# Patient Record
Sex: MALE | Race: WHITE | NOT HISPANIC OR LATINO | URBAN - METROPOLITAN AREA
[De-identification: names, ages, dates, MRNs, and addresses within clinical notes are randomized per-mention and may not be internally consistent; named-entity substitution may affect disease eponyms.]

---

## 2018-11-14 ENCOUNTER — INPATIENT (INPATIENT)
Facility: HOSPITAL | Age: 58
LOS: 8 days | Discharge: HOME | DRG: 215 | End: 2018-11-23
Attending: STUDENT IN AN ORGANIZED HEALTH CARE EDUCATION/TRAINING PROGRAM | Admitting: INTERNAL MEDICINE
Payer: COMMERCIAL

## 2018-11-14 VITALS
RESPIRATION RATE: 17 BRPM | SYSTOLIC BLOOD PRESSURE: 117 MMHG | HEART RATE: 97 BPM | OXYGEN SATURATION: 100 % | DIASTOLIC BLOOD PRESSURE: 87 MMHG

## 2018-11-14 DIAGNOSIS — R07.89 OTHER CHEST PAIN: ICD-10-CM

## 2018-11-14 LAB
ALBUMIN SERPL ELPH-MCNC: 4.8 G/DL — SIGNIFICANT CHANGE UP (ref 3.3–5)
ALP SERPL-CCNC: 65 U/L — SIGNIFICANT CHANGE UP (ref 40–120)
ALT FLD-CCNC: 64 U/L — HIGH (ref 10–45)
ANION GAP SERPL CALC-SCNC: 17 MMOL/L — SIGNIFICANT CHANGE UP (ref 5–17)
ANION GAP SERPL CALC-SCNC: 19 MMOL/L — HIGH (ref 5–17)
APTT BLD: 26.1 SEC — LOW (ref 27.5–36.3)
APTT BLD: 35.9 SEC — SIGNIFICANT CHANGE UP (ref 27.5–36.3)
AST SERPL-CCNC: 71 U/L — HIGH (ref 10–40)
BASOPHILS # BLD AUTO: 0 K/UL — SIGNIFICANT CHANGE UP (ref 0–0.2)
BASOPHILS NFR BLD AUTO: 0 % — SIGNIFICANT CHANGE UP (ref 0–2)
BILIRUB SERPL-MCNC: 0.8 MG/DL — SIGNIFICANT CHANGE UP (ref 0.2–1.2)
BUN SERPL-MCNC: 21 MG/DL — SIGNIFICANT CHANGE UP (ref 7–23)
BUN SERPL-MCNC: 22 MG/DL — SIGNIFICANT CHANGE UP (ref 7–23)
CALCIUM SERPL-MCNC: 9.4 MG/DL — SIGNIFICANT CHANGE UP (ref 8.4–10.5)
CALCIUM SERPL-MCNC: 9.6 MG/DL — SIGNIFICANT CHANGE UP (ref 8.4–10.5)
CHLORIDE SERPL-SCNC: 101 MMOL/L — SIGNIFICANT CHANGE UP (ref 96–108)
CHLORIDE SERPL-SCNC: 99 MMOL/L — SIGNIFICANT CHANGE UP (ref 96–108)
CK MB BLD-MCNC: 7.3 % — HIGH (ref 0–3.5)
CK MB BLD-MCNC: 7.9 % — HIGH (ref 0–3.5)
CK MB CFR SERPL CALC: 45.2 NG/ML — HIGH (ref 0–6.7)
CK MB CFR SERPL CALC: 49.6 NG/ML — HIGH (ref 0–6.7)
CK SERPL-CCNC: 570 U/L — HIGH (ref 30–200)
CK SERPL-CCNC: 682 U/L — HIGH (ref 30–200)
CO2 SERPL-SCNC: 15 MMOL/L — LOW (ref 22–31)
CO2 SERPL-SCNC: 21 MMOL/L — LOW (ref 22–31)
CREAT SERPL-MCNC: 1.01 MG/DL — SIGNIFICANT CHANGE UP (ref 0.5–1.3)
CREAT SERPL-MCNC: 1.04 MG/DL — SIGNIFICANT CHANGE UP (ref 0.5–1.3)
EOSINOPHIL # BLD AUTO: 0 K/UL — SIGNIFICANT CHANGE UP (ref 0–0.5)
EOSINOPHIL NFR BLD AUTO: 0.1 % — SIGNIFICANT CHANGE UP (ref 0–6)
GAS PNL BLDA: SIGNIFICANT CHANGE UP
GLUCOSE BLDC GLUCOMTR-MCNC: 195 MG/DL — HIGH (ref 70–99)
GLUCOSE BLDC GLUCOMTR-MCNC: 222 MG/DL — HIGH (ref 70–99)
GLUCOSE SERPL-MCNC: 245 MG/DL — HIGH (ref 70–99)
GLUCOSE SERPL-MCNC: 259 MG/DL — HIGH (ref 70–99)
HBA1C BLD-MCNC: 7.1 % — HIGH (ref 4–5.6)
HCT VFR BLD CALC: 48 % — SIGNIFICANT CHANGE UP (ref 39–50)
HGB BLD-MCNC: 17 G/DL — SIGNIFICANT CHANGE UP (ref 13–17)
INR BLD: 1.06 RATIO — SIGNIFICANT CHANGE UP (ref 0.88–1.16)
LYMPHOCYTES # BLD AUTO: 0.7 K/UL — LOW (ref 1–3.3)
LYMPHOCYTES # BLD AUTO: 5.1 % — LOW (ref 13–44)
MAGNESIUM SERPL-MCNC: 1.9 MG/DL — SIGNIFICANT CHANGE UP (ref 1.6–2.6)
MAGNESIUM SERPL-MCNC: 1.9 MG/DL — SIGNIFICANT CHANGE UP (ref 1.6–2.6)
MCHC RBC-ENTMCNC: 31.2 PG — SIGNIFICANT CHANGE UP (ref 27–34)
MCHC RBC-ENTMCNC: 35.5 GM/DL — SIGNIFICANT CHANGE UP (ref 32–36)
MCV RBC AUTO: 87.9 FL — SIGNIFICANT CHANGE UP (ref 80–100)
MONOCYTES # BLD AUTO: 0.8 K/UL — SIGNIFICANT CHANGE UP (ref 0–0.9)
MONOCYTES NFR BLD AUTO: 5.3 % — SIGNIFICANT CHANGE UP (ref 2–14)
NEUTROPHILS # BLD AUTO: 13 K/UL — HIGH (ref 1.8–7.4)
NEUTROPHILS NFR BLD AUTO: 89.5 % — HIGH (ref 43–77)
PHOSPHATE SERPL-MCNC: 3.7 MG/DL — SIGNIFICANT CHANGE UP (ref 2.5–4.5)
PHOSPHATE SERPL-MCNC: 3.7 MG/DL — SIGNIFICANT CHANGE UP (ref 2.5–4.5)
PLATELET # BLD AUTO: 236 K/UL — SIGNIFICANT CHANGE UP (ref 150–400)
POTASSIUM SERPL-MCNC: 4 MMOL/L — SIGNIFICANT CHANGE UP (ref 3.5–5.3)
POTASSIUM SERPL-MCNC: 5 MMOL/L — SIGNIFICANT CHANGE UP (ref 3.5–5.3)
POTASSIUM SERPL-SCNC: 4 MMOL/L — SIGNIFICANT CHANGE UP (ref 3.5–5.3)
POTASSIUM SERPL-SCNC: 5 MMOL/L — SIGNIFICANT CHANGE UP (ref 3.5–5.3)
PROT SERPL-MCNC: 7.7 G/DL — SIGNIFICANT CHANGE UP (ref 6–8.3)
PROTHROM AB SERPL-ACNC: 12.2 SEC — SIGNIFICANT CHANGE UP (ref 10–12.9)
RBC # BLD: 5.46 M/UL — SIGNIFICANT CHANGE UP (ref 4.2–5.8)
RBC # FLD: 12.2 % — SIGNIFICANT CHANGE UP (ref 10.3–14.5)
SODIUM SERPL-SCNC: 135 MMOL/L — SIGNIFICANT CHANGE UP (ref 135–145)
SODIUM SERPL-SCNC: 137 MMOL/L — SIGNIFICANT CHANGE UP (ref 135–145)
TROPONIN T, HIGH SENSITIVITY RESULT: 1165 NG/L — HIGH (ref 0–51)
TROPONIN T, HIGH SENSITIVITY RESULT: 911 NG/L — HIGH (ref 0–51)
TSH SERPL-MCNC: 1.36 UIU/ML — SIGNIFICANT CHANGE UP (ref 0.27–4.2)
WBC # BLD: 14.6 K/UL — HIGH (ref 3.8–10.5)
WBC # FLD AUTO: 14.6 K/UL — HIGH (ref 3.8–10.5)

## 2018-11-14 PROCEDURE — 99291 CRITICAL CARE FIRST HOUR: CPT

## 2018-11-14 PROCEDURE — 93306 TTE W/DOPPLER COMPLETE: CPT | Mod: 26

## 2018-11-14 PROCEDURE — 93010 ELECTROCARDIOGRAM REPORT: CPT

## 2018-11-14 RX ORDER — DEXTROSE 50 % IN WATER 50 %
12.5 SYRINGE (ML) INTRAVENOUS ONCE
Qty: 0 | Refills: 0 | Status: DISCONTINUED | OUTPATIENT
Start: 2018-11-14 | End: 2018-11-23

## 2018-11-14 RX ORDER — INFLUENZA VIRUS VACCINE 15; 15; 15; 15 UG/.5ML; UG/.5ML; UG/.5ML; UG/.5ML
0.5 SUSPENSION INTRAMUSCULAR ONCE
Qty: 0 | Refills: 0 | Status: COMPLETED | OUTPATIENT
Start: 2018-11-14 | End: 2018-11-14

## 2018-11-14 RX ORDER — FUROSEMIDE 40 MG
40 TABLET ORAL ONCE
Qty: 0 | Refills: 0 | Status: COMPLETED | OUTPATIENT
Start: 2018-11-14 | End: 2018-11-14

## 2018-11-14 RX ORDER — DEXTROSE 50 % IN WATER 50 %
25 SYRINGE (ML) INTRAVENOUS ONCE
Qty: 0 | Refills: 0 | Status: DISCONTINUED | OUTPATIENT
Start: 2018-11-14 | End: 2018-11-23

## 2018-11-14 RX ORDER — GLUCAGON INJECTION, SOLUTION 0.5 MG/.1ML
1 INJECTION, SOLUTION SUBCUTANEOUS ONCE
Qty: 0 | Refills: 0 | Status: DISCONTINUED | OUTPATIENT
Start: 2018-11-14 | End: 2018-11-23

## 2018-11-14 RX ORDER — DEXTROSE 50 % IN WATER 50 %
15 SYRINGE (ML) INTRAVENOUS ONCE
Qty: 0 | Refills: 0 | Status: DISCONTINUED | OUTPATIENT
Start: 2018-11-14 | End: 2018-11-23

## 2018-11-14 RX ORDER — CLOPIDOGREL BISULFATE 75 MG/1
75 TABLET, FILM COATED ORAL DAILY
Qty: 0 | Refills: 0 | Status: DISCONTINUED | OUTPATIENT
Start: 2018-11-15 | End: 2018-11-16

## 2018-11-14 RX ORDER — ASPIRIN/CALCIUM CARB/MAGNESIUM 324 MG
81 TABLET ORAL DAILY
Qty: 0 | Refills: 0 | Status: DISCONTINUED | OUTPATIENT
Start: 2018-11-15 | End: 2018-11-23

## 2018-11-14 RX ORDER — IPRATROPIUM/ALBUTEROL SULFATE 18-103MCG
3 AEROSOL WITH ADAPTER (GRAM) INHALATION ONCE
Qty: 0 | Refills: 0 | Status: COMPLETED | OUTPATIENT
Start: 2018-11-14 | End: 2018-11-14

## 2018-11-14 RX ORDER — NITROGLYCERIN 6.5 MG
1 CAPSULE, EXTENDED RELEASE ORAL ONCE
Qty: 0 | Refills: 0 | Status: COMPLETED | OUTPATIENT
Start: 2018-11-14 | End: 2018-11-14

## 2018-11-14 RX ORDER — INSULIN LISPRO 100/ML
VIAL (ML) SUBCUTANEOUS
Qty: 0 | Refills: 0 | Status: DISCONTINUED | OUTPATIENT
Start: 2018-11-14 | End: 2018-11-23

## 2018-11-14 RX ORDER — SODIUM CHLORIDE 9 MG/ML
1000 INJECTION, SOLUTION INTRAVENOUS
Qty: 0 | Refills: 0 | Status: DISCONTINUED | OUTPATIENT
Start: 2018-11-14 | End: 2018-11-23

## 2018-11-14 RX ORDER — NITROGLYCERIN 6.5 MG
10 CAPSULE, EXTENDED RELEASE ORAL
Qty: 50 | Refills: 0 | Status: DISCONTINUED | OUTPATIENT
Start: 2018-11-14 | End: 2018-11-15

## 2018-11-14 RX ORDER — ATORVASTATIN CALCIUM 80 MG/1
80 TABLET, FILM COATED ORAL AT BEDTIME
Qty: 0 | Refills: 0 | Status: DISCONTINUED | OUTPATIENT
Start: 2018-11-14 | End: 2018-11-23

## 2018-11-14 RX ORDER — HEPARIN SODIUM 5000 [USP'U]/ML
5600 INJECTION INTRAVENOUS; SUBCUTANEOUS EVERY 6 HOURS
Qty: 0 | Refills: 0 | Status: DISCONTINUED | OUTPATIENT
Start: 2018-11-14 | End: 2018-11-15

## 2018-11-14 RX ORDER — HEPARIN SODIUM 5000 [USP'U]/ML
INJECTION INTRAVENOUS; SUBCUTANEOUS
Qty: 25000 | Refills: 0 | Status: DISCONTINUED | OUTPATIENT
Start: 2018-11-14 | End: 2018-11-15

## 2018-11-14 RX ADMIN — ATORVASTATIN CALCIUM 80 MILLIGRAM(S): 80 TABLET, FILM COATED ORAL at 22:15

## 2018-11-14 RX ADMIN — Medication 3 MILLILITER(S): at 14:46

## 2018-11-14 RX ADMIN — Medication 40 MILLIGRAM(S): at 14:45

## 2018-11-14 RX ADMIN — Medication 3 MICROGRAM(S)/MIN: at 15:58

## 2018-11-14 RX ADMIN — HEPARIN SODIUM 1000 UNIT(S)/HR: 5000 INJECTION INTRAVENOUS; SUBCUTANEOUS at 15:57

## 2018-11-14 RX ADMIN — HEPARIN SODIUM 1300 UNIT(S)/HR: 5000 INJECTION INTRAVENOUS; SUBCUTANEOUS at 22:15

## 2018-11-14 RX ADMIN — Medication 2: at 17:32

## 2018-11-14 NOTE — PROGRESS NOTE ADULT - SUBJECTIVE AND OBJECTIVE BOX
====================  CCU MIDNIGHT ROUNDS  ====================    HOME RAMIREZ  [id number]  Patient is a 58y old  Male who presents with a chief complaint of SOB and chest tightness (14 Nov 2018 15:02)      ====================  SUMMARY:  ====================      ====================  NEW EVENTS:  ====================    MEDICATIONS  (STANDING):  atorvastatin 80 milliGRAM(s) Oral at bedtime  dextrose 5%. 1000 milliLiter(s) (50 mL/Hr) IV Continuous <Continuous>  dextrose 50% Injectable 12.5 Gram(s) IV Push once  dextrose 50% Injectable 25 Gram(s) IV Push once  dextrose 50% Injectable 25 Gram(s) IV Push once  heparin  Infusion.  Unit(s)/Hr (10 mL/Hr) IV Continuous <Continuous>  influenza   Vaccine 0.5 milliLiter(s) IntraMuscular once  insulin lispro (HumaLOG) corrective regimen sliding scale   SubCutaneous three times a day before meals  nitroglycerin  Infusion 10 MICROgram(s)/Min (3 mL/Hr) IV Continuous <Continuous>    MEDICATIONS  (PRN):  dextrose 40% Gel 15 Gram(s) Oral once PRN Blood Glucose LESS THAN 70 milliGRAM(s)/deciliter  glucagon  Injectable 1 milliGRAM(s) IntraMuscular once PRN Glucose LESS THAN 70 milligrams/deciliter  heparin  Injectable 5600 Unit(s) IV Push every 6 hours PRN For aPTT less than 40      ====================  VITALS (Last 12 hrs):  ====================    T(C): 36.7 (11-14-18 @ 19:00), Max: 36.7 (11-14-18 @ 19:00)  T(F): 98 (11-14-18 @ 19:00), Max: 98 (11-14-18 @ 19:00)  HR: 80 (11-14-18 @ 23:00) (68 - 110)  BP: 86/67 (11-14-18 @ 23:00) (86/67 - 161/97)  BP(mean): 72 (11-14-18 @ 23:00) (70 - 119)  ABP: --  ABP(mean): --  RR: 17 (11-14-18 @ 23:00) (12 - 25)  SpO2: 97% (11-14-18 @ 23:00) (92% - 100%)  Wt(kg): --  CVP(mm Hg): --  CVP(cm H2O): --  CO: --  CI: --  PA: --  PA(mean): --  PCWP: --  SVR: --  PVR: --    I&O's Summary    14 Nov 2018 07:01  -  14 Nov 2018 23:34  --------------------------------------------------------  IN: 439 mL / OUT: 2750 mL / NET: -2311 mL            ====================  NEW LABS:  ====================      11-14    137  |  99  |  21  ----------------------------<  259<H>  4.0   |  21<L>  |  1.04    Ca    9.4      14 Nov 2018 20:57  Phos  3.7     11-14  Mg     1.9     11-14    TPro  7.7  /  Alb  4.8  /  TBili  0.8  /  DBili  x   /  AST  71<H>  /  ALT  64<H>  /  AlkPhos  65  11-14    PT/INR - ( 14 Nov 2018 15:36 )   PT: 12.2 sec;   INR: 1.06 ratio         PTT - ( 14 Nov 2018 20:57 )  PTT:35.9 sec  Creatine Kinase, Serum: 682 U/L <H> (11-14-18 @ 20:57)  Creatine Kinase, Serum: 570 U/L <H> (11-14-18 @ 15:36)    CKMB Units: 49.6 ng/mL (11-14 @ 20:57)  CKMB Units: 45.2 ng/mL (11-14 @ 15:36)    ABG - ( 14 Nov 2018 15:12 )  pH, Arterial: 7.40  pH, Blood: x     /  pCO2: 29    /  pO2: 106   / HCO3: 18    / Base Excess: -4.9  /  SaO2: 98        ====================  PLAN:  ====================  -       Mignon Zuleta [facility] NP  Beeper #[id number]  Spectra # [phone] ====================  CCU MIDNIGHT ROUNDS  ====================    HOME RAMIREZ  [id number]  Patient is a 58y old  Male who presents with a chief complaint of SOB and chest tightness (14 Nov 2018 15:02)    ====================  SUMMARY: 59 y/o male with PMH of T2DM who presented to [facility] with SOB and midsternal chest tightness. In the ED, D-Dimer was 681, lactate 6.9, BNP 1924 CT chest was negative for PE. CXR showed extensive regions of parenchymal consolidation and groundglass opacities to bilateral lung fields with small/moderate pleural effusions, lasix 40 mg IVP and duoNebs x1 were given and placed on BiPAP.  Echo also showed severe global dysfunction with elevated Trop 0.6, pt was loaded with  mg and Plavix 600 mg and transferred to [facility] for NSTEMI.  In ED, continued with BiPAP and pt was given lasix 40 mg x2 IVP with output of 1L and started on nitroglycerin gtt for elevated BP.  Transferred to CCU for further management.  ====================    ====================  NEW EVENTS: None   ====================    MEDICATIONS  (STANDING):  atorvastatin 80 milliGRAM(s) Oral at bedtime  dextrose 5%. 1000 milliLiter(s) (50 mL/Hr) IV Continuous <Continuous>  dextrose 50% Injectable 12.5 Gram(s) IV Push once  dextrose 50% Injectable 25 Gram(s) IV Push once  dextrose 50% Injectable 25 Gram(s) IV Push once  heparin  Infusion.  Unit(s)/Hr (10 mL/Hr) IV Continuous <Continuous>  influenza   Vaccine 0.5 milliLiter(s) IntraMuscular once  insulin lispro (HumaLOG) corrective regimen sliding scale   SubCutaneous three times a day before meals  nitroglycerin  Infusion 10 MICROgram(s)/Min (3 mL/Hr) IV Continuous <Continuous>    MEDICATIONS  (PRN):  dextrose 40% Gel 15 Gram(s) Oral once PRN Blood Glucose LESS THAN 70 milliGRAM(s)/deciliter  glucagon  Injectable 1 milliGRAM(s) IntraMuscular once PRN Glucose LESS THAN 70 milligrams/deciliter  heparin  Injectable 5600 Unit(s) IV Push every 6 hours PRN For aPTT less than 40    ====================  VITALS (Last 12 hrs):  ====================  T(C): 36.7 (11-14-18 @ 19:00), Max: 36.7 (11-14-18 @ 19:00)  T(F): 98 (11-14-18 @ 19:00), Max: 98 (11-14-18 @ 19:00)  HR: 80 (11-14-18 @ 23:00) (68 - 110)  BP: 86/67 (11-14-18 @ 23:00) (86/67 - 161/97)  BP(mean): 72 (11-14-18 @ 23:00) (70 - 119)  RR: 17 (11-14-18 @ 23:00) (12 - 25)  SpO2: 97% (11-14-18 @ 23:00) (92% - 100%)      I&O's Summary    14 Nov 2018 07:01  -  14 Nov 2018 23:34  --------------------------------------------------------  IN: 439 mL / OUT: 2750 mL / NET: -2311 mL      ====================  NEW LABS:  ====================  11-14    137  |  99  |  21  ----------------------------<  259<H>  4.0   |  21<L>  |  1.04    Ca    9.4      14 Nov 2018 20:57  Phos  3.7     11-14  Mg     1.9     11-14    TPro  7.7  /  Alb  4.8  /  TBili  0.8  /  DBili  x   /  AST  71<H>  /  ALT  64<H>  /  AlkPhos  65  11-14    PT/INR - ( 14 Nov 2018 15:36 )   PT: 12.2 sec;   INR: 1.06 ratio      PTT - ( 14 Nov 2018 20:57 )  PTT:35.9 sec  Creatine Kinase, Serum: 682 U/L <H> (11-14-18 @ 20:57)  Creatine Kinase, Serum: 570 U/L <H> (11-14-18 @ 15:36)    CKMB Units: 49.6 ng/mL (11-14 @ 20:57)  CKMB Units: 45.2 ng/mL (11-14 @ 15:36)    ABG - ( 14 Nov 2018 15:12 )  pH, Arterial: 7.40  pH, Blood: x     /  pCO2: 29    /  pO2: 106   / HCO3: 18    / Base Excess: -4.9  /  SaO2: 98        ====================  PLAN:  ====================        Mignon Zuleta [facility] NP  Beeper #[id number]  Spectra # [phone] ====================  CCU MIDNIGHT ROUNDS  ====================    HOME RAMIREZ  [id number]  Patient is a 58y old  Male who presents with a chief complaint of SOB and chest tightness (14 Nov 2018 15:02)    ====================  SUMMARY: 59 y/o male with PMH of T2DM who presented to [facility] with SOB and midsternal chest tightness. In the ED, D-Dimer was 681, lactate 6.9, BNP 1924 CT chest was negative for PE. CXR showed extensive regions of parenchymal consolidation and groundglass opacities to bilateral lung fields with small/moderate pleural effusions, lasix 40 mg IVP and duoNebs x1 were given and placed on BiPAP.  Echo also showed severe global dysfunction with elevated Trop 0.6, pt was loaded with  mg and Plavix 600 mg and transferred to [facility] for NSTEMI.  In ED, continued with BiPAP and pt was given lasix 40 mg x2 IVP with output of 1L and started on nitroglycerin gtt for elevated BP.  Transferred to CCU for further management.  ====================    ====================  NEW EVENTS: None   ====================    MEDICATIONS  (STANDING):  atorvastatin 80 milliGRAM(s) Oral at bedtime  dextrose 5%. 1000 milliLiter(s) (50 mL/Hr) IV Continuous <Continuous>  dextrose 50% Injectable 12.5 Gram(s) IV Push once  dextrose 50% Injectable 25 Gram(s) IV Push once  dextrose 50% Injectable 25 Gram(s) IV Push once  heparin  Infusion.  Unit(s)/Hr (10 mL/Hr) IV Continuous <Continuous>  influenza   Vaccine 0.5 milliLiter(s) IntraMuscular once  insulin lispro (HumaLOG) corrective regimen sliding scale   SubCutaneous three times a day before meals  nitroglycerin  Infusion 10 MICROgram(s)/Min (3 mL/Hr) IV Continuous <Continuous>    MEDICATIONS  (PRN):  dextrose 40% Gel 15 Gram(s) Oral once PRN Blood Glucose LESS THAN 70 milliGRAM(s)/deciliter  glucagon  Injectable 1 milliGRAM(s) IntraMuscular once PRN Glucose LESS THAN 70 milligrams/deciliter  heparin  Injectable 5600 Unit(s) IV Push every 6 hours PRN For aPTT less than 40    ====================  VITALS (Last 12 hrs):  ====================  T(C): 36.7 (11-14-18 @ 19:00), Max: 36.7 (11-14-18 @ 19:00)  T(F): 98 (11-14-18 @ 19:00), Max: 98 (11-14-18 @ 19:00)  HR: 80 (11-14-18 @ 23:00) (68 - 110)  BP: 86/67 (11-14-18 @ 23:00) (86/67 - 161/97)  BP(mean): 72 (11-14-18 @ 23:00) (70 - 119)  RR: 17 (11-14-18 @ 23:00) (12 - 25)  SpO2: 97% (11-14-18 @ 23:00) (92% - 100%)      I&O's Summary    14 Nov 2018 07:01  -  14 Nov 2018 23:34  --------------------------------------------------------  IN: 439 mL / OUT: 2750 mL / NET: -2311 mL      ====================  NEW LABS:  ====================  11-14    137  |  99  |  21  ----------------------------<  259<H>  4.0   |  21<L>  |  1.04    Ca    9.4      14 Nov 2018 20:57  Phos  3.7     11-14  Mg     1.9     11-14    TPro  7.7  /  Alb  4.8  /  TBili  0.8  /  DBili  x   /  AST  71<H>  /  ALT  64<H>  /  AlkPhos  65  11-14    PT/INR - ( 14 Nov 2018 15:36 )   PT: 12.2 sec;   INR: 1.06 ratio      PTT - ( 14 Nov 2018 20:57 )  PTT:35.9 sec  Creatine Kinase, Serum: 682 U/L <H> (11-14-18 @ 20:57)  Creatine Kinase, Serum: 570 U/L <H> (11-14-18 @ 15:36)    CKMB Units: 49.6 ng/mL (11-14 @ 20:57)  CKMB Units: 45.2 ng/mL (11-14 @ 15:36)    ABG - ( 14 Nov 2018 15:12 )  pH, Arterial: 7.40  pH, Blood: x     /  pCO2: 29    /  pO2: 106   / HCO3: 18    / Base Excess: -4.9  /  SaO2: 98        ====================  PLAN:  ====================  #NSTEMI  - DAPT, high dose statin   - ACS Heparin gtt per protocol  - Nitro gtt maintain MAP > 65  - Plan [facility] tomorrow  - Trend MARILEE Zuleta [facility] NP  Beeper #[id number]  Spectra # [phone] ====================  CCU MIDNIGHT ROUNDS  ====================    HOME RAMIREZ  [id number]  Patient is a 58y old  Male who presents with a chief complaint of SOB and chest tightness (14 Nov 2018 15:02)    ====================  SUMMARY: 57 y/o male with PMH of T2DM who presented to [facility] with SOB and midsternal chest tightness. In the ED, D-Dimer was 681, lactate 6.9, BNP 1924 CT chest was negative for PE. CXR showed extensive regions of parenchymal consolidation and groundglass opacities to bilateral lung fields with small/moderate pleural effusions, lasix 40 mg IVP and duoNebs x1 were given and placed on BiPAP.  Echo also showed severe global dysfunction with elevated Trop 0.6, pt was loaded with  mg and Plavix 600 mg and transferred to [facility] for NSTEMI.  In ED, continued with BiPAP and pt was given lasix 40 mg x2 IVP with output of 1L and started on nitroglycerin gtt for elevated BP.  Transferred to CCU for further management.  ====================    ====================  NEW EVENTS: Weaned off Nitro gtt  ====================    MEDICATIONS  (STANDING):  atorvastatin 80 milliGRAM(s) Oral at bedtime  dextrose 5%. 1000 milliLiter(s) (50 mL/Hr) IV Continuous <Continuous>  dextrose 50% Injectable 12.5 Gram(s) IV Push once  dextrose 50% Injectable 25 Gram(s) IV Push once  dextrose 50% Injectable 25 Gram(s) IV Push once  heparin  Infusion.  Unit(s)/Hr (10 mL/Hr) IV Continuous <Continuous>  influenza   Vaccine 0.5 milliLiter(s) IntraMuscular once  insulin lispro (HumaLOG) corrective regimen sliding scale   SubCutaneous three times a day before meals  nitroglycerin  Infusion 10 MICROgram(s)/Min (3 mL/Hr) IV Continuous <Continuous>    MEDICATIONS  (PRN):  dextrose 40% Gel 15 Gram(s) Oral once PRN Blood Glucose LESS THAN 70 milliGRAM(s)/deciliter  glucagon  Injectable 1 milliGRAM(s) IntraMuscular once PRN Glucose LESS THAN 70 milligrams/deciliter  heparin  Injectable 5600 Unit(s) IV Push every 6 hours PRN For aPTT less than 40    ====================  VITALS (Last 12 hrs):  ====================  T(C): 36.7 (11-14-18 @ 19:00), Max: 36.7 (11-14-18 @ 19:00)  T(F): 98 (11-14-18 @ 19:00), Max: 98 (11-14-18 @ 19:00)  HR: 80 (11-14-18 @ 23:00) (68 - 110)  BP: 86/67 (11-14-18 @ 23:00) (86/67 - 161/97)  BP(mean): 72 (11-14-18 @ 23:00) (70 - 119)  RR: 17 (11-14-18 @ 23:00) (12 - 25)  SpO2: 97% (11-14-18 @ 23:00) (92% - 100%)      I&O's Summary    14 Nov 2018 07:01  -  14 Nov 2018 23:34  --------------------------------------------------------  IN: 439 mL / OUT: 2750 mL / NET: -2311 mL      ====================  NEW LABS:  ====================  11-14    137  |  99  |  21  ----------------------------<  259<H>  4.0   |  21<L>  |  1.04    Ca    9.4      14 Nov 2018 20:57  Phos  3.7     11-14  Mg     1.9     11-14    TPro  7.7  /  Alb  4.8  /  TBili  0.8  /  DBili  x   /  AST  71<H>  /  ALT  64<H>  /  AlkPhos  65  11-14    PT/INR - ( 14 Nov 2018 15:36 )   PT: 12.2 sec;   INR: 1.06 ratio      PTT - ( 14 Nov 2018 20:57 )  PTT:35.9 sec  Creatine Kinase, Serum: 682 U/L <H> (11-14-18 @ 20:57)  Creatine Kinase, Serum: 570 U/L <H> (11-14-18 @ 15:36)    CKMB Units: 49.6 ng/mL (11-14 @ 20:57)  CKMB Units: 45.2 ng/mL (11-14 @ 15:36)    ABG - ( 14 Nov 2018 15:12 )  pH, Arterial: 7.40  pH, Blood: x     /  pCO2: 29    /  pO2: 106   / HCO3: 18    / Base Excess: -4.9  /  SaO2: 98        ====================  PLAN:  ====================  #NSTEMI  - DAPT, high dose statin   - Holding BB for liable BP  - ACS Heparin gtt per protocol  - Plan [facility] tomorrow  - Trend MARILEE Zuleta [facility] NP  Beeper #[id number]  Spectra # [phone]

## 2018-11-14 NOTE — CHART NOTE - NSCHARTNOTEFT_GEN_A_CORE
Pt transferred from [facility] today to [facility] at 1332 with BIPAP on +Acute Respiratory Distress +SOB +diaphoresis noted  and mild chest discomfort .  MD Lockwood present pt to be transferred to CCU now   ECHO ordered STAT ( preliminary report Severe Global dysfunction noted ) stat Lasix 40mg IVP x 1 stat , Duonebs x 1 stat.   B/P 141/116  RR 24-26  O2 sats on BIPAP 100%   Resp +crackles +rhonchi noted throughout +D-Dimer at [place] 681 Lactate 6.9 PRo BNP 1924   Pt had CT angiogram this am in ED preliminary report no evidence of PE noted , showed extensive regions of parenchymal consolidation and groundglass opacities noted in the bilateral lung fields, There are overlying small to moderate pleural effusions .   Lasix 40 mg IV x 1 now and NTG paste 1 inch stat   Report given to CCU Awaiting Cardiac Fellow

## 2018-11-14 NOTE — H&P ADULT - NSHPPHYSICALEXAM_GEN_ALL_CORE
Constitutional: Pt looks comfortable with BiPAP mask lying in bed    HEENT:  Normocephalic atraumatic. PERRLA, EMOI. Sclera pink/warm. Neck supply with trachea midline. no lymphoadenopathy.   Respiratory: Bilateral rales noted on auscultation. Bilateral equal chest expansion, no use of accessory muscles. no wheezing/rhonchi.   Cardiovascular: RRR. S1S2. no m/g/r. no JVD  Gastrointestinal: soft/flat. BS x4 quadrant. no tenderness/distension. Last BM was today AM  Genitourinary: Tate with clear and yellow urine.  Extremities: 5/5 strength to bilateral UE/LE. no cyanosis/clubbing/swelling  Vascular: 2+ pulses bilaterally to UE/LE  Neurological: A+O x3 calm and cooperative. Cranial nerves II-XII grossly intact.   Skin: warm and dry. no rashes/ecchymosis.

## 2018-11-14 NOTE — H&P ADULT - NSHPREVIEWOFSYSTEMS_GEN_ALL_CORE
CONSTITUTIONAL: feeling better on BiPAP  EYES: No eye pain, visual disturbances, or discharge  ENMT:  No difficulty hearing, tinnitus, vertigo; No sinus or throat pain  NECK: No pain or stiffness  RESPIRATORY: No SOB on BiPAP mask. No cough, wheezing, chills or hemoptysis;   CARDIOVASCULAR: No chest pain, palpitations, dizziness, or leg swelling  GASTROINTESTINAL: No abdominal or epigastric pain. No nausea, vomiting, or hematemesis; No diarrhea or constipation. No melena or hematochezia.  GENITOURINARY: No dysuria, frequency, hematuria, or incontinence  NEUROLOGICAL: No headaches, memory loss, loss of strength, numbness, or tremors  SKIN: No itching, burning, rashes, or lesions   LYMPH NODES: No enlarged glands  ENDOCRINE: No heat or cold intolerance; No hair loss  MUSCULOSKELETAL: No joint pain or swelling; No muscle, back, or extremity pain  PSYCHIATRIC: No depression, anxiety, mood swings, or difficulty sleeping  HEME/LYMPH: No easy bruising, or bleeding gums  ALLERGY AND IMMUNOLOGIC: No hives or eczema

## 2018-11-14 NOTE — H&P ADULT - ATTENDING COMMENTS
Patient presents with NSTEMI and hypertensive emergency. Arrived in CCU on nitro gtt with resolution of symptoms.  Now hemodynamically stable.  Trend lactate to normal.  Trend CE.    Continue heparin gtt and dual antiplatelet therapy for ACS.  Plan for left heart cath tomorrow.

## 2018-11-14 NOTE — H&P ADULT - ASSESSMENT
59 y/o male with PMH of T2DM who presented to [facility] with SOB and midsternal chest tightness. In the ED, D-Dimer was 681, lactate 6.9, BNP 1924 CT chest was negative for PE. CXR showed extensive regions of parenchymal consolidation and groundglass opacities to bilateral lung fields with small/moderate pleural effusions, lasix 40 mg IVP and duoNebs x1 were given and placed on BiPAP.  Echo also showed severe global dysfunction with elevated Trop 0.6, pt was loaded with  mg and Plavix 600 mg and transferred to [facility] for NSTEMI.      In ED, pt was given another dose of lasix 40 mg IVP with output of 1L and started on nitroglycerin gtt for elevated BP.  Transferred to CCU for further management.    #Neuro  no active issues    #Cardiac  NSTEMI  -Start ASA 81mg, Plavix 75mg, lipitor 80mg  -Nitroglycerin gtt maintain BP   -heparin gtt maintain to therapeutic    #Pulmonary  SOB   -CT negative for PE  -Wean BiPAP as tolerated  -Monitor SpO2    #GI  no active issues    #Renal  no active issues  -Trend lytes and supplement as needed    #Endocrine  hyperglycemia hx T2DM  - ISS  -hold metformin   -send HgA1C  -Start DASH diet    #ID  Leukocytosis  -Trend WBC, lactate 59 y/o male with PMH of T2DM who presented to [facility] with SOB and midsternal chest tightness. In the ED, D-Dimer was 681, lactate 6.9, BNP 1924 CT chest was negative for PE. CXR showed extensive regions of parenchymal consolidation and groundglass opacities to bilateral lung fields with small/moderate pleural effusions, lasix 40 mg IVP and duoNebs x1 were given and placed on BiPAP.  Echo also showed severe global dysfunction with elevated Trop 0.6, pt was loaded with  mg and Plavix 600 mg and transferred to [facility] for NSTEMI.      In ED, pt was given another dose of lasix 40 mg IVP with output of 1L and started on nitroglycerin gtt for elevated BP.  Transferred to CCU for further management.    #Neuro  no active issues    #Cardiac  NSTEMI  -Start ASA 81mg, Plavix 75mg, lipitor 80mg  -Nitroglycerin gtt maintain BP MAP 65  -heparin gtt monitor PTT to therapeutic  -possible PCI tomorrow  -Admission labs, trend CE  -12 lead EKG daily, PRN with pain    #Pulmonary  SOB   -CT negative for PE  -Wean BiPAP as tolerated  -Monitor SpO2    #GI  no active issues    #Renal  no active issues  -Trend lytes and supplement as needed    #Endocrine  hyperglycemia hx T2DM  - ISS  -hold metformin   -send HgA1C  -Start DASH diet    #ID  Leukocytosis  -Trend WBC, lactate    #PPX  DVT  -heparin gtt for ACS

## 2018-11-14 NOTE — H&P ADULT - HISTORY OF PRESENT ILLNESS
59 y/o male with PMH of T2DM who presented to [facility] with SOB and midsternal chest tightness. In the ED, D-Dimer was 681, lactate 6.9, BNP 1924 CT chest was negative for PE. CXR showed extensive regions of parenchymal consolidation and groundglass opacities to bilateral lung fields with small/moderate pleural effusions, lasix 40 mg IVP and duoNebs x1 were given and placed on BiPAP.  Echo also showed severe global dysfunction with elevated Trop 0.6, pt was loaded with  mg and Plavix 600 mg and transferred to [facility] for NSTEMI.      In ED, pt was given another dose of lasix 40 mg IVP with output of 1L and started on nitroglycerin gtt for elevated BP.  Transferred to CCU for further management. 57 y/o male with PMH of T2DM who presented to [facility] with SOB and midsternal chest tightness. In the ED, D-Dimer was 681, lactate 6.9, BNP 1924 CT chest was negative for PE. CXR showed extensive regions of parenchymal consolidation and groundglass opacities to bilateral lung fields with small/moderate pleural effusions, lasix 40 mg IVP and duoNebs x1 were given and placed on BiPAP.  Echo also showed severe global dysfunction with elevated Trop 0.6, pt was loaded with  mg and Plavix 600 mg and transferred to [facility] for NSTEMI.      In ED, continued with BiPAP and pt was given lasix 40 mg x2 IVP with output of 1L and started on nitroglycerin gtt for elevated BP.  Transferred to CCU for further management.

## 2018-11-14 NOTE — H&P ADULT - FAMILY HISTORY
Father  Still living? No  Family history of CABG, Age at diagnosis: Age Unknown  Family history of diabetes mellitus in father, Age at diagnosis: Age Unknown  Family history of hypertension, Age at diagnosis: Age Unknown     Mother  Still living? No  Family history of Parkinson disease, Age at diagnosis: Age Unknown  Family history of thyroid disease, Age at diagnosis: Age Unknown

## 2018-11-14 NOTE — H&P ADULT - NSHPLABSRESULTS_GEN_ALL_CORE
Vital Signs Last 24 Hrs  T(C): 35.9 (14 Nov 2018 15:15), Max: 35.9 (14 Nov 2018 13:47)  T(F): 96.7 (14 Nov 2018 15:15), Max: 96.7 (14 Nov 2018 15:15)  HR: 98 (14 Nov 2018 16:00) (68 - 110)  BP: 118/86 (14 Nov 2018 16:00) (107/80 - 161/97)  BP(mean): 101 (14 Nov 2018 16:00) (88 - 119)  RR: 18 (14 Nov 2018 16:00) (15 - 22)  SpO2: 94% (14 Nov 2018 16:00) (93% - 100%)    LABS:                        17.0   14.6  )-----------( 236      ( 14 Nov 2018 15:36 )             48.0     11-14    135  |  101  |  22  ----------------------------<  245<H>  5.0   |  15<L>  |  1.01    Ca    9.6      14 Nov 2018 15:36    TPro  7.7  /  Alb  4.8  /  TBili  0.8  /  DBili  x   /  AST  71<H>  /  ALT  64<H>  /  AlkPhos  65  11-14    PT/INR - ( 14 Nov 2018 15:36 )   PT: 12.2 sec;   INR: 1.06 ratio         PTT - ( 14 Nov 2018 15:36 )  PTT:26.1 sec    CAPILLARY BLOOD GLUCOSE      POCT Blood Glucose.: 195 mg/dL (14 Nov 2018 13:31)

## 2018-11-15 LAB
ALBUMIN SERPL ELPH-MCNC: 3.9 G/DL — SIGNIFICANT CHANGE UP (ref 3.3–5)
ALP SERPL-CCNC: 46 U/L — SIGNIFICANT CHANGE UP (ref 40–120)
ALT FLD-CCNC: 51 U/L — HIGH (ref 10–45)
ANION GAP SERPL CALC-SCNC: 13 MMOL/L — SIGNIFICANT CHANGE UP (ref 5–17)
ANION GAP SERPL CALC-SCNC: 17 MMOL/L — SIGNIFICANT CHANGE UP (ref 5–17)
APTT BLD: 49.8 SEC — HIGH (ref 27.5–36.3)
APTT BLD: 50.1 SEC — HIGH (ref 27.5–36.3)
AST SERPL-CCNC: 81 U/L — HIGH (ref 10–40)
BASOPHILS # BLD AUTO: 0 K/UL — SIGNIFICANT CHANGE UP (ref 0–0.2)
BASOPHILS # BLD AUTO: 0 K/UL — SIGNIFICANT CHANGE UP (ref 0–0.2)
BASOPHILS NFR BLD AUTO: 0.2 % — SIGNIFICANT CHANGE UP (ref 0–2)
BASOPHILS NFR BLD AUTO: 0.3 % — SIGNIFICANT CHANGE UP (ref 0–2)
BILIRUB SERPL-MCNC: 1 MG/DL — SIGNIFICANT CHANGE UP (ref 0.2–1.2)
BLD GP AB SCN SERPL QL: NEGATIVE — SIGNIFICANT CHANGE UP
BUN SERPL-MCNC: 20 MG/DL — SIGNIFICANT CHANGE UP (ref 7–23)
BUN SERPL-MCNC: 22 MG/DL — SIGNIFICANT CHANGE UP (ref 7–23)
CALCIUM SERPL-MCNC: 9 MG/DL — SIGNIFICANT CHANGE UP (ref 8.4–10.5)
CALCIUM SERPL-MCNC: 9.3 MG/DL — SIGNIFICANT CHANGE UP (ref 8.4–10.5)
CHLORIDE SERPL-SCNC: 101 MMOL/L — SIGNIFICANT CHANGE UP (ref 96–108)
CHLORIDE SERPL-SCNC: 98 MMOL/L — SIGNIFICANT CHANGE UP (ref 96–108)
CK MB CFR SERPL CALC: 18.9 NG/ML — HIGH (ref 0–6.7)
CK MB CFR SERPL CALC: 34.2 NG/ML — HIGH (ref 0–6.7)
CK MB CFR SERPL CALC: 4.1 NG/ML — SIGNIFICANT CHANGE UP (ref 0–6.7)
CK SERPL-CCNC: 436 U/L — HIGH (ref 30–200)
CK SERPL-CCNC: 614 U/L — HIGH (ref 30–200)
CK SERPL-CCNC: 70 U/L — SIGNIFICANT CHANGE UP (ref 30–200)
CO2 SERPL-SCNC: 21 MMOL/L — LOW (ref 22–31)
CO2 SERPL-SCNC: 25 MMOL/L — SIGNIFICANT CHANGE UP (ref 22–31)
CREAT SERPL-MCNC: 0.84 MG/DL — SIGNIFICANT CHANGE UP (ref 0.5–1.3)
CREAT SERPL-MCNC: 0.96 MG/DL — SIGNIFICANT CHANGE UP (ref 0.5–1.3)
EOSINOPHIL # BLD AUTO: 0 K/UL — SIGNIFICANT CHANGE UP (ref 0–0.5)
EOSINOPHIL # BLD AUTO: 0.1 K/UL — SIGNIFICANT CHANGE UP (ref 0–0.5)
EOSINOPHIL NFR BLD AUTO: 0.3 % — SIGNIFICANT CHANGE UP (ref 0–6)
EOSINOPHIL NFR BLD AUTO: 0.4 % — SIGNIFICANT CHANGE UP (ref 0–6)
GLUCOSE BLDC GLUCOMTR-MCNC: 158 MG/DL — HIGH (ref 70–99)
GLUCOSE BLDC GLUCOMTR-MCNC: 167 MG/DL — HIGH (ref 70–99)
GLUCOSE BLDC GLUCOMTR-MCNC: 172 MG/DL — HIGH (ref 70–99)
GLUCOSE BLDC GLUCOMTR-MCNC: 183 MG/DL — HIGH (ref 70–99)
GLUCOSE SERPL-MCNC: 128 MG/DL — HIGH (ref 70–99)
GLUCOSE SERPL-MCNC: 171 MG/DL — HIGH (ref 70–99)
HCT VFR BLD CALC: 35.6 % — LOW (ref 39–50)
HCT VFR BLD CALC: 44.3 % — SIGNIFICANT CHANGE UP (ref 39–50)
HGB BLD-MCNC: 12.2 G/DL — LOW (ref 13–17)
HGB BLD-MCNC: 14.9 G/DL — SIGNIFICANT CHANGE UP (ref 13–17)
INR BLD: 1.14 RATIO — SIGNIFICANT CHANGE UP (ref 0.88–1.16)
LACTATE SERPL-SCNC: 1.7 MMOL/L — SIGNIFICANT CHANGE UP (ref 0.7–2)
LYMPHOCYTES # BLD AUTO: 1.2 K/UL — SIGNIFICANT CHANGE UP (ref 1–3.3)
LYMPHOCYTES # BLD AUTO: 1.7 K/UL — SIGNIFICANT CHANGE UP (ref 1–3.3)
LYMPHOCYTES # BLD AUTO: 15 % — SIGNIFICANT CHANGE UP (ref 13–44)
LYMPHOCYTES # BLD AUTO: 8.9 % — LOW (ref 13–44)
MAGNESIUM SERPL-MCNC: 2 MG/DL — SIGNIFICANT CHANGE UP (ref 1.6–2.6)
MCHC RBC-ENTMCNC: 29.6 PG — SIGNIFICANT CHANGE UP (ref 27–34)
MCHC RBC-ENTMCNC: 31.4 PG — SIGNIFICANT CHANGE UP (ref 27–34)
MCHC RBC-ENTMCNC: 33.5 GM/DL — SIGNIFICANT CHANGE UP (ref 32–36)
MCHC RBC-ENTMCNC: 34.2 GM/DL — SIGNIFICANT CHANGE UP (ref 32–36)
MCV RBC AUTO: 88.2 FL — SIGNIFICANT CHANGE UP (ref 80–100)
MCV RBC AUTO: 91.9 FL — SIGNIFICANT CHANGE UP (ref 80–100)
MONOCYTES # BLD AUTO: 1.1 K/UL — HIGH (ref 0–0.9)
MONOCYTES # BLD AUTO: 1.5 K/UL — HIGH (ref 0–0.9)
MONOCYTES NFR BLD AUTO: 11.1 % — SIGNIFICANT CHANGE UP (ref 2–14)
MONOCYTES NFR BLD AUTO: 9.6 % — SIGNIFICANT CHANGE UP (ref 2–14)
NEUTROPHILS # BLD AUTO: 10.4 K/UL — HIGH (ref 1.8–7.4)
NEUTROPHILS # BLD AUTO: 8.4 K/UL — HIGH (ref 1.8–7.4)
NEUTROPHILS NFR BLD AUTO: 74.9 % — SIGNIFICANT CHANGE UP (ref 43–77)
NEUTROPHILS NFR BLD AUTO: 79.2 % — HIGH (ref 43–77)
PHOSPHATE SERPL-MCNC: 4.2 MG/DL — SIGNIFICANT CHANGE UP (ref 2.5–4.5)
PLATELET # BLD AUTO: 165 K/UL — SIGNIFICANT CHANGE UP (ref 150–400)
PLATELET # BLD AUTO: 196 K/UL — SIGNIFICANT CHANGE UP (ref 150–400)
POTASSIUM SERPL-MCNC: 4 MMOL/L — SIGNIFICANT CHANGE UP (ref 3.5–5.3)
POTASSIUM SERPL-MCNC: 4 MMOL/L — SIGNIFICANT CHANGE UP (ref 3.5–5.3)
POTASSIUM SERPL-SCNC: 4 MMOL/L — SIGNIFICANT CHANGE UP (ref 3.5–5.3)
POTASSIUM SERPL-SCNC: 4 MMOL/L — SIGNIFICANT CHANGE UP (ref 3.5–5.3)
PROT SERPL-MCNC: 6.4 G/DL — SIGNIFICANT CHANGE UP (ref 6–8.3)
PROTHROM AB SERPL-ACNC: 13.2 SEC — HIGH (ref 10–12.9)
RBC # BLD: 3.88 M/UL — LOW (ref 4.2–5.8)
RBC # BLD: 5.02 M/UL — SIGNIFICANT CHANGE UP (ref 4.2–5.8)
RBC # FLD: 13.1 % — SIGNIFICANT CHANGE UP (ref 10.3–14.5)
RBC # FLD: 13.9 % — SIGNIFICANT CHANGE UP (ref 10.3–14.5)
RH IG SCN BLD-IMP: NEGATIVE — SIGNIFICANT CHANGE UP
SODIUM SERPL-SCNC: 136 MMOL/L — SIGNIFICANT CHANGE UP (ref 135–145)
SODIUM SERPL-SCNC: 139 MMOL/L — SIGNIFICANT CHANGE UP (ref 135–145)
TROPONIN T, HIGH SENSITIVITY RESULT: 1371 NG/L — HIGH (ref 0–51)
TROPONIN T, HIGH SENSITIVITY RESULT: 1458 NG/L — HIGH (ref 0–51)
TROPONIN T, HIGH SENSITIVITY RESULT: 173 NG/L — HIGH (ref 0–51)
WBC # BLD: 11.3 K/UL — HIGH (ref 3.8–10.5)
WBC # BLD: 13.1 K/UL — HIGH (ref 3.8–10.5)
WBC # FLD AUTO: 11.3 K/UL — HIGH (ref 3.8–10.5)
WBC # FLD AUTO: 13.1 K/UL — HIGH (ref 3.8–10.5)

## 2018-11-15 PROCEDURE — 99291 CRITICAL CARE FIRST HOUR: CPT

## 2018-11-15 PROCEDURE — 93460 R&L HRT ART/VENTRICLE ANGIO: CPT | Mod: 26,GC

## 2018-11-15 PROCEDURE — 93010 ELECTROCARDIOGRAM REPORT: CPT

## 2018-11-15 RX ORDER — HEPARIN SODIUM 5000 [USP'U]/ML
INJECTION INTRAVENOUS; SUBCUTANEOUS
Qty: 25000 | Refills: 0 | Status: DISCONTINUED | OUTPATIENT
Start: 2018-11-16 | End: 2018-11-16

## 2018-11-15 RX ORDER — INSULIN LISPRO 100/ML
VIAL (ML) SUBCUTANEOUS AT BEDTIME
Qty: 0 | Refills: 0 | Status: DISCONTINUED | OUTPATIENT
Start: 2018-11-15 | End: 2018-11-23

## 2018-11-15 RX ORDER — METOPROLOL TARTRATE 50 MG
12.5 TABLET ORAL ONCE
Qty: 0 | Refills: 0 | Status: COMPLETED | OUTPATIENT
Start: 2018-11-15 | End: 2018-11-15

## 2018-11-15 RX ORDER — HEPARIN SODIUM 5000 [USP'U]/ML
5600 INJECTION INTRAVENOUS; SUBCUTANEOUS EVERY 6 HOURS
Qty: 0 | Refills: 0 | Status: DISCONTINUED | OUTPATIENT
Start: 2018-11-15 | End: 2018-11-16

## 2018-11-15 RX ORDER — METOPROLOL TARTRATE 50 MG
12.5 TABLET ORAL EVERY 12 HOURS
Qty: 0 | Refills: 0 | Status: DISCONTINUED | OUTPATIENT
Start: 2018-11-15 | End: 2018-11-16

## 2018-11-15 RX ADMIN — CLOPIDOGREL BISULFATE 75 MILLIGRAM(S): 75 TABLET, FILM COATED ORAL at 12:20

## 2018-11-15 RX ADMIN — Medication 1: at 12:20

## 2018-11-15 RX ADMIN — Medication 1: at 17:44

## 2018-11-15 RX ADMIN — Medication 81 MILLIGRAM(S): at 12:20

## 2018-11-15 RX ADMIN — Medication 12.5 MILLIGRAM(S): at 17:44

## 2018-11-15 RX ADMIN — Medication 1: at 08:07

## 2018-11-15 RX ADMIN — Medication 12.5 MILLIGRAM(S): at 12:20

## 2018-11-15 RX ADMIN — ATORVASTATIN CALCIUM 80 MILLIGRAM(S): 80 TABLET, FILM COATED ORAL at 22:02

## 2018-11-15 RX ADMIN — HEPARIN SODIUM 1500 UNIT(S)/HR: 5000 INJECTION INTRAVENOUS; SUBCUTANEOUS at 05:26

## 2018-11-15 RX ADMIN — HEPARIN SODIUM 1700 UNIT(S)/HR: 5000 INJECTION INTRAVENOUS; SUBCUTANEOUS at 13:57

## 2018-11-15 NOTE — PROGRESS NOTE ADULT - ASSESSMENT
57 y/o male with PMH of T2DM who presented to [facility] with SOB and midsternal chest tightness. In the ED, D-Dimer was 681, lactate 6.9, BNP 1924 CT chest was negative for PE. CXR showed extensive regions of parenchymal consolidation and groundglass opacities to bilateral lung fields with small/moderate pleural effusions, lasix 40 mg IVP and duoNebs x1 were given and placed on BiPAP.  Echo also showed severe global dysfunction with elevated Trop 0.6, pt was loaded with  mg and Plavix 600 mg and transferred to [facility] for NSTEMI.  In ED, continued with BiPAP and pt was given lasix 40 mg x2 IVP with output of 1L and started on nitroglycerin gtt for elevated BP. Currently weaned off nitroglycerin gtt and tolerating 2LNC.     #Neuro  no active issues    #Cardiac  NSTEMI  -Continue ASA 81mg, plavix 75 mg, lipitor 80mg  -Continue Heparin gtt monitor PTT to therapeutic PTT 49.8  -Add BB when BP tolerates  -Hold ACE/ARB for PCI  -Trend CE trop T 1458 >1165  -Planned PCI today    #Pulmonary  SOB   -supplemental O2 as needed  -Monitor SpO2    #GI  no active issues    #Renal  no active issues    #Endocrine  Hyperglycemia secondary to hx DM  -ISS  -DASH/carb consistent diet    #ID  Leukocytosis  -Trend WBC 14.6>11.3  -Trend lactate   -Monitor temp    #PPX  -Heparin gtt for ACS

## 2018-11-15 NOTE — CHART NOTE - NSCHARTNOTEFT_GEN_A_CORE
====================  CCU MIDNIGHT ROUNDS  ====================    HOME RAMIREZ  [id number]    ====================  SUMMARY: HPI:  57 y/o male with PMH of T2DM who presented to [facility] with SOB and midsternal chest tightness. In the ED, D-Dimer was 681, lactate 6.9, BNP 1924 CT chest was negative for PE. CXR showed extensive regions of parenchymal consolidation and groundglass opacities to bilateral lung fields with small/moderate pleural effusions, lasix 40 mg IVP and duoNebs x1 were given and placed on BiPAP.  Echo also showed severe global dysfunction with elevated Trop 0.6, pt was loaded with  mg and Plavix 600 mg and transferred to [facility] for NSTEMI.      In ED, continued with BiPAP and pt was given lasix 40 mg x2 IVP with output of 1L and started on nitroglycerin gtt for elevated BP.  Transferred to CCU for further management. (14 Nov 2018 15:02)    ====================        ====================  NEW EVENTS:  ====================        ====================  VITALS (Last 12 hrs):  ====================    T(C): 36.4 (11-15-18 @ 19:20), Max: 36.7 (11-15-18 @ 15:05)  HR: 84 (11-15-18 @ 22:00) (76 - 102)  BP: 113/77 (11-15-18 @ 22:00) (87/69 - 117/70)  BP(mean): 89 (11-15-18 @ 22:00) (72 - 91)  RR: 23 (11-15-18 @ 22:00) (16 - 31)  SpO2: 97% (11-15-18 @ 22:00) (94% - 97%)      TELEMETRY: NSR      I&O's Summary    14 Nov 2018 07:01  -  15 Nov 2018 07:00  --------------------------------------------------------  IN: 1145 mL / OUT: 3450 mL / NET: -2305 mL    15 Nov 2018 07:01  -  15 Nov 2018 23:41  --------------------------------------------------------  IN: 690 mL / OUT: 925 mL / NET: -235 mL        ====================  PLAN:  ====================    -s/p LHC pLAD 90%, LCx 100%, %.  -NPO after midnight for VERN to assess MR to determine management with CABG vs stents.  -C/w heparin gtt for ACS  -C/w asa, plavix, statin, BB  -Hold ACEI as pt may need cath    Den Guerrero, CCU PA-C  #[id number]

## 2018-11-15 NOTE — CHART NOTE - NSCHARTNOTEFT_GEN_A_CORE
Removal of Femoral Sheath    Pulses in the right lower extremity are audible by doppler. The patient was placed in the supine position. The insertion site was identified and the sutures were removed per protocol.  The __7__ Jordanian arterial and venous femoral sheath was then removed. Direct pressure was applied for  25 minutes.     Monitoring of the right groin and both lower extremities including neuro-vascular checks and vital signs every 15 minutes x 4, then every 30 minutes x 2, then every 1 hour was ordered.    Complications: None    Comments: Arterial sheath removed initially with pressure applied then venous sheath was removed. Right distal pulses present with no cyanosis noted. Pressure dressing placed, no hematoma noted.

## 2018-11-15 NOTE — PROGRESS NOTE ADULT - SUBJECTIVE AND OBJECTIVE BOX
Admission date:   CHIEF COMPLAINT: SOB and chest tightness  HPI:  59 y/o male with PMH of T2DM who presented to [facility] with SOB and midsternal chest tightness. In the ED, D-Dimer was 681, lactate 6.9, BNP 1924 CT chest was negative for PE. CXR showed extensive regions of parenchymal consolidation and groundglass opacities to bilateral lung fields with small/moderate pleural effusions, lasix 40 mg IVP and duoNebs x1 were given and placed on BiPAP.  Echo also showed severe global dysfunction with elevated Trop 0.6, pt was loaded with  mg and Plavix 600 mg and transferred to [facility] for NSTEMI.      In ED, continued with BiPAP and pt was given lasix 40 mg x2 IVP with output of 1L and started on nitroglycerin gtt for elevated BP.  Transferred to CCU for further management. (2018 15:02)    INTERVAL HISTORY: no acute issues overnight.    REVIEW OF SYSTEMS: Denies SOB, chest pain, palpitations, and dizziness; all others negative    MEDICATIONS  (STANDING):  aspirin enteric coated 81 milliGRAM(s) Oral daily  atorvastatin 80 milliGRAM(s) Oral at bedtime  clopidogrel Tablet 75 milliGRAM(s) Oral daily  dextrose 5%. 1000 milliLiter(s) (50 mL/Hr) IV Continuous <Continuous>  dextrose 50% Injectable 12.5 Gram(s) IV Push once  dextrose 50% Injectable 25 Gram(s) IV Push once  dextrose 50% Injectable 25 Gram(s) IV Push once  heparin  Infusion.  Unit(s)/Hr (10 mL/Hr) IV Continuous <Continuous>  influenza   Vaccine 0.5 milliLiter(s) IntraMuscular once  insulin lispro (HumaLOG) corrective regimen sliding scale   SubCutaneous three times a day before meals  nitroglycerin  Infusion 10 MICROgram(s)/Min (3 mL/Hr) IV Continuous <Continuous>    MEDICATIONS  (PRN):  dextrose 40% Gel 15 Gram(s) Oral once PRN Blood Glucose LESS THAN 70 milliGRAM(s)/deciliter  glucagon  Injectable 1 milliGRAM(s) IntraMuscular once PRN Glucose LESS THAN 70 milligrams/deciliter  heparin  Injectable 5600 Unit(s) IV Push every 6 hours PRN For aPTT less than 40      Objective:  ICU Vital Signs Last 24 Hrs  T(C): 36.6 (15 Nov 2018 03:00), Max: 36.7 (2018 19:00)  T(F): 97.8 (15 Nov 2018 03:00), Max: 98 (2018 19:00)  HR: 86 (15 Nov 2018 06:00) (68 - 110)  BP: 108/74 (15 Nov 2018 06:00) (86/67 - 161/97)  BP(mean): 84 (15 Nov 2018 06:00) (68 - 119)  ABP: --  ABP(mean): --  RR: 22 (15 Nov 2018 06:00) (12 - 25)  SpO2: 95% (15 Nov 2018 06:00) (92% - 100%)     @ 07:01  -  11-15 @ 07:00  --------------------------------------------------------  IN: 1145 mL / OUT: 3450 mL / NET: -2305 mL      Daily Height in cm: 180.34 (2018 14:34)    Daily Weight in k.4 (15 Nov 2018 02:00)    PHYSICAL EXAM:    Constitutional: NAD lying calmly in bed  HEENT: Normocephalic atraumatic. PERRLA, EOMI. Sclera pink with trachea midline. Ear with no discharge. no lymphoadenopathy.   Respiratory: Clear bilaterally on auscultation.  Equal bilateral chest expansion with no use of accessory muscles. no wheezing/rales/rhonchi  Cardiovascular: RRR. S1S2. no m/g/r. no JVD  Gastrointestinal: soft and flat. BSx4 quadrant. no tenderness/distension  Genitourinary: Tate draining yellow/clear urine  Extremities: 5/5 strength equal bilateral UE/LE. no cyanosis/clubbing/swelling  Vascular: 2+ pulse equal bilateral UE/LE.  Neurological: A+Ox3. calm and cooperative. Cranial nerve II-XII grossly intact  Skin: Pink and warm. no rashes/ecchymosis    TELEMETRY: regular normal sinus rhythm  EKG:     IMAGING:    Labs:  ABG - ( 2018 15:12 )  pH, Arterial: 7.40  pH, Blood: x     /  pCO2: 29    /  pO2: 106   / HCO3: 18    / Base Excess: -4.9  /  SaO2: 98                              14.9   11.3  )-----------( 196      ( 15 Nov 2018 04:37 )             44.3     11-15    139  |  101  |  22  ----------------------------<  171<H>  4.0   |  21<L>  |  0.96    Ca    9.3      15 Nov 2018 04:37  Phos  4.2     11-15  Mg     2.0     11-15    TPro  6.4  /  Alb  3.9  /  TBili  1.0  /  DBili  x   /  AST  81<H>  /  ALT  51<H>  /  AlkPhos  46  11-15    LIVER FUNCTIONS - ( 15 Nov 2018 04:37 )  Alb: 3.9 g/dL / Pro: 6.4 g/dL / ALK PHOS: 46 U/L / ALT: 51 U/L / AST: 81 U/L / GGT: x           PT/INR - ( 15 Nov 2018 04:37 )   PT: 13.2 sec;   INR: 1.14 ratio         PTT - ( 15 Nov 2018 04:37 )  PTT:49.8 sec  Creatine Kinase, Serum: 614 U/L (11-15 @ 04:37)  CKMB Units: 34.2 ng/mL (11-15 @ 04:37)  CKMB Units: 4.1 ng/mL (11-15 @ 04:21)  Creatine Kinase, Serum: 70 U/L (11-15 @ 04:21)  Creatine Kinase, Serum: 682 U/L ( @ 20:57)  CKMB Units: 49.6 ng/mL ( @ 20:57)  CKMB Units: 45.2 ng/mL ( @ 15:36)  Creatine Kinase, Serum: 570 U/L ( @ 15:36)        HEALTH ISSUES - PROBLEM Dx:

## 2018-11-16 DIAGNOSIS — I21.4 NON-ST ELEVATION (NSTEMI) MYOCARDIAL INFARCTION: ICD-10-CM

## 2018-11-16 DIAGNOSIS — I34.0 NONRHEUMATIC MITRAL (VALVE) INSUFFICIENCY: ICD-10-CM

## 2018-11-16 DIAGNOSIS — E11.9 TYPE 2 DIABETES MELLITUS WITHOUT COMPLICATIONS: ICD-10-CM

## 2018-11-16 DIAGNOSIS — I25.110 ATHEROSCLEROTIC HEART DISEASE OF NATIVE CORONARY ARTERY WITH UNSTABLE ANGINA PECTORIS: ICD-10-CM

## 2018-11-16 DIAGNOSIS — I50.21 ACUTE SYSTOLIC (CONGESTIVE) HEART FAILURE: ICD-10-CM

## 2018-11-16 LAB
ALBUMIN SERPL ELPH-MCNC: 3.8 G/DL — SIGNIFICANT CHANGE UP (ref 3.3–5)
ALP SERPL-CCNC: 48 U/L — SIGNIFICANT CHANGE UP (ref 40–120)
ALT FLD-CCNC: 40 U/L — SIGNIFICANT CHANGE UP (ref 10–45)
ANION GAP SERPL CALC-SCNC: 15 MMOL/L — SIGNIFICANT CHANGE UP (ref 5–17)
APTT BLD: 33.3 SEC — SIGNIFICANT CHANGE UP (ref 27.5–36.3)
APTT BLD: 48.1 SEC — HIGH (ref 27.5–36.3)
APTT BLD: 51.4 SEC — HIGH (ref 27.5–36.3)
AST SERPL-CCNC: 36 U/L — SIGNIFICANT CHANGE UP (ref 10–40)
BILIRUB SERPL-MCNC: 1.1 MG/DL — SIGNIFICANT CHANGE UP (ref 0.2–1.2)
BUN SERPL-MCNC: 20 MG/DL — SIGNIFICANT CHANGE UP (ref 7–23)
CALCIUM SERPL-MCNC: 9 MG/DL — SIGNIFICANT CHANGE UP (ref 8.4–10.5)
CHLORIDE SERPL-SCNC: 103 MMOL/L — SIGNIFICANT CHANGE UP (ref 96–108)
CO2 SERPL-SCNC: 21 MMOL/L — LOW (ref 22–31)
CREAT SERPL-MCNC: 0.83 MG/DL — SIGNIFICANT CHANGE UP (ref 0.5–1.3)
GLUCOSE BLDC GLUCOMTR-MCNC: 124 MG/DL — HIGH (ref 70–99)
GLUCOSE BLDC GLUCOMTR-MCNC: 164 MG/DL — HIGH (ref 70–99)
GLUCOSE BLDC GLUCOMTR-MCNC: 192 MG/DL — HIGH (ref 70–99)
GLUCOSE SERPL-MCNC: 153 MG/DL — HIGH (ref 70–99)
HCT VFR BLD CALC: 42.9 % — SIGNIFICANT CHANGE UP (ref 39–50)
HGB BLD-MCNC: 14.6 G/DL — SIGNIFICANT CHANGE UP (ref 13–17)
MAGNESIUM SERPL-MCNC: 2 MG/DL — SIGNIFICANT CHANGE UP (ref 1.6–2.6)
MCHC RBC-ENTMCNC: 30.2 PG — SIGNIFICANT CHANGE UP (ref 27–34)
MCHC RBC-ENTMCNC: 34 GM/DL — SIGNIFICANT CHANGE UP (ref 32–36)
MCV RBC AUTO: 88.7 FL — SIGNIFICANT CHANGE UP (ref 80–100)
PHOSPHATE SERPL-MCNC: 2.8 MG/DL — SIGNIFICANT CHANGE UP (ref 2.5–4.5)
PLATELET # BLD AUTO: 167 K/UL — SIGNIFICANT CHANGE UP (ref 150–400)
POTASSIUM SERPL-MCNC: 3.9 MMOL/L — SIGNIFICANT CHANGE UP (ref 3.5–5.3)
POTASSIUM SERPL-SCNC: 3.9 MMOL/L — SIGNIFICANT CHANGE UP (ref 3.5–5.3)
PROT SERPL-MCNC: 6.4 G/DL — SIGNIFICANT CHANGE UP (ref 6–8.3)
RBC # BLD: 4.84 M/UL — SIGNIFICANT CHANGE UP (ref 4.2–5.8)
RBC # FLD: 12.9 % — SIGNIFICANT CHANGE UP (ref 10.3–14.5)
SODIUM SERPL-SCNC: 139 MMOL/L — SIGNIFICANT CHANGE UP (ref 135–145)
WBC # BLD: 7.6 K/UL — SIGNIFICANT CHANGE UP (ref 3.8–10.5)
WBC # FLD AUTO: 7.6 K/UL — SIGNIFICANT CHANGE UP (ref 3.8–10.5)

## 2018-11-16 PROCEDURE — 93325 DOPPLER ECHO COLOR FLOW MAPG: CPT | Mod: 26

## 2018-11-16 PROCEDURE — 71045 X-RAY EXAM CHEST 1 VIEW: CPT | Mod: 26

## 2018-11-16 PROCEDURE — 76376 3D RENDER W/INTRP POSTPROCES: CPT | Mod: 26

## 2018-11-16 PROCEDURE — 93312 ECHO TRANSESOPHAGEAL: CPT | Mod: 26

## 2018-11-16 PROCEDURE — 93320 DOPPLER ECHO COMPLETE: CPT | Mod: 26

## 2018-11-16 PROCEDURE — 99254 IP/OBS CNSLTJ NEW/EST MOD 60: CPT

## 2018-11-16 PROCEDURE — 99291 CRITICAL CARE FIRST HOUR: CPT

## 2018-11-16 RX ORDER — FUROSEMIDE 40 MG
20 TABLET ORAL ONCE
Qty: 0 | Refills: 0 | Status: COMPLETED | OUTPATIENT
Start: 2018-11-16 | End: 2018-11-16

## 2018-11-16 RX ORDER — POTASSIUM CHLORIDE 20 MEQ
20 PACKET (EA) ORAL ONCE
Qty: 0 | Refills: 0 | Status: COMPLETED | OUTPATIENT
Start: 2018-11-16 | End: 2018-11-16

## 2018-11-16 RX ORDER — FENTANYL CITRATE 50 UG/ML
100 INJECTION INTRAVENOUS
Qty: 0 | Refills: 0 | Status: DISCONTINUED | OUTPATIENT
Start: 2018-11-16 | End: 2018-11-16

## 2018-11-16 RX ORDER — FENTANYL CITRATE 50 UG/ML
50 INJECTION INTRAVENOUS ONCE
Qty: 0 | Refills: 0 | Status: DISCONTINUED | OUTPATIENT
Start: 2018-11-16 | End: 2018-11-16

## 2018-11-16 RX ORDER — HEPARIN SODIUM 5000 [USP'U]/ML
5600 INJECTION INTRAVENOUS; SUBCUTANEOUS EVERY 6 HOURS
Qty: 0 | Refills: 0 | Status: DISCONTINUED | OUTPATIENT
Start: 2018-11-16 | End: 2018-11-17

## 2018-11-16 RX ORDER — MIDAZOLAM HYDROCHLORIDE 1 MG/ML
2 INJECTION, SOLUTION INTRAMUSCULAR; INTRAVENOUS
Qty: 0 | Refills: 0 | Status: DISCONTINUED | OUTPATIENT
Start: 2018-11-16 | End: 2018-11-16

## 2018-11-16 RX ORDER — MIDAZOLAM HYDROCHLORIDE 1 MG/ML
1 INJECTION, SOLUTION INTRAMUSCULAR; INTRAVENOUS ONCE
Qty: 0 | Refills: 0 | Status: DISCONTINUED | OUTPATIENT
Start: 2018-11-16 | End: 2018-11-16

## 2018-11-16 RX ORDER — HEPARIN SODIUM 5000 [USP'U]/ML
INJECTION INTRAVENOUS; SUBCUTANEOUS
Qty: 25000 | Refills: 0 | Status: DISCONTINUED | OUTPATIENT
Start: 2018-11-16 | End: 2018-11-17

## 2018-11-16 RX ORDER — CAPTOPRIL 12.5 MG/1
6.25 TABLET ORAL ONCE
Qty: 0 | Refills: 0 | Status: COMPLETED | OUTPATIENT
Start: 2018-11-16 | End: 2018-11-16

## 2018-11-16 RX ORDER — CAPTOPRIL 12.5 MG/1
6.25 TABLET ORAL EVERY 8 HOURS
Qty: 0 | Refills: 0 | Status: DISCONTINUED | OUTPATIENT
Start: 2018-11-16 | End: 2018-11-17

## 2018-11-16 RX ADMIN — MIDAZOLAM HYDROCHLORIDE 2 MILLIGRAM(S): 1 INJECTION, SOLUTION INTRAMUSCULAR; INTRAVENOUS at 10:50

## 2018-11-16 RX ADMIN — FENTANYL CITRATE 50 MICROGRAM(S): 50 INJECTION INTRAVENOUS at 10:55

## 2018-11-16 RX ADMIN — HEPARIN SODIUM 5600 UNIT(S): 5000 INJECTION INTRAVENOUS; SUBCUTANEOUS at 06:48

## 2018-11-16 RX ADMIN — Medication 20 MILLIEQUIVALENT(S): at 06:36

## 2018-11-16 RX ADMIN — Medication 20 MILLIGRAM(S): at 17:52

## 2018-11-16 RX ADMIN — CAPTOPRIL 6.25 MILLIGRAM(S): 12.5 TABLET ORAL at 17:52

## 2018-11-16 RX ADMIN — Medication 81 MILLIGRAM(S): at 14:39

## 2018-11-16 RX ADMIN — CAPTOPRIL 6.25 MILLIGRAM(S): 12.5 TABLET ORAL at 22:23

## 2018-11-16 RX ADMIN — FENTANYL CITRATE 50 MICROGRAM(S): 50 INJECTION INTRAVENOUS at 10:50

## 2018-11-16 RX ADMIN — HEPARIN SODIUM 1300 UNIT(S)/HR: 5000 INJECTION INTRAVENOUS; SUBCUTANEOUS at 06:35

## 2018-11-16 RX ADMIN — MIDAZOLAM HYDROCHLORIDE 1 MILLIGRAM(S): 1 INJECTION, SOLUTION INTRAMUSCULAR; INTRAVENOUS at 10:55

## 2018-11-16 RX ADMIN — Medication 12.5 MILLIGRAM(S): at 06:36

## 2018-11-16 RX ADMIN — ATORVASTATIN CALCIUM 80 MILLIGRAM(S): 80 TABLET, FILM COATED ORAL at 22:23

## 2018-11-16 RX ADMIN — HEPARIN SODIUM 1000 UNIT(S)/HR: 5000 INJECTION INTRAVENOUS; SUBCUTANEOUS at 00:33

## 2018-11-16 RX ADMIN — Medication 1: at 17:53

## 2018-11-16 RX ADMIN — HEPARIN SODIUM 1700 UNIT(S)/HR: 5000 INJECTION INTRAVENOUS; SUBCUTANEOUS at 21:24

## 2018-11-16 RX ADMIN — HEPARIN SODIUM 1500 UNIT(S)/HR: 5000 INJECTION INTRAVENOUS; SUBCUTANEOUS at 14:35

## 2018-11-16 NOTE — CONSULT NOTE ADULT - SUBJECTIVE AND OBJECTIVE BOX
History of Present Illness:   This is a  59 y/o male with PMH of T2DM who presented to [facility] with SOB and midsternal chest tightness. In the ED, D-Dimer was 681, lactate 6.9, BNP 1924 CT chest was negative for PE. CXR showed extensive regions of parenchymal consolidation and groundglass opacities to bilateral lung fields with small/moderate pleural effusions, lasix 40 mg IVP and duoNebs x1 were given and placed on BiPAP.  Echo also showed severe global dysfunction with elevated Trop 0.6, pt was loaded with  mg and Plavix 600 mg and transferred to [facility] for NSTEMI.      In ED, continued with BiPAP and pt was given lasix 40 mg x2 IVP with output of 1L and started on nitroglycerin gtt for elevated BP.  Transferred to CCU for further management. (2018 15:02)   CT Surgery consulted for TVD / Severe MR    Past Medical History  Diabetes mellitus      Past Surgical History  No significant past surgical history  MEDICATIONS  (STANDING):  aspirin enteric coated 81 milliGRAM(s) Oral daily  atorvastatin 80 milliGRAM(s) Oral at bedtime  dextrose 5%. 1000 milliLiter(s) (50 mL/Hr) IV Continuous <Continuous>  dextrose 50% Injectable 12.5 Gram(s) IV Push once  dextrose 50% Injectable 25 Gram(s) IV Push once  dextrose 50% Injectable 25 Gram(s) IV Push once  heparin  Infusion.  Unit(s)/Hr (10 mL/Hr) IV Continuous <Continuous>  influenza   Vaccine 0.5 milliLiter(s) IntraMuscular once  insulin lispro (HumaLOG) corrective regimen sliding scale   SubCutaneous at bedtime  insulin lispro (HumaLOG) corrective regimen sliding scale   SubCutaneous three times a day before meals  metoprolol tartrate 12.5 milliGRAM(s) Oral every 12 hours  MEDICATIONS  (PRN):  dextrose 40% Gel 15 Gram(s) Oral once PRN Blood Glucose LESS THAN 70 milliGRAM(s)/deciliter  glucagon  Injectable 1 milliGRAM(s) IntraMuscular once PRN Glucose LESS THAN 70 milligrams/deciliter  heparin  Injectable 5600 Unit(s) IV Push every 6 hours PRN For aPTT less than 40      Vital Signs Last 24 Hrs  T(C): 36.4 (18 @ 14:30), Max: 36.7 (18 @ 04:00)  T(F): 97.5 (18 @ 14:30), Max: 98.1 (18 @ 07:00)  HR: 82 (18 @ 14:30) (70 - 102)  BP: 107/74 (18 @ 14:30) (93/68 - 127/88)  RR: 17 (18 @ 14:30) (12 - 25)  SpO2: 99% (18 @ 14:30) (90% - 99%)             Daily Weight in k.6 (2018 13:47)  Admit Wt: Drug Dosing Weight  Height (cm): 180.34 (2018 14:34)  Weight (kg): 94.3 (2018 14:34)  BMI (kg/m2): 29 (2018 14:34)  BSA (m2): 2.14 (2018 14:34)    Allergies: No Known Allergies      SOCIAL HISTORY:   Employed [employer]   lives with spouse  Smoker: [ ] Yes  [x ] No        PACK YEARS:                         WHEN QUIT?  ETOH use: [ ] Yes  [x ] No              FREQUENCY / QUANTITY:  Ilicit Drug use:  [ ] Yes  [x ] No    FAMILY HISTORY:  Family history of thyroid disease (Mother)  Family history of Parkinson disease (Mother)  Family history of hypertension (Father)  Family history of diabetes mellitus in father (Father)  Family history of CABG (Father)      Review of Systems  GENERAL:  no weakness, fatigue, fevers or chills  NEURO: no dizziness, numbness, tingling or weakness  SKIN: no itching, burning, rashes, or lesions   HEENT: no visual changes;  no headache, no vertigo, no recent colds  RESPIRATORY: no shortness of breath, no cough, sputum, wheezing, hemoptysis;   CARDIOVASCULAR:  no chest pain,  or palpitations  GI: no abd pain. no N/V/D.  PERIPHERAL VASCULAR: no swelling, no tenderness, no erythema, no varicose veins.     PHYSICAL EXAM  General: Well nourished, well developed, NAD.                                              Neuro: Normal exam oriented to person/place & time with no focal motor or sensory  deficits.                    Eyes: Normal exam of conjunctiva & lids, pupils equally reactive.   ENT: Normal exam of nasal/oral mucosa with absence of cyanosis.   Neck: Normal exam of jugular veins, trachea & thyroid.   Chest: Normal lung exam with good air movement absence of wheezes, rales, or rhonchi:                                                                          CV:  Auscultation: normal S1S2,  regular   2/6  Murmurs   Carotids: No Bruits[x ]  Abdominal  LABS:                        14.6   7.6   )-----------( 167      ( 2018 05:35 )             42.9     -    139  |  103  |  20  ----------------------------<  153<H>  3.9   |  21<L>  |  0.83    Ca    9.0      2018 05:35  Phos  2.8       Mg     2.0         TPro  6.4  /  Alb  3.8  /  TBili  1.1  /  DBili  x   /  AST  36  /  ALT  40  /  AlkPhos  48      PT/INR - ( 15 Nov 2018 04:37 )   PT: 13.2 sec;   INR: 1.14 ratio         PTT - ( 2018 13:43 )  PTT:48.1 sec      Cardiac Cath:< from: Cardiac Cath Lab - Adult (11.15.18 @ 14:16) >  ENTRICLES: Global left ventricular function was severely depressed. EF  estimated was 15 %.  VALVES: MITRAL VALVE: The mitral valve exhibited severe regurgitation.  CORONARY VESSELS: The coronary circulation is right dominant.  LM:   --  LM: Normal.  LAD: --  Proximal LAD: There was a 90 % stenosis.  CX:   --  Mid circumflex: There was a 100 % stenosis.  RCA:   --  Proximal RCA: There was a 100 % stenosis.  COMPLICATIONS: There were no complications.  DIAGNOSTIC RECOMMENDATIONS: Consultation with a cardiac surgeon will be  obtained for surgical opinion and coronary artery bypass grafting.  Prepared and signed by  Antonio Ruiz M.D.          TTE / VERN:< from: VERN w/o TTE (w/3D Echo) (18 @ 11:55) >  1. Tethered mitral valve leaflets with normal opening.  Dilateted mitral annulus (4.5 cm). Severe mitral  regurgitation.  Blunted systolic pulmonary vein flow.  ERO 0.54 cm sq  MR Volume 70 ml.  2. Severe left atrial enlargement.    No left atrial or  left atrial appendage thrombus.   Decreased left atrial  appendage velocities noted.  3. Severe left ventricular enlargement.  4. Severe global left ventricular systolic dysfunction.  Peak left ventricular outflow tract gradient equals 1 mm  Hg, LVOT velocity time integral equals 9 cm.  Calculated  stroke volume 39 ml/beat.  LVEDV 230 ml  LVESV 193 ml  SV 37 ml  EF est 16%.  5. Severe diastolic dysfunction (Stage III).  6. Agitated saline injection and color flow Doppler  demonstrates no evidence of a patent foramen ovale.  -----------------------------------------------------------------------

## 2018-11-16 NOTE — DIETITIAN INITIAL EVALUATION ADULT. - ADHERENCE
poor/Pt states he attempts to eat low sodium diet, but often eats a lot of take out poor/Pt states he attempts to eat low sodium diet, but often eats a lot of take out and deli meats, does not monitor carbohydrates

## 2018-11-16 NOTE — CONSULT NOTE ADULT - PROBLEM SELECTOR PROBLEM 1
Coronary artery disease involving native heart with unstable angina pectoris, unspecified vessel or lesion type

## 2018-11-16 NOTE — CONSULT NOTE ADULT - ASSESSMENT
This is a  59 y/o male in general good health active and employed  with PMH of T2DM who presented to [facility] with SOB and midsternal chest tightness. In the ED, D-Dimer was 681, lactate 6.9, BNP 1924 CT chest was negative for PE. CXR showed extensive regions of parenchymal consolidation and groundglass opacities to bilateral lung fields with small/moderate pleural effusions, lasix 40 mg IVP and duoNebs x1 were given and placed on BiPAP.  Echo also showed severe global dysfunction with elevated Trop 0.6, pt was loaded with  mg and Plavix 600 mg and transferred to [facility] for NSTEMI.    11/15 Cardiac cath at [facility]  reveals severe global dysfunction EF 15% Severe MR pLAD 905, [id number]% and pRCA 100%  11/16 VERN reveals  1. Tethered mitral valve leaflets with normal opening.Dilateted mitral annulus (4.5 cm). Severe mitralregurgitation.  Blunted systolic pulmonary vein flow. ERO 0.54 cm sq MR Volume 70 ml.  2. Severe left atrial enlargement.    No left atrial orleft atrial appendage thrombus.   Decreased left atrial appendage velocities noted.  3. Severe left ventricular enlargement.  4. Severe global left ventricular systolic dysfunction. Peak left ventricular outflow tract gradient equals 1 mm Hg, LVOT velocity time integral equals 9 cm.  Calculated  stroke volume 39 ml/beat. LVEDV 230 ml LVESV 193 ml SV 37 m EF est 16%.  5. Severe diastolic dysfunction (Stage III).  6. Agitated saline injection and color flow Doppler  demonstrates no evidence of a patent foramen oval    Cardiac surgery consult called patient on 3LNC  in no acute distress amenable to surgery.

## 2018-11-16 NOTE — DIETITIAN INITIAL EVALUATION ADULT. - OTHER INFO
Nutrition consult for diet education. 59 y/o male admitted for chest pain, found to have NSTEMI c/b AdHF and MR. Pt with T2DM, A1C of 7.1% on metformin at home. Now NPO for pending VERN of mitral valve, but PTA had good appetite with no significant changes in PO intake or weight. Pt reported "watching salt and butter" in his diet, however dietary recall showed diet high in take out, deli meats and other high sodium foods. Pt does not monitor carbohydrates but does try to have whole grains when possible. Pt reports never receiving or does not remember receiving any diet education on heart healthy diets or to manage his diabetes. Verbal and written education given. No complaints of N/V, last BM this morning and no difficulties chewing or swallowing. NKFA Nutrition consult for diet education. 59 y/o male admitted for chest pain, found to have NSTEMI c/b AdHF and MR. Pt with T2DM, A1C of 7.1% on metformin at home. Reports good appetite, eating 100% of meals. No recent weight changes noted. Pt reported "watching salt and butter" in his diet, however dietary recall showed diet high in take out, deli meats and other high sodium foods. Pt does not monitor carbohydrates but does try to have whole grains when possible. Pt reports never receiving or does not remember receiving any diet education on heart healthy diets or to manage his diabetes. Verbal and written education given. No complaints of N/V, last BM this morning and no difficulties chewing or swallowing. NKFA Nutrition consult for diet education. 59 y/o male admitted for chest pain, found to have NSTEMI c/b AdHF and MR. Pt with T2DM, A1C of 7.1% on metformin at home. Reports good appetite, eating 100% of meals. No recent weight changes noted. Pt reported "watching salt and butter" in his diet, however dietary recall showed diet high in take out, deli meats and other high sodium foods. Pt does not monitor carbohydrates but does try to have whole grains when possible. Does not check his blood sugar levels. Pt reports never receiving or does not remember receiving any diet education on heart healthy diets or to manage his diabetes. Verbal and written education given. No complaints of N/V, last BM this morning and no difficulties chewing or swallowing. NKFA

## 2018-11-16 NOTE — DIETITIAN INITIAL EVALUATION ADULT. - PERTINENT MEDS FT
MEDICATIONS  (STANDING):    atorvastatin 80 milliGRAM(s) Oral at bedtime  insulin lispro (HumaLOG) corrective regimen sliding scale   SubCutaneous at bedtime  insulin lispro (HumaLOG) corrective regimen sliding scale   SubCutaneous three times a day before meals  metoprolol tartrate 12.5 milliGRAM(s) Oral every 12 hours

## 2018-11-16 NOTE — DIETITIAN INITIAL EVALUATION ADULT. - NUTRITIONGOAL OUTCOME1
Pt will adhere to recommended diet and reduce blood sugars and A1C to normal range Pt will be able to recall 3 teach back points from the diet education

## 2018-11-16 NOTE — DIETITIAN INITIAL EVALUATION ADULT. - ENERGY NEEDS
Ht:  71"       Wt:  203 lb       BMI: 28.4 kg/m2         IBW: 165 lb       %IBW:  124%  As per chart: No edema or pressure ulcers  POCT Blood glucose: 11/14-11/16: 167-222 Ht:  71"       Wt:  203 lb       BMI: 28.4 kg/m2         IBW: 165 lb       %IBW:  124%  As per chart: No edema or pressure ulcers

## 2018-11-16 NOTE — PROGRESS NOTE ADULT - PROBLEM SELECTOR PLAN 1
- Pt appears euvolemic at this time w/ clear breath sounds and no edema.  - Currently urine output 1050 net -170  - Continue - Pt appears euvolemic at this time w/ clear breath sounds and no edema.  - Currently urine output 1050 net -170  - Continue coreg 3.125 for now, will consider adding ACE once VERN completed and MR evaluated.

## 2018-11-16 NOTE — DIETITIAN INITIAL EVALUATION ADULT. - PERTINENT LABORATORY DATA
11-16 Na139 mmol/L Glu 153 mg/dL<H> K+ 3.9 mmol/L Cr  0.83 mg/dL BUN 20 mg/dL Phos 2.8 mg/dL Alb 3.8 g/dL 11-16 Na139 mmol/L Glu 153 mg/dL<H> K+ 3.9 mmol/L Cr  0.83 mg/dL BUN 20 mg/dL Phos 2.8 mg/dL Alb 3.8 g/dL    POCT Blood glucose: 11/14-11/16: 167-222

## 2018-11-16 NOTE — PROGRESS NOTE ADULT - SUBJECTIVE AND OBJECTIVE BOX
Events:    Review Of Systems:  Constitutional: [ ] Fever [ ] Chills [ ] Fatigue [ ] Weight change   HEENT: [ ] Blurred vision [ ] Eye Pain [ ] Headache [ ] Runny nose [ ] Sore Throat   Respiratory: [ ] Cough [ ] Wheezing [ ] Shortness of breath  Cardiovascular: [ ] Chest Pain [ ] Palpitations [ ] ARAIZA [ ] PND [ ] Orthopnea  Gastrointestinal: [ ] Abdominal Pain [ ] Diarrhea [ ] Constipation [ ] Hemorrhoids [ ] Nausea [ ] Vomiting  Genitourinary: [ ] Nocturia [ ] Dysuria [ ] Incontinence  Extremities: [ ] Swelling [ ] Joint Pain  Neurologic: [ ] Focal deficit [ ] Paresthesias [ ] Syncope  Lymphatic: [ ] Swelling [ ] Lymphadenopathy   Skin: [ ] Rash [ ] Ecchymoses [ ] Wounds [ ] Lesions  Psychiatry: [ ] Depression [ ] Suicidal/Homicidal Ideation [ ] Anxiety [ ] Sleep Disturbances  [ ] 10 point review of systems is otherwise negative except as mentioned above            [ ]Unable to obtain    Medications:  aspirin enteric coated 81 milliGRAM(s) Oral daily  atorvastatin 80 milliGRAM(s) Oral at bedtime  clopidogrel Tablet 75 milliGRAM(s) Oral daily  dextrose 40% Gel 15 Gram(s) Oral once PRN  dextrose 5%. 1000 milliLiter(s) IV Continuous <Continuous>  dextrose 50% Injectable 12.5 Gram(s) IV Push once  dextrose 50% Injectable 25 Gram(s) IV Push once  dextrose 50% Injectable 25 Gram(s) IV Push once  glucagon  Injectable 1 milliGRAM(s) IntraMuscular once PRN  heparin  Infusion.  Unit(s)/Hr IV Continuous <Continuous>  heparin  Injectable 5600 Unit(s) IV Push every 6 hours PRN  influenza   Vaccine 0.5 milliLiter(s) IntraMuscular once  insulin lispro (HumaLOG) corrective regimen sliding scale   SubCutaneous at bedtime  insulin lispro (HumaLOG) corrective regimen sliding scale   SubCutaneous three times a day before meals  metoprolol tartrate 12.5 milliGRAM(s) Oral every 12 hours    PMH/PSH/FH/SH: [ ] Unchanged  Vitals:  T(C): 36.7 (18 @ 07:00), Max: 36.7 (11-15-18 @ 15:05)  HR: 80 (18 @ 08:00) (70 - 102)  BP: 127/88 (11-16-18 @ 08:00) (87/69 - 127/88)  BP(mean): 100 (18 @ 08:00) (72 - 100)  RR: 18 (18 @ 08:00) (11 - 31)  SpO2: 96% (18 @ 08:00) (93% - 97%)  Wt(kg): --  Daily     Daily Weight in k.2 (2018 01:05)  I&O's Summary    15 Nov 2018 07:  -  2018 07:00  --------------------------------------------------------  IN: 890 mL / OUT: 1050 mL / NET: -160 mL    2018 07:01  -  2018 08:23  --------------------------------------------------------  IN: 10 mL / OUT: 0 mL / NET: 10 mL        Physical Exam:  Appearance: [ ] Normal [ ] NAD  Eyes: [ ] PERRL [ ] EOMI  HENT: [ ] Normal oral muscosa [ ]NC/AT  Cardiovascular: [ ] S1 [ ] S2 [ ] RRR [ ] No m/r/g [ ]No edema [ ] JVP  Procedural Access Site: [ ] No hematoma [ ] Non-tender to palpation [ ] 2+ pulse [ ] No bruit [ ] No Ecchymosis  Respiratory: [ ] Clear to auscultation bilaterally  Gastrointestinal: [ ] Soft [ ] Non-tender [ ] Non-distended [ ] BS+  Musculoskeletal: [ ] No clubbing [ ] No joint deformity   Neurologic: [ ] Non-focal  Lymphatic: [ ] No lymphadenopathy  Psychiatry: [ ] AAOx3 [ ] Mood & affect appropriate  Skin: [ ] No rashes [ ] No ecchymoses [ ] No cyanosis        139  |  103  |  20  ----------------------------<  153<H>  3.9   |  21<L>  |  0.83    Ca    9.0      2018 05:35  Phos  2.8     11-16  Mg     2.0     11-16    TPro  6.4  /  Alb  3.8  /  TBili  1.1  /  DBili  x   /  AST  36  /  ALT  40  /  AlkPhos  48  11-16    PT/INR - ( 15 Nov 2018 04:37 )   PT: 13.2 sec;   INR: 1.14 ratio         PTT - ( 2018 05:35 )  PTT:33.3 sec  CARDIAC MARKERS ( 15 Nov 2018 13:44 )  x     / x     / 436 U/L / x     / 18.9 ng/mL  CARDIAC MARKERS ( 15 Nov 2018 04:37 )  x     / x     / 614 U/L / x     / 34.2 ng/mL  CARDIAC MARKERS ( 15 Nov 2018 04:21 )  x     / x     / 70 U/L / x     / 4.1 ng/mL  CARDIAC MARKERS ( 2018 20:57 )  x     / x     / 682 U/L / x     / 49.6 ng/mL  CARDIAC MARKERS ( 2018 15:36 )  x     / x     / 570 U/L / x     / 45.2 ng/mL              ECG:    Echo:    Stress Testing:     Cath:    Imaging:    Interpretation of Telemetry: HPI:  57 y/o M w/ PMH of T2DM who p/t [facility] w/ SOB and midsternal chest tightness. In the ED, D-Dimer was 681, lactate 6.9, BNP 1924. CXR showed extensive regions of parenchymal consolidation and groundglass opacities to bilateral lung fields with small/moderate pleural effusions, lasix 40 mg IVP and duoNebs x1 were given and placed on BiPAP.  Echo also showed severe global dysfunction with elevated Trop 0.6, pt was loaded with  mg and Plavix 600 mg and transferred to [facility] for NSTEMI.  In ED, continued with BiPAP and pt was given lasix 40 mg x2 IVP with output of 1L and started on nitroglycerin gtt for elevated BP.  Transferred to CCU for further management. (2018 15:02). Pt underwent cardiac cath and found to have triple vessel disease. CTS was consulted for CABG vs high risk PCI.     Events: No acute events overnight. Pt NPO for VERN today to evaluate mitral valve.    Review Of Systems:  Constitutional: denies Fever, Chills, Fatigue  HEENT: denies Blurred vision, Eye Pain,  Headache   Respiratory: denies Cough, Wheezing, Shortness of breath  Cardiovascular: denies Chest Pain, Palpitations, ARAIZA  Gastrointestinal: denies Abdominal Pain, Diarrhea, Constipation   Genitourinary: denies Nocturia, Dysuria, Incontinence  Extremities: denies Swelling , Joint Pain  Neurologic: denies Focal deficit, Paresthesias, Syncope  Lymphatic: denies Swelling, Lymphadenopathy   Skin: denies Rash, Ecchymoses, Wounds  Psychiatry: denies Depression, Suicidal/Homicidal Ideation, Anxiety   [X ] 10 point review of systems is otherwise negative except as mentioned above                Medications:  aspirin enteric coated 81 milliGRAM(s) Oral daily  atorvastatin 80 milliGRAM(s) Oral at bedtime  clopidogrel Tablet 75 milliGRAM(s) Oral daily  dextrose 40% Gel 15 Gram(s) Oral once PRN  dextrose 5%. 1000 milliLiter(s) IV Continuous <Continuous>  dextrose 50% Injectable 12.5 Gram(s) IV Push once  dextrose 50% Injectable 25 Gram(s) IV Push once  dextrose 50% Injectable 25 Gram(s) IV Push once  glucagon  Injectable 1 milliGRAM(s) IntraMuscular once PRN  heparin  Infusion.  Unit(s)/Hr IV Continuous <Continuous>  heparin  Injectable 5600 Unit(s) IV Push every 6 hours PRN  influenza   Vaccine 0.5 milliLiter(s) IntraMuscular once  insulin lispro (HumaLOG) corrective regimen sliding scale   SubCutaneous at bedtime  insulin lispro (HumaLOG) corrective regimen sliding scale   SubCutaneous three times a day before meals  metoprolol tartrate 12.5 milliGRAM(s) Oral every 12 hours    Vitals:  T(C): 36.7 (18 @ 07:00), Max: 36.7 (11-15-18 @ 15:05)  HR: 80 (18 @ 08:00) (70 - 102)  BP: 127/88 (18 @ 08:00) (87/69 - 127/88)  BP(mean): 100 (18 @ 08:00) (72 - 100)  RR: 18 (18 @ 08:00) (11 - 31)  SpO2: 96% (18 @ 08:00) (93% - 97%)    Daily     Daily Weight in k.2 (2018 01:05)  I&O's Summary    15 Nov 2018 07:  -  2018 07:00  --------------------------------------------------------  IN: 890 mL / OUT: 1050 mL / NET: -160 mL    2018 07:  -  2018 08:23  --------------------------------------------------------  IN: 10 mL / OUT: 0 mL / NET: 10 mL    Physical Exam:  Appearance: [ X] Normal [X ] NAD  Eyes: [X ] PERRL [X ] EOMI  HENT: [X ] Normal oral muscosa [ X]NC/AT  Cardiovascular: [X ] S1 [X ] S2 [X ] RRR   Respiratory: [.X ] Clear to auscultation bilaterally  Gastrointestinal: [ X] Soft [ X] Non-tender [X ] Non-distended   Musculoskeletal: [X ] No clubbing [X ] No joint deformity   Neurologic: [X ] Non-focal  Lymphatic: [X ] No lymphadenopathy  Psychiatry: [X ] AAOx3 [X ] Mood & affect appropriate  Skin: [X ] No rashes [ X] No ecchymoses [X ] No cyanosis        139  |  103  |  20  ----------------------------<  153<H>  3.9   |  21<L>  |  0.83    Ca    9.0      2018 05:35  Phos  2.8       Mg     2.0         TPro  6.4  /  Alb  3.8  /  TBili  1.1  /  DBili  x   /  AST  36  /  ALT  40  /  AlkPhos  48      PT/INR - ( 15 Nov 2018 04:37 )   PT: 13.2 sec;   INR: 1.14 ratio      PTT - ( 2018 05:35 )  PTT:33.3 sec  CARDIAC MARKERS ( 15 Nov 2018 13:44 )  x     / x     / 436 U/L / x     / 18.9 ng/mL  CARDIAC MARKERS ( 15 Nov 2018 04:37 )  x     / x     / 614 U/L / x     / 34.2 ng/mL  CARDIAC MARKERS ( 15 Nov 2018 04:21 )  x     / x     / 70 U/L / x     / 4.1 ng/mL  CARDIAC MARKERS ( 2018 20:57 )  x     / x     / 682 U/L / x     / 49.6 ng/mL  CARDIAC MARKERS ( 2018 15:36 )  x     / x     / 570 U/L / x     / 45.2 ng/mL    ECG:nsr 70, BENJI, LBBB    Echo: < from: Transthoracic Echocardiogram (18 @ 13:43) >  EF (Visual Estimate): 15 %  1. Eccentric left ventricular hypertrophy (dilated left  ventricle with normal relative wall thickness).  2. Severe global left ventricular systolic dysfunction.  3. Normal right ventricular size with decreased right  ventricular systolic function.    Cath: prox LAD 90%, Cx 100%, %, RHC MR +2-+3    Interpretation of Telemetry: NSR 75 BENJI

## 2018-11-16 NOTE — DIETITIAN INITIAL EVALUATION ADULT. - NS AS NUTRI INTERV MEALS SNACK
Continue consistent carbohydrate DASH diet when medically feasible/General/healthful diet Continue consistent carbohydrate DASH/TLC diet/General/healthful diet

## 2018-11-16 NOTE — STUDENT SIGN OFF DOCUMENT - DOCUMENTS STUDENTS ARE SIGNED OFF ON
Vital Signs/Assessment and Intervention/Plan of Care/Input and Output Dietitian Initial Evaluation/Patient Profile/Provider Contact Note

## 2018-11-16 NOTE — DIETITIAN INITIAL EVALUATION ADULT. - NS AS NUTRI INTERV ED CONTENT
Priority modifications/Nutrition relationship to health/disease/Purpose of the nutrition education/Gave verbal and written education on heart healthy consistent carbohydrate diet, better choices when dining out, importance of checking blood sugars regularly, how to read nutrition label/Survival information

## 2018-11-16 NOTE — PROGRESS NOTE ADULT - PROBLEM SELECTOR PLAN 2
- VERN today to assess MR, RHC showed +2-+3 MR   - CTS following for potential TVD w/ or w/o MVR, TTE showed mild MR

## 2018-11-17 LAB
ALBUMIN SERPL ELPH-MCNC: 3.8 G/DL — SIGNIFICANT CHANGE UP (ref 3.3–5)
ALP SERPL-CCNC: 50 U/L — SIGNIFICANT CHANGE UP (ref 40–120)
ALT FLD-CCNC: 39 U/L — SIGNIFICANT CHANGE UP (ref 10–45)
ANION GAP SERPL CALC-SCNC: 15 MMOL/L — SIGNIFICANT CHANGE UP (ref 5–17)
APTT BLD: 68.6 SEC — HIGH (ref 27.5–36.3)
APTT BLD: 74.5 SEC — HIGH (ref 27.5–36.3)
AST SERPL-CCNC: 32 U/L — SIGNIFICANT CHANGE UP (ref 10–40)
BILIRUB SERPL-MCNC: 0.9 MG/DL — SIGNIFICANT CHANGE UP (ref 0.2–1.2)
BUN SERPL-MCNC: 22 MG/DL — SIGNIFICANT CHANGE UP (ref 7–23)
CALCIUM SERPL-MCNC: 9.2 MG/DL — SIGNIFICANT CHANGE UP (ref 8.4–10.5)
CHLORIDE SERPL-SCNC: 102 MMOL/L — SIGNIFICANT CHANGE UP (ref 96–108)
CO2 SERPL-SCNC: 22 MMOL/L — SIGNIFICANT CHANGE UP (ref 22–31)
CREAT SERPL-MCNC: 0.87 MG/DL — SIGNIFICANT CHANGE UP (ref 0.5–1.3)
GLUCOSE BLDC GLUCOMTR-MCNC: 121 MG/DL — HIGH (ref 70–99)
GLUCOSE BLDC GLUCOMTR-MCNC: 142 MG/DL — HIGH (ref 70–99)
GLUCOSE BLDC GLUCOMTR-MCNC: 154 MG/DL — HIGH (ref 70–99)
GLUCOSE BLDC GLUCOMTR-MCNC: 155 MG/DL — HIGH (ref 70–99)
GLUCOSE SERPL-MCNC: 149 MG/DL — HIGH (ref 70–99)
HCT VFR BLD CALC: 43.2 % — SIGNIFICANT CHANGE UP (ref 39–50)
HGB BLD-MCNC: 14.6 G/DL — SIGNIFICANT CHANGE UP (ref 13–17)
MAGNESIUM SERPL-MCNC: 2.1 MG/DL — SIGNIFICANT CHANGE UP (ref 1.6–2.6)
MCHC RBC-ENTMCNC: 29.7 PG — SIGNIFICANT CHANGE UP (ref 27–34)
MCHC RBC-ENTMCNC: 33.9 GM/DL — SIGNIFICANT CHANGE UP (ref 32–36)
MCV RBC AUTO: 87.8 FL — SIGNIFICANT CHANGE UP (ref 80–100)
PHOSPHATE SERPL-MCNC: 3.4 MG/DL — SIGNIFICANT CHANGE UP (ref 2.5–4.5)
PLATELET # BLD AUTO: 163 K/UL — SIGNIFICANT CHANGE UP (ref 150–400)
POTASSIUM SERPL-MCNC: 4 MMOL/L — SIGNIFICANT CHANGE UP (ref 3.5–5.3)
POTASSIUM SERPL-SCNC: 4 MMOL/L — SIGNIFICANT CHANGE UP (ref 3.5–5.3)
PROT SERPL-MCNC: 6.4 G/DL — SIGNIFICANT CHANGE UP (ref 6–8.3)
RBC # BLD: 4.92 M/UL — SIGNIFICANT CHANGE UP (ref 4.2–5.8)
RBC # FLD: 13 % — SIGNIFICANT CHANGE UP (ref 10.3–14.5)
SODIUM SERPL-SCNC: 139 MMOL/L — SIGNIFICANT CHANGE UP (ref 135–145)
WBC # BLD: 6.4 K/UL — SIGNIFICANT CHANGE UP (ref 3.8–10.5)
WBC # FLD AUTO: 6.4 K/UL — SIGNIFICANT CHANGE UP (ref 3.8–10.5)

## 2018-11-17 PROCEDURE — 99255 IP/OBS CONSLTJ NEW/EST HI 80: CPT

## 2018-11-17 PROCEDURE — 71045 X-RAY EXAM CHEST 1 VIEW: CPT | Mod: 26

## 2018-11-17 PROCEDURE — 99291 CRITICAL CARE FIRST HOUR: CPT

## 2018-11-17 RX ORDER — METOPROLOL TARTRATE 50 MG
12.5 TABLET ORAL EVERY 12 HOURS
Qty: 0 | Refills: 0 | Status: DISCONTINUED | OUTPATIENT
Start: 2018-11-17 | End: 2018-11-19

## 2018-11-17 RX ORDER — METOPROLOL TARTRATE 50 MG
12.5 TABLET ORAL ONCE
Qty: 0 | Refills: 0 | Status: COMPLETED | OUTPATIENT
Start: 2018-11-17 | End: 2018-11-17

## 2018-11-17 RX ORDER — CAPTOPRIL 12.5 MG/1
12.5 TABLET ORAL EVERY 8 HOURS
Qty: 0 | Refills: 0 | Status: DISCONTINUED | OUTPATIENT
Start: 2018-11-17 | End: 2018-11-18

## 2018-11-17 RX ORDER — CAPTOPRIL 12.5 MG/1
12.5 TABLET ORAL EVERY 8 HOURS
Qty: 0 | Refills: 0 | Status: DISCONTINUED | OUTPATIENT
Start: 2018-11-17 | End: 2018-11-17

## 2018-11-17 RX ADMIN — Medication 81 MILLIGRAM(S): at 11:29

## 2018-11-17 RX ADMIN — Medication 12.5 MILLIGRAM(S): at 11:29

## 2018-11-17 RX ADMIN — Medication 1: at 08:29

## 2018-11-17 RX ADMIN — CAPTOPRIL 12.5 MILLIGRAM(S): 12.5 TABLET ORAL at 13:50

## 2018-11-17 RX ADMIN — HEPARIN SODIUM 1700 UNIT(S)/HR: 5000 INJECTION INTRAVENOUS; SUBCUTANEOUS at 03:53

## 2018-11-17 RX ADMIN — CAPTOPRIL 12.5 MILLIGRAM(S): 12.5 TABLET ORAL at 21:14

## 2018-11-17 RX ADMIN — ATORVASTATIN CALCIUM 80 MILLIGRAM(S): 80 TABLET, FILM COATED ORAL at 21:14

## 2018-11-17 RX ADMIN — CAPTOPRIL 12.5 MILLIGRAM(S): 12.5 TABLET ORAL at 05:36

## 2018-11-17 NOTE — PROGRESS NOTE ADULT - SUBJECTIVE AND OBJECTIVE BOX
Admission date:  CHIEF COMPLAINT:  HPI:  59 y/o male with PMH of T2DM who presented to [facility] with SOB and midsternal chest tightness. In the ED, D-Dimer was 681, lactate 6.9, BNP 1924 CT chest was negative for PE. CXR showed extensive regions of parenchymal consolidation and groundglass opacities to bilateral lung fields with small/moderate pleural effusions, lasix 40 mg IVP and duoNebs x1 were given and placed on BiPAP.  Echo also showed severe global dysfunction with elevated Trop 0.6, pt was loaded with  mg and Plavix 600 mg and transferred to [facility] for NSTEMI.      In ED, continued with BiPAP and pt was given lasix 40 mg x2 IVP with output of 1L and started on nitroglycerin gtt for elevated BP.  Transferred to CCU for further management. (2018 15:02)    INTERVAL HISTORY:    REVIEW OF SYSTEMS: Denies xxxxxx; all others negative    MEDICATIONS  (STANDING):  aspirin enteric coated 81 milliGRAM(s) Oral daily  atorvastatin 80 milliGRAM(s) Oral at bedtime  captopril 12.5 milliGRAM(s) Oral every 8 hours  dextrose 5%. 1000 milliLiter(s) (50 mL/Hr) IV Continuous <Continuous>  dextrose 50% Injectable 12.5 Gram(s) IV Push once  dextrose 50% Injectable 25 Gram(s) IV Push once  dextrose 50% Injectable 25 Gram(s) IV Push once  heparin  Infusion.  Unit(s)/Hr (10 mL/Hr) IV Continuous <Continuous>  influenza   Vaccine 0.5 milliLiter(s) IntraMuscular once  insulin lispro (HumaLOG) corrective regimen sliding scale   SubCutaneous at bedtime  insulin lispro (HumaLOG) corrective regimen sliding scale   SubCutaneous three times a day before meals    MEDICATIONS  (PRN):  dextrose 40% Gel 15 Gram(s) Oral once PRN Blood Glucose LESS THAN 70 milliGRAM(s)/deciliter  glucagon  Injectable 1 milliGRAM(s) IntraMuscular once PRN Glucose LESS THAN 70 milligrams/deciliter  heparin  Injectable 5600 Unit(s) IV Push every 6 hours PRN For aPTT less than 40      Objective:  ICU Vital Signs Last 24 Hrs  T(C): 36.6 (2018 05:00), Max: 36.7 (2018 20:00)  T(F): 97.8 (2018 05:00), Max: 98.1 (2018 20:00)  HR: 64 (2018 08:00) (60 - 108)  BP: 84/65 (2018 08:00) (80/52 - 124/83)  BP(mean): 73 (2018 08:00) (61 - 102)  ABP: --  ABP(mean): --  RR: 17 (2018 08:00) (12 - 20)  SpO2: 94% (2018 08:00) (90% - 99%)      Adult Advanced Hemodynamics Last 24 Hrs  CVP(mm Hg): --  CVP(cm H2O): --  CO: --  CI: --  PA: --  PA(mean): --  PCWP: --  SVR: --  SVRI: --  PVR: --  PVRI: --       @ 07:01  -   @ 07:00  --------------------------------------------------------  IN: 1191 mL / OUT: 2250 mL / NET: -1059 mL     @ 07:01  -   @ 08:18  --------------------------------------------------------  IN: 17 mL / OUT: 0 mL / NET: 17 mL      Daily     Daily Weight in k.2 (2018 05:00)    PHYSICAL EXAM:      Constitutional:    HEENT:    Respiratory:    Cardiovascular:    Gastrointestinal:    Genitourinary:    Extremities:    Vascular:    Neurological:    Skin:      TELEMETRY:     EKG:     IMAGING:    Labs:                          14.6   6.4   )-----------( 163      ( 2018 03:14 )             43.2         139  |  102  |  22  ----------------------------<  149<H>  4.0   |  22  |  0.87    Ca    9.2      2018 03:14  Phos  3.4       Mg     2.1         TPro  6.4  /  Alb  3.8  /  TBili  0.9  /  DBili  x   /  AST  32  /  ALT  39  /  AlkPhos  50      LIVER FUNCTIONS - ( 2018 03:14 )  Alb: 3.8 g/dL / Pro: 6.4 g/dL / ALK PHOS: 50 U/L / ALT: 39 U/L / AST: 32 U/L / GGT: x           PTT - ( 2018 03:14 )  PTT:68.6 sec        HEALTH ISSUES - PROBLEM Dx:  Mitral valve insufficiency, unspecified etiology: Mitral valve insufficiency, unspecified etiology  Coronary artery disease involving native heart with unstable angina pectoris, unspecified vessel or lesion type: Coronary artery disease involving native heart with unstable angina pectoris, unspecified vessel or lesion type  Diabetes mellitus: Diabetes mellitus  NSTEMI (non-ST elevated myocardial infarction): NSTEMI (non-ST elevated myocardial infarction)  Non-rheumatic mitral regurgitation: Non-rheumatic mitral regurgitation  Acute systolic heart failure: Acute systolic heart failure Admission date:  CHIEF COMPLAINT:  HPI:  59 y/o male with PMH of T2DM who presented to [facility] with SOB and midsternal chest tightness. In the ED, D-Dimer was 681, lactate 6.9, BNP 1924 CT chest was negative for PE. CXR showed extensive regions of parenchymal consolidation and groundglass opacities to bilateral lung fields with small/moderate pleural effusions, lasix 40 mg IVP and duoNebs x1 were given and placed on BiPAP.  Echo also showed severe global dysfunction with elevated Trop 0.6, pt was loaded with  mg and Plavix 600 mg and transferred to [facility] for NSTEMI.      In ED, continued with BiPAP and pt was given lasix 40 mg x2 IVP with output of 1L and started on nitroglycerin gtt for elevated BP.  Transferred to CCU for further management. (2018 15:02)    INTERVAL HISTORY: Patient seen and examined, denies CP, dyspnea, orthopnea, PND, palpitations and able to lay flat.      REVIEW OF SYSTEMS:    CONSTITUTIONAL: No weakness, fevers or chills  EYES/ENT: No visual changes;  No vertigo or throat pain   NECK: No pain or stiffness  RESPIRATORY: No cough, wheezing, hemoptysis; No shortness of breath  CARDIOVASCULAR: No chest pain or palpitations  GASTROINTESTINAL: No abdominal or epigastric pain. No nausea, vomiting, or hematemesis; No diarrhea or constipation. No melena or hematochezia.  GENITOURINARY: No dysuria, frequency or hematuria  NEUROLOGICAL: No numbness or weakness  SKIN: No itching, rashes    MEDICATIONS  (STANDING):  aspirin enteric coated 81 milliGRAM(s) Oral daily  atorvastatin 80 milliGRAM(s) Oral at bedtime  captopril 12.5 milliGRAM(s) Oral every 8 hours  dextrose 5%. 1000 milliLiter(s) (50 mL/Hr) IV Continuous <Continuous>  dextrose 50% Injectable 12.5 Gram(s) IV Push once  dextrose 50% Injectable 25 Gram(s) IV Push once  dextrose 50% Injectable 25 Gram(s) IV Push once  heparin  Infusion.  Unit(s)/Hr (10 mL/Hr) IV Continuous <Continuous>  influenza   Vaccine 0.5 milliLiter(s) IntraMuscular once  insulin lispro (HumaLOG) corrective regimen sliding scale   SubCutaneous at bedtime  insulin lispro (HumaLOG) corrective regimen sliding scale   SubCutaneous three times a day before meals    MEDICATIONS  (PRN):  dextrose 40% Gel 15 Gram(s) Oral once PRN Blood Glucose LESS THAN 70 milliGRAM(s)/deciliter  glucagon  Injectable 1 milliGRAM(s) IntraMuscular once PRN Glucose LESS THAN 70 milligrams/deciliter  heparin  Injectable 5600 Unit(s) IV Push every 6 hours PRN For aPTT less than 40      Objective:  ICU Vital Signs Last 24 Hrs  T(C): 36.6 (2018 05:00), Max: 36.7 (2018 20:00)  T(F): 97.8 (2018 05:00), Max: 98.1 (2018 20:00)  HR: 64 (2018 08:00) (60 - 108)  BP: 84/65 (2018 08:) (80/52 - 124/83)  BP(mean): 73 (2018 08:00) (61 - 102)  ABP: --  ABP(mean): --  RR: 17 (2018 08:00) (12 - 20)  SpO2: 94% (2018 08:00) (90% - 99%)      Adult Advanced Hemodynamics Last 24 Hrs     @ 07:  -   @ 07:00  --------------------------------------------------------  IN: 1191 mL / OUT: 2250 mL / NET: -1059 mL     @ 07:  -   @ 08:18  --------------------------------------------------------  IN: 17 mL / OUT: 0 mL / NET: 17 mL      Daily     Daily Weight in k.2 (2018 05:00)    PHYSICAL EXAM:    General: WN/WD NAD  Neurology: Awake, nonfocal, [place] x 4  Eyes: Scleras clear, PERRLA/ EOMI, Gross vision intact  ENT:Gross hearing intact, grossly patent pharynx, no stridor  Neck: Neck supple, trachea midline, No JVD,   Respiratory: Diminished at bases B/L, No wheezing, rales, rhonchi  CV: RRR, S1S2, no murmurs, rubs or gallops  Abdominal: Soft, NT, ND +BS,   Extremities: No edema, + peripheral pulses  Skin: No Rashes, Hematoma, Ecchymosis  Lymphatic: No Neck, axilla, groin LAD  Psych: Oriented x 3, normal affect    TELEMETRY:     EKG:   < from: 12 Lead ECG (11.15.18 @ 07:48) >  Ventricular Rate 84 BPM    Atrial Rate 84 BPM    P-R Interval 175 ms    QRS Duration 104 ms    Q-T Interval 354 ms    QTC Calculation(Bezet) 418 ms    P Axis 86 degrees    R Axis 147 degrees    T Axis 79 degrees    Diagnosis Line NORMAL SINUS RHYTHM  LEFT ATRIAL ENLARGEMENT  RIGHT AXIS DEVIATION  PULMONARY DISEASE PATTERN  SEPTAL INFARCT , AGE UNDETERMINED  ABNORMAL ECG    IMAGING:    Labs:                          14.6   6.4   )-----------( 163      ( 2018 03:14 )             43.2         139  |  102  |  22  ----------------------------<  149<H>  4.0   |  22  |  0.87    Ca    9.2      2018 03:14  Phos  3.4     -  Mg     2.1         TPro  6.4  /  Alb  3.8  /  TBili  0.9  /  DBili  x   /  AST  32  /  ALT  39  /  AlkPhos  50      LIVER FUNCTIONS - ( 2018 03:14 )  Alb: 3.8 g/dL / Pro: 6.4 g/dL / ALK PHOS: 50 U/L / ALT: 39 U/L / AST: 32 U/L / GGT: x           PTT - ( 2018 03:14 )  PTT:68.6 sec        HEALTH ISSUES - PROBLEM Dx:  Mitral valve insufficiency, unspecified etiology: Mitral valve insufficiency, unspecified etiology  Coronary artery disease involving native heart with unstable angina pectoris, unspecified vessel or lesion type: Coronary artery disease involving native heart with unstable angina pectoris, unspecified vessel or lesion type  Diabetes mellitus: Diabetes mellitus  NSTEMI (non-ST elevated myocardial infarction): NSTEMI (non-ST elevated myocardial infarction)  Non-rheumatic mitral regurgitation: Non-rheumatic mitral regurgitation  Acute systolic heart failure: Acute systolic heart failure

## 2018-11-17 NOTE — PROGRESS NOTE ADULT - PROBLEM SELECTOR PLAN 1
- Pt appears euvolemic at this time w/ clear breath sounds and no edema.  - Currently urine output 1050 net -170  - Continue coreg 3.125 for now, will consider adding ACE once VERN completed and MR evaluated.

## 2018-11-17 NOTE — PROGRESS NOTE ADULT - PROBLEM SELECTOR PLAN 2
- VERN today to assess MR, RHC showed +2-+3 MR   - CTS following for potential TVD w/ or w/o MVR, TTE showed mild MR - 11/16 VERN:   - CTS following for potential TVD w/ or w/o MVR, TTE showed mild MR

## 2018-11-17 NOTE — CHART NOTE - NSCHARTNOTEFT_GEN_A_CORE
====================  CCU MIDNIGHT ROUNDS  ====================    HOME RAMIREZ  [id number]    ====================  SUMMARY: HPI:  57 y/o male with PMH of T2DM who presented to [facility] with SOB and midsternal chest tightness. In the ED, D-Dimer was 681, lactate 6.9, BNP 1924 CT chest was negative for PE. CXR showed extensive regions of parenchymal consolidation and groundglass opacities to bilateral lung fields with small/moderate pleural effusions, lasix 40 mg IVP and duoNebs x1 were given and placed on BiPAP.  Echo also showed severe global dysfunction with elevated Trop 0.6, pt was loaded with  mg and Plavix 600 mg and transferred to [facility] for NSTEMI.      In ED, continued with BiPAP and pt was given lasix 40 mg x2 IVP with output of 1L and started on nitroglycerin gtt for elevated BP.  Transferred to CCU for further management. (14 Nov 2018 15:02)    ====================        ====================  NEW EVENTS:  ====================        ====================  VITALS (Last 12 hrs):  ====================    T(C): 36.7 (11-16-18 @ 20:00), Max: 36.7 (11-16-18 @ 20:00)  HR: 66 (11-16-18 @ 23:00) (66 - 108)  BP: 94/61 (11-16-18 @ 23:00) (94/61 - 124/83)  BP(mean): 70 (11-16-18 @ 23:00) (70 - 95)  ABP: --  ABP(mean): --  RR: 14 (11-16-18 @ 23:00) (14 - 20)  SpO2: 96% (11-16-18 @ 23:00) (94% - 99%)  Wt(kg): --  CVP(mm Hg): --  CO: --  CI: --  PA: --  PA(mean): --  PA(direct): --  PCWP: --  SVR: --    TELEMETRY:        *BLOOD GAS/ARTERIAL/MIXED/VENOUS  *LACTATE    I&O's Summary    15 Nov 2018 07:01  -  16 Nov 2018 07:00  --------------------------------------------------------  IN: 890 mL / OUT: 1050 mL / NET: -160 mL    16 Nov 2018 07:01  -  17 Nov 2018 00:15  --------------------------------------------------------  IN: 952 mL / OUT: 1900 mL / NET: -948 mL        ====================  PLAN:  ====================      WAI Bose-C  #[id number] ====================  CCU MIDNIGHT ROUNDS  ====================    HOME RAMIREZ  [id number]    ====================  SUMMARY: HPI:  57 y/o male with PMH of T2DM who presented to [facility] with SOB and midsternal chest tightness. In the ED, D-Dimer was 681, lactate 6.9, BNP 1924 CT chest was negative for PE. CXR showed extensive regions of parenchymal consolidation and groundglass opacities to bilateral lung fields with small/moderate pleural effusions, lasix 40 mg IVP and duoNebs x1 were given and placed on BiPAP.  Echo also showed severe global dysfunction with elevated Trop 0.6, pt was loaded with  mg and Plavix 600 mg and transferred to [facility] for NSTEMI.      In ED, continued with BiPAP and pt was given lasix 40 mg x2 IVP with output of 1L and started on nitroglycerin gtt for elevated BP.  Transferred to CCU for further management. (14 Nov 2018 15:02)    ====================        ====================  NEW EVENTS:  ====================  None      ====================  VITALS (Last 12 hrs):  ====================    T(C): 36.7 (11-16-18 @ 20:00), Max: 36.7 (11-16-18 @ 20:00)  HR: 66 (11-16-18 @ 23:00) (66 - 108)  BP: 94/61 (11-16-18 @ 23:00) (94/61 - 124/83)  BP(mean): 70 (11-16-18 @ 23:00) (70 - 95)  RR: 14 (11-16-18 @ 23:00) (14 - 20)  SpO2: 96% (11-16-18 @ 23:00) (94% - 99%)      TELEMETRY: NSR      I&O's Summary    15 Nov 2018 07:01  -  16 Nov 2018 07:00  --------------------------------------------------------  IN: 890 mL / OUT: 1050 mL / NET: -160 mL    16 Nov 2018 07:01  -  17 Nov 2018 00:15  --------------------------------------------------------  IN: 952 mL / OUT: 1900 mL / NET: -948 mL        ====================  PLAN:  ====================    NSTEMI s/p LHC pLAD 90%, LCx 100%, %, no intervention.  -C/w heparin gtt for ACS  -C/w ASA 81mg, plavix 75 mg, lipitor 80mg  -Continue captopril 6.25 and increase to 12.5 in AM if able  -VERN shows EF 16%, severe MR, severe LA/LV enlargement, severe stage III diastolic failure.  -Pending CTS recs  -Viability study?    Den Guerrero, CCU PA-C  #[id number]

## 2018-11-17 NOTE — CONSULT NOTE ADULT - ASSESSMENT
59 y/o M w/ h/o DM (on oral meds) who presented with acute SOB and chest tightness found to have 3vessel CAD and new HFrEF (EF 15-20%, LVEDD 6 cm) with possible moderate-severe MR. Currently normotensive and euvolemic. Labs reviewed. Overall stage C HF, NYHA class IV appears compensated and euvolemic.  - would give captopril 12. 5mg q8h; hold for SBP <90  - hold on further diuretics as euvolemic; possibly start lasix 20 mg PO daily tomorrow  -standing weights daily  - would do viability study to assess degree of viable myocardium; if significant viability, would consider CABG; if only anterior wall, can consider high risk PCI with Impella support  - restart metoprolol 12.5 mg q12  - CAD: on ASA 81 mg daily, lipitor 80 mg qhs  - discussed clinical course with patient and counseled on sodium/fluid restriction

## 2018-11-17 NOTE — CONSULT NOTE ADULT - SUBJECTIVE AND OBJECTIVE BOX
Patient is a 58y old  Male who presents with a chief complaint of SOB and chest tightness (2018 08:17)    HPI:  Briefly, 59 y/o M w/ h/o DM (on oral meds) who presented with acute SOB and chest tightness to [facility] initially and was found to be in decompensated heart failure with EF 15-20% so was transferred here on  for further management. Initially was volume overloaded so was given diuretics with improvement. Was ruled out for PE at OSH. Underwent R/LHC on 11/15 which showed triple vessel disease (pLAD 90%, occluded OM1, occluded RCA with possible collaterals from L system). A V-gram also revealed possible severe MR (? from papillary muscle impingement). TTE showed LVEDD 6 cm, HFrEF, no significant MR but a VERN was obtained which suggested severe MR (although may be moderate in severity). He was considered for CABG/MVR but was turned down for concern he would not benefit from this. Was asked to weigh in on management. Currently feels well. Denies CP/SOB. Denies orthopnea/PND. He does report having dyspnea with climbing stairs for several months. BP has been in 110-120 range.     PAST MEDICAL & SURGICAL HISTORY:  Diabetes mellitus  No significant past surgical history      FAMILY HISTORY:  Family history of thyroid disease (Mother)  Family history of Parkinson disease (Mother)  Family history of hypertension (Father)  Family history of diabetes mellitus in father (Father)  Family history of CABG (Father)      Home Medications:  metFORMIN 1000 mg oral tablet: 1 tab(s) orally 2 times a day (2018 18:08)      Medications:  aspirin enteric coated 81 milliGRAM(s) Oral daily  atorvastatin 80 milliGRAM(s) Oral at bedtime  captopril 12.5 milliGRAM(s) Oral every 8 hours  dextrose 40% Gel 15 Gram(s) Oral once PRN  dextrose 5%. 1000 milliLiter(s) IV Continuous <Continuous>  dextrose 50% Injectable 12.5 Gram(s) IV Push once  dextrose 50% Injectable 25 Gram(s) IV Push once  dextrose 50% Injectable 25 Gram(s) IV Push once  glucagon  Injectable 1 milliGRAM(s) IntraMuscular once PRN  influenza   Vaccine 0.5 milliLiter(s) IntraMuscular once  insulin lispro (HumaLOG) corrective regimen sliding scale   SubCutaneous at bedtime  insulin lispro (HumaLOG) corrective regimen sliding scale   SubCutaneous three times a day before meals  metoprolol tartrate 12.5 milliGRAM(s) Oral every 12 hours      Review of systems:  10 point review of systems completed and are negative unless noted in HPI    Vitals:  T(C): 36.7 (18 @ 17:00), Max: 36.9 (18 @ 07:00)  HR: 94 (18 @ 18:00) (60 - 108)  BP: 102/62 (18 @ 18:00) (80/52 - 113/70)  BP(mean): 70 (18 @ 18:00) (61 - 95)  RR: 20 (18 @ 18:00) (14 - 20)  SpO2: 96% (18 @ 18:00) (94% - 98%)    Daily     Daily Weight in k.2 (2018 05:00)      I&O's Summary    2018 07:  -  2018 07:00  --------------------------------------------------------  IN: 1191 mL / OUT: 2250 mL / NET: -1059 mL    2018 07:01  -  2018 18:58  --------------------------------------------------------  IN: 291 mL / OUT: 150 mL / NET: 141 mL        Physical Exam:  Appearance: No Acute Distress  HEENT: PERRL  Neck: no significant JVD  Cardiovascular: Normal S1 S2, No murmurs/rubs/gallops  Respiratory: Clear to auscultation bilaterally  Gastrointestinal: Soft, Non-tender	  Skin: No cyanosis	  Neurologic: Non-focal  Extremities: No LE edema  Psychiatry: A & O x 3, Mood & affect appropriate    Labs:                        14.6   6.4   )-----------( 163      ( 2018 03:14 )             43.2         139  |  102  |  22  ----------------------------<  149<H>  4.0   |  22  |  0.87    Ca    9.2      2018 03:14  Phos  3.4       Mg     2.1         TPro  6.4  /  Alb  3.8  /  TBili  0.9  /  DBili  x   /  AST  32  /  ALT  39  /  AlkPhos  50      PTT - ( 2018 09:38 )  PTT:74.5 sec    TELEMETRY:  no events    EKG:  NSR, RAD, septal Qs, good R waves inferior and lateral leads

## 2018-11-18 LAB
ALBUMIN SERPL ELPH-MCNC: 3.8 G/DL — SIGNIFICANT CHANGE UP (ref 3.3–5)
ALP SERPL-CCNC: 54 U/L — SIGNIFICANT CHANGE UP (ref 40–120)
ALT FLD-CCNC: 42 U/L — SIGNIFICANT CHANGE UP (ref 10–45)
ANION GAP SERPL CALC-SCNC: 13 MMOL/L — SIGNIFICANT CHANGE UP (ref 5–17)
AST SERPL-CCNC: 37 U/L — SIGNIFICANT CHANGE UP (ref 10–40)
BASOPHILS # BLD AUTO: 0.1 K/UL — SIGNIFICANT CHANGE UP (ref 0–0.2)
BASOPHILS NFR BLD AUTO: 1.3 % — SIGNIFICANT CHANGE UP (ref 0–2)
BILIRUB SERPL-MCNC: 0.8 MG/DL — SIGNIFICANT CHANGE UP (ref 0.2–1.2)
BUN SERPL-MCNC: 20 MG/DL — SIGNIFICANT CHANGE UP (ref 7–23)
CALCIUM SERPL-MCNC: 9.3 MG/DL — SIGNIFICANT CHANGE UP (ref 8.4–10.5)
CHLORIDE SERPL-SCNC: 105 MMOL/L — SIGNIFICANT CHANGE UP (ref 96–108)
CHOLEST SERPL-MCNC: 173 MG/DL — SIGNIFICANT CHANGE UP (ref 10–199)
CO2 SERPL-SCNC: 21 MMOL/L — LOW (ref 22–31)
CREAT SERPL-MCNC: 0.84 MG/DL — SIGNIFICANT CHANGE UP (ref 0.5–1.3)
EOSINOPHIL # BLD AUTO: 0.2 K/UL — SIGNIFICANT CHANGE UP (ref 0–0.5)
EOSINOPHIL NFR BLD AUTO: 3.5 % — SIGNIFICANT CHANGE UP (ref 0–6)
GLUCOSE BLDC GLUCOMTR-MCNC: 137 MG/DL — HIGH (ref 70–99)
GLUCOSE BLDC GLUCOMTR-MCNC: 145 MG/DL — HIGH (ref 70–99)
GLUCOSE BLDC GLUCOMTR-MCNC: 163 MG/DL — HIGH (ref 70–99)
GLUCOSE BLDC GLUCOMTR-MCNC: 171 MG/DL — HIGH (ref 70–99)
GLUCOSE SERPL-MCNC: 155 MG/DL — HIGH (ref 70–99)
HCT VFR BLD CALC: 43 % — SIGNIFICANT CHANGE UP (ref 39–50)
HDLC SERPL-MCNC: 39 MG/DL — LOW
HGB BLD-MCNC: 15.2 G/DL — SIGNIFICANT CHANGE UP (ref 13–17)
LIPID PNL WITH DIRECT LDL SERPL: 117 MG/DL — SIGNIFICANT CHANGE UP
LYMPHOCYTES # BLD AUTO: 1.5 K/UL — SIGNIFICANT CHANGE UP (ref 1–3.3)
LYMPHOCYTES # BLD AUTO: 25.1 % — SIGNIFICANT CHANGE UP (ref 13–44)
MAGNESIUM SERPL-MCNC: 2 MG/DL — SIGNIFICANT CHANGE UP (ref 1.6–2.6)
MCHC RBC-ENTMCNC: 30.9 PG — SIGNIFICANT CHANGE UP (ref 27–34)
MCHC RBC-ENTMCNC: 35.3 GM/DL — SIGNIFICANT CHANGE UP (ref 32–36)
MCV RBC AUTO: 87.5 FL — SIGNIFICANT CHANGE UP (ref 80–100)
MONOCYTES # BLD AUTO: 0.7 K/UL — SIGNIFICANT CHANGE UP (ref 0–0.9)
MONOCYTES NFR BLD AUTO: 11.1 % — SIGNIFICANT CHANGE UP (ref 2–14)
NEUTROPHILS # BLD AUTO: 3.5 K/UL — SIGNIFICANT CHANGE UP (ref 1.8–7.4)
NEUTROPHILS NFR BLD AUTO: 59.1 % — SIGNIFICANT CHANGE UP (ref 43–77)
PHOSPHATE SERPL-MCNC: 3.6 MG/DL — SIGNIFICANT CHANGE UP (ref 2.5–4.5)
PLATELET # BLD AUTO: 175 K/UL — SIGNIFICANT CHANGE UP (ref 150–400)
POTASSIUM SERPL-MCNC: 4.2 MMOL/L — SIGNIFICANT CHANGE UP (ref 3.5–5.3)
POTASSIUM SERPL-SCNC: 4.2 MMOL/L — SIGNIFICANT CHANGE UP (ref 3.5–5.3)
PROT SERPL-MCNC: 6.5 G/DL — SIGNIFICANT CHANGE UP (ref 6–8.3)
RBC # BLD: 4.91 M/UL — SIGNIFICANT CHANGE UP (ref 4.2–5.8)
RBC # FLD: 12 % — SIGNIFICANT CHANGE UP (ref 10.3–14.5)
SODIUM SERPL-SCNC: 139 MMOL/L — SIGNIFICANT CHANGE UP (ref 135–145)
TOTAL CHOLESTEROL/HDL RATIO MEASUREMENT: 4.4 RATIO — SIGNIFICANT CHANGE UP (ref 3.4–9.6)
TRIGL SERPL-MCNC: 83 MG/DL — SIGNIFICANT CHANGE UP (ref 10–149)
WBC # BLD: 6 K/UL — SIGNIFICANT CHANGE UP (ref 3.8–10.5)
WBC # FLD AUTO: 6 K/UL — SIGNIFICANT CHANGE UP (ref 3.8–10.5)

## 2018-11-18 PROCEDURE — 99233 SBSQ HOSP IP/OBS HIGH 50: CPT

## 2018-11-18 PROCEDURE — 93010 ELECTROCARDIOGRAM REPORT: CPT

## 2018-11-18 PROCEDURE — 99291 CRITICAL CARE FIRST HOUR: CPT

## 2018-11-18 RX ORDER — CAPTOPRIL 12.5 MG/1
25 TABLET ORAL EVERY 8 HOURS
Qty: 0 | Refills: 0 | Status: DISCONTINUED | OUTPATIENT
Start: 2018-11-18 | End: 2018-11-19

## 2018-11-18 RX ORDER — CHLORHEXIDINE GLUCONATE 213 G/1000ML
1 SOLUTION TOPICAL
Qty: 0 | Refills: 0 | Status: DISCONTINUED | OUTPATIENT
Start: 2018-11-18 | End: 2018-11-23

## 2018-11-18 RX ADMIN — CAPTOPRIL 25 MILLIGRAM(S): 12.5 TABLET ORAL at 21:48

## 2018-11-18 RX ADMIN — Medication 1: at 11:44

## 2018-11-18 RX ADMIN — CAPTOPRIL 25 MILLIGRAM(S): 12.5 TABLET ORAL at 15:51

## 2018-11-18 RX ADMIN — ATORVASTATIN CALCIUM 80 MILLIGRAM(S): 80 TABLET, FILM COATED ORAL at 21:47

## 2018-11-18 RX ADMIN — Medication 81 MILLIGRAM(S): at 09:16

## 2018-11-18 RX ADMIN — Medication 1: at 16:38

## 2018-11-18 RX ADMIN — CHLORHEXIDINE GLUCONATE 1 APPLICATION(S): 213 SOLUTION TOPICAL at 21:49

## 2018-11-18 RX ADMIN — CAPTOPRIL 12.5 MILLIGRAM(S): 12.5 TABLET ORAL at 05:51

## 2018-11-18 RX ADMIN — Medication 12.5 MILLIGRAM(S): at 21:49

## 2018-11-18 RX ADMIN — Medication 12.5 MILLIGRAM(S): at 09:16

## 2018-11-18 NOTE — PROGRESS NOTE ADULT - ASSESSMENT
57 y/o M w/ h/o DM (on oral meds) who presented with acute SOB and chest tightness found to have 3vessel CAD and new HFrEF (EF 15-20%, LVEDD 6 cm) with possible moderate-severe MR. Currently normotensive and euvolemic. Labs reviewed. Overall stage C HF, NYHA class III appears compensated and euvolemic.  - increase captopril to 25 mg q8h; hold for SBP <90  - hold on further diuretics as euvolemic; may require lasix 20 mg PO daily   - standing weights daily  - undergoing viability study to assess degree of viable myocardium; if significant viability, would consider CABG; if only anterior wall, can consider high risk PCI with Impella support  - continue metoprolol 12.5 mg q12; increase as tolerated  - CAD: on ASA 81 mg daily, lipitor 80 mg qhs  - discussed clinical course with patient and counseled on sodium/fluid restriction

## 2018-11-18 NOTE — CHART NOTE - NSCHARTNOTEFT_GEN_A_CORE
====================  CCU MIDNIGHT ROUNDS  ====================    HOME RAMIREZ  [id number]    ====================  SUMMARY: HPI:  59 y/o male with PMH of T2DM who presented to [facility] [place] with SOB and midsternal chest tightness. In the ED, D-Dimer was 681, lactate 6.9, BNP 1924 CT chest was negative for PE. CXR showed extensive regions of parenchymal consolidation and groundglass opacities to bilateral lung fields with small/moderate pleural effusions, lasix 40 mg IVP and duoNebs x1 were given and placed on BiPAP.  Echo also showed severe global dysfunction with elevated Trop 0.6, pt was loaded with  mg and Plavix 600 mg and transferred to [facility] for NSTEMI.      In ED, continued with BiPAP and pt was given lasix 40 mg x2 IVP with output of 1L and started on nitroglycerin gtt for elevated BP.  Transferred to CCU for further management. (14 Nov 2018 15:02)    ====================        ====================  NEW EVENTS:  Completed part I of viability study today. No complaints.   ====================        ====================  VITALS (Last 12 hrs):  ====================    T(C): 36.8 (11-18-18 @ 19:00), Max: 36.8 (11-18-18 @ 19:00)  HR: 76 (11-18-18 @ 21:00) (74 - 92)  BP: 101/65 (11-18-18 @ 21:00) (96/60 - 110/86)  BP(mean): 75 (11-18-18 @ 21:00) (71 - 92)  RR: 16 (11-18-18 @ 19:00) (15 - 22)  SpO2: 96% (11-18-18 @ 21:00) (93% - 97%)      TELEMETRY: NSR        I&O's Summary    17 Nov 2018 07:01  -  18 Nov 2018 07:00  --------------------------------------------------------  IN: 411 mL / OUT: 880 mL / NET: -469 mL    18 Nov 2018 07:01  -  18 Nov 2018 22:16  --------------------------------------------------------  IN: 340 mL / OUT: 1180 mL / NET: -840 mL        ====================  PLAN:  - ADHF: Appears euvolemic at this time. May need standing PO lasix. HF recs appreciated. Monitor standing daily weights.  - Severe MR: Continue Captopril and BB, up-titrate as tolerated. May require intervention.   - NSTEMI: found to have TVD. Continue ASA, statin, BB, and ACE. Hold second antiPLT agent for possible CABG. Pending viability study part II tomorrow. If significant viability, would consider CABG; if only anterior wall, can consider high risk PCI with Impella support  ====================    HEALTH ISSUES - PROBLEM Dx:  Mitral valve insufficiency, unspecified etiology: Mitral valve insufficiency, unspecified etiology  Coronary artery disease involving native heart with unstable angina pectoris, unspecified vessel or lesion type: Coronary artery disease involving native heart with unstable angina pectoris, unspecified vessel or lesion type  Diabetes mellitus: Diabetes mellitus  NSTEMI (non-ST elevated myocardial infarction): NSTEMI (non-ST elevated myocardial infarction)  Non-rheumatic mitral regurgitation: Non-rheumatic mitral regurgitation  Acute systolic heart failure: Acute systolic heart failure          Joy Rowley, U PA #[phone]

## 2018-11-18 NOTE — PROGRESS NOTE ADULT - PROBLEM SELECTOR PROBLEM 2
Non-rheumatic mitral regurgitation

## 2018-11-18 NOTE — PROGRESS NOTE ADULT - SUBJECTIVE AND OBJECTIVE BOX
Admission date:  CHIEF COMPLAINT:  HPI:  59 y/o male with PMH of T2DM who presented to [facility] with SOB and midsternal chest tightness. In the ED, D-Dimer was 681, lactate 6.9, BNP 1924 CT chest was negative for PE. CXR showed extensive regions of parenchymal consolidation and groundglass opacities to bilateral lung fields with small/moderate pleural effusions, lasix 40 mg IVP and duoNebs x1 were given and placed on BiPAP.  Echo also showed severe global dysfunction with elevated Trop 0.6, pt was loaded with  mg and Plavix 600 mg and transferred to [facility] for NSTEMI.      In ED, continued with BiPAP and pt was given lasix 40 mg x2 IVP with output of 1L and started on nitroglycerin gtt for elevated BP.  Transferred to CCU for further management. (2018 15:02)    INTERVAL HISTORY:    REVIEW OF SYSTEMS: Denies xxxxxx; all others negative    MEDICATIONS  (STANDING):  aspirin enteric coated 81 milliGRAM(s) Oral daily  atorvastatin 80 milliGRAM(s) Oral at bedtime  captopril 12.5 milliGRAM(s) Oral every 8 hours  dextrose 5%. 1000 milliLiter(s) (50 mL/Hr) IV Continuous <Continuous>  dextrose 50% Injectable 12.5 Gram(s) IV Push once  dextrose 50% Injectable 25 Gram(s) IV Push once  dextrose 50% Injectable 25 Gram(s) IV Push once  influenza   Vaccine 0.5 milliLiter(s) IntraMuscular once  insulin lispro (HumaLOG) corrective regimen sliding scale   SubCutaneous at bedtime  insulin lispro (HumaLOG) corrective regimen sliding scale   SubCutaneous three times a day before meals  metoprolol tartrate 12.5 milliGRAM(s) Oral every 12 hours    MEDICATIONS  (PRN):  dextrose 40% Gel 15 Gram(s) Oral once PRN Blood Glucose LESS THAN 70 milliGRAM(s)/deciliter  glucagon  Injectable 1 milliGRAM(s) IntraMuscular once PRN Glucose LESS THAN 70 milligrams/deciliter      Objective:  ICU Vital Signs Last 24 Hrs  T(C): 36.5 (2018 05:00), Max: 36.9 (2018 19:00)  T(F): 97.7 (2018 05:00), Max: 98.4 (2018 19:00)  HR: 72 (2018 06:00) (58 - 94)  BP: 97/66 (2018 06:00) (82/52 - 112/70)  BP(mean): 76 (2018 06:00) (59 - 96)  ABP: --  ABP(mean): --  RR: 16 (2018 06:00) (16 - 20)  SpO2: 96% (2018 06:00) (93% - 97%)      Adult Advanced Hemodynamics Last 24 Hrs  CVP(mm Hg): --  CVP(cm H2O): --  CO: --  CI: --  PA: --  PA(mean): --  PCWP: --  SVR: --  SVRI: --  PVR: --  PVRI: --       @ 07:01  -   @ 07:00  --------------------------------------------------------  IN: 411 mL / OUT: 880 mL / NET: -469 mL      Daily     Daily Weight in k.3 (2018 05:00)    PHYSICAL EXAM:      Constitutional:    HEENT:    Respiratory:    Cardiovascular:    Gastrointestinal:    Genitourinary:    Extremities:    Vascular:    Neurological:    Skin:      TELEMETRY:     EKG:     IMAGING:    Labs:                          15.2   6.0   )-----------( 175      ( 2018 04:18 )             43.0         139  |  105  |  20  ----------------------------<  155<H>  4.2   |  21<L>  |  0.84    Ca    9.3      2018 04:18  Phos  3.6     18  Mg     2.0         TPro  6.5  /  Alb  3.8  /  TBili  0.8  /  DBili  x   /  AST  37  /  ALT  42  /  AlkPhos  54  18    LIVER FUNCTIONS - ( 2018 04:18 )  Alb: 3.8 g/dL / Pro: 6.5 g/dL / ALK PHOS: 54 U/L / ALT: 42 U/L / AST: 37 U/L / GGT: x           PTT - ( 2018 09:38 )  PTT:74.5 sec        HEALTH ISSUES - PROBLEM Dx:  Mitral valve insufficiency, unspecified etiology: Mitral valve insufficiency, unspecified etiology  Coronary artery disease involving native heart with unstable angina pectoris, unspecified vessel or lesion type: Coronary artery disease involving native heart with unstable angina pectoris, unspecified vessel or lesion type  Diabetes mellitus: Diabetes mellitus  NSTEMI (non-ST elevated myocardial infarction): NSTEMI (non-ST elevated myocardial infarction)  Non-rheumatic mitral regurgitation: Non-rheumatic mitral regurgitation  Acute systolic heart failure: Acute systolic heart failure Admission date:  CHIEF COMPLAINT:  HPI:  59 y/o male with PMH of T2DM who presented to [facility] with SOB and midsternal chest tightness. In the ED, D-Dimer was 681, lactate 6.9, BNP 1924 CT chest was negative for PE. CXR showed extensive regions of parenchymal consolidation and groundglass opacities to bilateral lung fields with small/moderate pleural effusions, lasix 40 mg IVP and duoNebs x1 were given and placed on BiPAP.  Echo also showed severe global dysfunction with elevated Trop 0.6, pt was loaded with  mg and Plavix 600 mg and transferred to [facility] for NSTEMI.      In ED, continued with BiPAP and pt was given lasix 40 mg x2 IVP with output of 1L and started on nitroglycerin gtt for elevated BP.  Transferred to CCU for further management. (2018 15:02)    INTERVAL HISTORY: Patient seen and examined, denies CP, dyspnea, PND, orthopnea, palpitations and able to lay flat.     REVIEW OF SYSTEMS:    CONSTITUTIONAL: No weakness, fevers or chills  EYES/ENT: No visual changes;  No vertigo or throat pain   NECK: No pain or stiffness  RESPIRATORY: No cough, wheezing, hemoptysis; No shortness of breath  CARDIOVASCULAR: No chest pain or palpitations  GASTROINTESTINAL: No abdominal or epigastric pain. No nausea, vomiting, or hematemesis; No diarrhea or constipation. No melena or hematochezia.  GENITOURINARY: No dysuria, frequency or hematuria  NEUROLOGICAL: No numbness or weakness  SKIN: No itching, rashes      MEDICATIONS  (STANDING):  aspirin enteric coated 81 milliGRAM(s) Oral daily  atorvastatin 80 milliGRAM(s) Oral at bedtime  captopril 12.5 milliGRAM(s) Oral every 8 hours  dextrose 5%. 1000 milliLiter(s) (50 mL/Hr) IV Continuous <Continuous>  dextrose 50% Injectable 12.5 Gram(s) IV Push once  dextrose 50% Injectable 25 Gram(s) IV Push once  dextrose 50% Injectable 25 Gram(s) IV Push once  influenza   Vaccine 0.5 milliLiter(s) IntraMuscular once  insulin lispro (HumaLOG) corrective regimen sliding scale   SubCutaneous at bedtime  insulin lispro (HumaLOG) corrective regimen sliding scale   SubCutaneous three times a day before meals  metoprolol tartrate 12.5 milliGRAM(s) Oral every 12 hours    MEDICATIONS  (PRN):  dextrose 40% Gel 15 Gram(s) Oral once PRN Blood Glucose LESS THAN 70 milliGRAM(s)/deciliter  glucagon  Injectable 1 milliGRAM(s) IntraMuscular once PRN Glucose LESS THAN 70 milligrams/deciliter      Objective:  ICU Vital Signs Last 24 Hrs  T(C): 36.5 (2018 05:00), Max: 36.9 (2018 19:00)  T(F): 97.7 (2018 05:00), Max: 98.4 (2018 19:00)  HR: 72 (2018 06:00) (58 - 94)  BP: 97/66 (2018 06:00) (82/52 - 112/70)  BP(mean): 76 (2018 06:00) (59 - 96)  ABP: --  ABP(mean): --  RR: 16 (2018 06:00) (16 - 20)  SpO2: 96% (2018 06:00) (93% - 97%)      Adult Advanced Hemodynamics Last 24 Hrs  CVP(mm Hg): --  CVP(cm H2O): --  CO: --  CI: --  PA: --  PA(mean): --  PCWP: --  SVR: --  SVRI: --  PVR: --  PVRI: --       @ 07:01  -   @ 07:00  --------------------------------------------------------  IN: 411 mL / OUT: 880 mL / NET: -469 mL      Daily     Daily Weight in k.3 (2018 05:00)    PHYSICAL EXAM:  General: WN/WD NAD  Neurology: Awake, nonfocal, [place] x 4  Eyes: Scleras clear, PERRLA/ EOMI, Gross vision intact  ENT:Gross hearing intact, grossly patent pharynx, no stridor  Neck: Neck supple, trachea midline, No JVD,   Respiratory: BS diminished at bases B/L, No wheezing, rales, rhonchi  CV: RRR, S1S2, no murmurs, rubs or gallops  Abdominal: Soft, NT, ND +BS,   Extremities: No edema, + peripheral pulses  Skin: No Rashes, Hematoma, Ecchymosis  Lymphatic: No Neck, axilla, groin LAD  Psych: Oriented x 3, normal affect          TELEMETRY:     EKG:     IMAGING:    Labs:                          15.2   6.0   )-----------( 175      ( 2018 04:18 )             43.0         139  |  105  |  20  ----------------------------<  155<H>  4.2   |  21<L>  |  0.84    Ca    9.3      2018 04:18  Phos  3.6       Mg     2.0         TPro  6.5  /  Alb  3.8  /  TBili  0.8  /  DBili  x   /  AST  37  /  ALT  42  /  AlkPhos  54      LIVER FUNCTIONS - ( 2018 04:18 )  Alb: 3.8 g/dL / Pro: 6.5 g/dL / ALK PHOS: 54 U/L / ALT: 42 U/L / AST: 37 U/L / GGT: x           PTT - ( 2018 09:38 )  PTT:74.5 sec        HEALTH ISSUES - PROBLEM Dx:  Mitral valve insufficiency, unspecified etiology: Mitral valve insufficiency, unspecified etiology  Coronary artery disease involving native heart with unstable angina pectoris, unspecified vessel or lesion type: Coronary artery disease involving native heart with unstable angina pectoris, unspecified vessel or lesion type  Diabetes mellitus: Diabetes mellitus  NSTEMI (non-ST elevated myocardial infarction): NSTEMI (non-ST elevated myocardial infarction)  Non-rheumatic mitral regurgitation: Non-rheumatic mitral regurgitation  Acute systolic heart failure: Acute systolic heart failure

## 2018-11-18 NOTE — PROGRESS NOTE ADULT - SUBJECTIVE AND OBJECTIVE BOX
Interval History:  feels well  tolerating captopril 12.5 mg q8h    Medications:  aspirin enteric coated 81 milliGRAM(s) Oral daily  atorvastatin 80 milliGRAM(s) Oral at bedtime  captopril 25 milliGRAM(s) Oral every 8 hours  dextrose 40% Gel 15 Gram(s) Oral once PRN  dextrose 5%. 1000 milliLiter(s) IV Continuous <Continuous>  dextrose 50% Injectable 12.5 Gram(s) IV Push once  dextrose 50% Injectable 25 Gram(s) IV Push once  dextrose 50% Injectable 25 Gram(s) IV Push once  glucagon  Injectable 1 milliGRAM(s) IntraMuscular once PRN  influenza   Vaccine 0.5 milliLiter(s) IntraMuscular once  insulin lispro (HumaLOG) corrective regimen sliding scale   SubCutaneous at bedtime  insulin lispro (HumaLOG) corrective regimen sliding scale   SubCutaneous three times a day before meals  metoprolol tartrate 12.5 milliGRAM(s) Oral every 12 hours    Vitals:  T(C): 36.5 (18 @ 15:00), Max: 36.9 (18 @ 19:00)  HR: 78 (18 @ 16:00) (58 - 94)  BP: 107/67 (18 @ 16:00) (84/61 - 115/73)  BP(mean): 81 (18 @ 16:00) (59 - 96)  RR: 21 (18 @ 16:00) (15 - 22)  SpO2: 96% (18 @ 16:00) (93% - 97%)    Daily     Daily Weight in k.4 (2018 11:00)    I&O's Summary    2018 07:  -  2018 07:00  --------------------------------------------------------  IN: 411 mL / OUT: 880 mL / NET: -469 mL    2018 07:  -  2018 16:57  --------------------------------------------------------  IN: 0 mL / OUT: 350 mL / NET: -350 mL    Physical Exam:  Appearance: No Acute Distress  HEENT: PERRL  Neck: No JVD  Cardiovascular: Normal S1 S2, No murmurs/rubs/gallops  Respiratory: Clear to auscultation bilaterally  Gastrointestinal: Soft, Non-tender	  Skin: No cyanosis	  Neurologic: Non-focal  Extremities: No LE edema  Psychiatry: A & O x 3, Mood & affect appropriate    Labs:                        15.2   6.0   )-----------( 175      ( 2018 04:18 )             43.0     11-18    139  |  105  |  20  ----------------------------<  155<H>  4.2   |  21<L>  |  0.84    Ca    9.3      2018 04:18  Phos  3.6     11-18  Mg     2.0     -18    TPro  6.5  /  Alb  3.8  /  TBili  0.8  /  DBili  x   /  AST  37  /  ALT  42  /  AlkPhos  54  11-18    PTT - ( 2018 09:38 )  PTT:74.5 sec    TELEMETRY:  no events

## 2018-11-18 NOTE — PROGRESS NOTE ADULT - PROBLEM SELECTOR PLAN 3
- CE peaked at Trop H of 1428 yesterday. No chest pain  - Continue ASA, hold antiplatelet. continue lipitor and heparin gtt for 48 hours. Continue lopressor.

## 2018-11-18 NOTE — CHART NOTE - NSCHARTNOTEFT_GEN_A_CORE
====================  CCU MIDNIGHT ROUNDS  ====================    HOME RAMIREZ  [id number]    ====================  SUMMARY: HPI:  57 y/o male with PMH of T2DM who presented to [facility] with SOB and midsternal chest tightness. In the ED, D-Dimer was 681, lactate 6.9, BNP 1924 CT chest was negative for PE. CXR showed extensive regions of parenchymal consolidation and groundglass opacities to bilateral lung fields with small/moderate pleural effusions, lasix 40 mg IVP and duoNebs x1 were given and placed on BiPAP.  Echo also showed severe global dysfunction with elevated Trop 0.6, pt was loaded with  mg and Plavix 600 mg and transferred to [facility] for NSTEMI.      In ED, continued with BiPAP and pt was given lasix 40 mg x2 IVP with output of 1L and started on nitroglycerin gtt for elevated BP.  Transferred to CCU for further management. (14 Nov 2018 15:02)    ====================        ====================  NEW EVENTS:  ====================        ====================  VITALS (Last 12 hrs):  ====================    T(C): 36.9 (11-17-18 @ 19:00), Max: 36.9 (11-17-18 @ 19:00)  HR: 60 (11-18-18 @ 00:00) (60 - 94)  BP: 84/61 (11-18-18 @ 00:00) (82/52 - 111/70)  BP(mean): 66 (11-18-18 @ 00:00) (59 - 86)  ABP: --  ABP(mean): --  RR: 18 (11-17-18 @ 23:00) (17 - 20)  SpO2: 95% (11-18-18 @ 00:00) (93% - 97%)  Wt(kg): --  CVP(mm Hg): --  CO: --  CI: --  PA: --  PA(mean): --  PA(direct): --  PCWP: --  SVR: --    TELEMETRY:        *BLOOD GAS/ARTERIAL/MIXED/VENOUS  *LACTATE    I&O's Summary    16 Nov 2018 07:01  -  17 Nov 2018 07:00  --------------------------------------------------------  IN: 1191 mL / OUT: 2250 mL / NET: -1059 mL    17 Nov 2018 07:01  -  18 Nov 2018 00:27  --------------------------------------------------------  IN: 291 mL / OUT: 250 mL / NET: 41 mL        ====================  PLAN:  ====================    HEALTH ISSUES - PROBLEM Dx:  Mitral valve insufficiency, unspecified etiology: Mitral valve insufficiency, unspecified etiology  Coronary artery disease involving native heart with unstable angina pectoris, unspecified vessel or lesion type: Coronary artery disease involving native heart with unstable angina pectoris, unspecified vessel or lesion type  Diabetes mellitus: Diabetes mellitus  NSTEMI (non-ST elevated myocardial infarction): NSTEMI (non-ST elevated myocardial infarction)  Non-rheumatic mitral regurgitation: Non-rheumatic mitral regurgitation  Acute systolic heart failure: Acute systolic heart failure        Joy Rowley, [facility] #[id number] ====================  CCU MIDNIGHT ROUNDS  ====================    HOME RAMIREZ  [id number]    ====================  SUMMARY: HPI:  59 y/o male with PMH of T2DM who presented to [facility] with SOB and midsternal chest tightness. In the ED, D-Dimer was 681, lactate 6.9, BNP 1924 CT chest was negative for PE. CXR showed extensive regions of parenchymal consolidation and groundglass opacities to bilateral lung fields with small/moderate pleural effusions, lasix 40 mg IVP and duoNebs x1 were given and placed on BiPAP.  Echo also showed severe global dysfunction with elevated Trop 0.6, pt was loaded with  mg and Plavix 600 mg and transferred to [facility] for NSTEMI.      In ED, continued with BiPAP and pt was given lasix 40 mg x2 IVP with output of 1L and started on nitroglycerin gtt for elevated BP.  Transferred to CCU for further management. (14 Nov 2018 15:02)    ====================        ====================  NEW EVENTS:  No events.  ====================        ====================  VITALS (Last 12 hrs):  ====================    T(C): 36.9 (11-17-18 @ 19:00), Max: 36.9 (11-17-18 @ 19:00)  HR: 60 (11-18-18 @ 00:00) (60 - 94)  BP: 84/61 (11-18-18 @ 00:00) (82/52 - 111/70)  BP(mean): 66 (11-18-18 @ 00:00) (59 - 86)  RR: 18 (11-17-18 @ 23:00) (17 - 20)  SpO2: 95% (11-18-18 @ 00:00) (93% - 97%)      TELEMETRY: sinus        I&O's Summary    16 Nov 2018 07:01  -  17 Nov 2018 07:00  --------------------------------------------------------  IN: 1191 mL / OUT: 2250 mL / NET: -1059 mL    17 Nov 2018 07:01  -  18 Nov 2018 00:27  --------------------------------------------------------  IN: 291 mL / OUT: 250 mL / NET: 41 mL        ====================  PLAN:  - ADHF: Appears euvolemic at this time. Consider starting standing PO lasix tomorrow. HF recs appreciated.   - Severe MR: Will need repeat echo to reassess his MR. Continue medical management. CTS consult.  - NSTEMI: found to have TVD. Continue ASA, statin, BB, and ACE. Hold second antiPLT agent for possible CABG. Pending viability study monday.   ====================    HEALTH ISSUES - PROBLEM Dx:  Mitral valve insufficiency, unspecified etiology: Mitral valve insufficiency, unspecified etiology  Coronary artery disease involving native heart with unstable angina pectoris, unspecified vessel or lesion type: Coronary artery disease involving native heart with unstable angina pectoris, unspecified vessel or lesion type  Diabetes mellitus: Diabetes mellitus  NSTEMI (non-ST elevated myocardial infarction): NSTEMI (non-ST elevated myocardial infarction)  Non-rheumatic mitral regurgitation: Non-rheumatic mitral regurgitation  Acute systolic heart failure: Acute systolic heart failure        Joy Rowley [facility] PA #[id number]

## 2018-11-19 LAB
ANION GAP SERPL CALC-SCNC: 11 MMOL/L — SIGNIFICANT CHANGE UP (ref 5–17)
BASOPHILS # BLD AUTO: 0 K/UL — SIGNIFICANT CHANGE UP (ref 0–0.2)
BASOPHILS NFR BLD AUTO: 0.4 % — SIGNIFICANT CHANGE UP (ref 0–2)
BUN SERPL-MCNC: 20 MG/DL — SIGNIFICANT CHANGE UP (ref 7–23)
CALCIUM SERPL-MCNC: 9.6 MG/DL — SIGNIFICANT CHANGE UP (ref 8.4–10.5)
CHLORIDE SERPL-SCNC: 103 MMOL/L — SIGNIFICANT CHANGE UP (ref 96–108)
CO2 SERPL-SCNC: 23 MMOL/L — SIGNIFICANT CHANGE UP (ref 22–31)
CREAT SERPL-MCNC: 0.84 MG/DL — SIGNIFICANT CHANGE UP (ref 0.5–1.3)
EOSINOPHIL # BLD AUTO: 0.2 K/UL — SIGNIFICANT CHANGE UP (ref 0–0.5)
EOSINOPHIL NFR BLD AUTO: 3.1 % — SIGNIFICANT CHANGE UP (ref 0–6)
GLUCOSE BLDC GLUCOMTR-MCNC: 107 MG/DL — HIGH (ref 70–99)
GLUCOSE BLDC GLUCOMTR-MCNC: 119 MG/DL — HIGH (ref 70–99)
GLUCOSE BLDC GLUCOMTR-MCNC: 142 MG/DL — HIGH (ref 70–99)
GLUCOSE BLDC GLUCOMTR-MCNC: 163 MG/DL — HIGH (ref 70–99)
GLUCOSE SERPL-MCNC: 151 MG/DL — HIGH (ref 70–99)
HCT VFR BLD CALC: 41.6 % — SIGNIFICANT CHANGE UP (ref 39–50)
HGB BLD-MCNC: 14.6 G/DL — SIGNIFICANT CHANGE UP (ref 13–17)
LYMPHOCYTES # BLD AUTO: 1.7 K/UL — SIGNIFICANT CHANGE UP (ref 1–3.3)
LYMPHOCYTES # BLD AUTO: 26.4 % — SIGNIFICANT CHANGE UP (ref 13–44)
MAGNESIUM SERPL-MCNC: 2 MG/DL — SIGNIFICANT CHANGE UP (ref 1.6–2.6)
MCHC RBC-ENTMCNC: 30.6 PG — SIGNIFICANT CHANGE UP (ref 27–34)
MCHC RBC-ENTMCNC: 35.1 GM/DL — SIGNIFICANT CHANGE UP (ref 32–36)
MCV RBC AUTO: 87.2 FL — SIGNIFICANT CHANGE UP (ref 80–100)
MONOCYTES # BLD AUTO: 0.7 K/UL — SIGNIFICANT CHANGE UP (ref 0–0.9)
MONOCYTES NFR BLD AUTO: 11.3 % — SIGNIFICANT CHANGE UP (ref 2–14)
NEUTROPHILS # BLD AUTO: 3.8 K/UL — SIGNIFICANT CHANGE UP (ref 1.8–7.4)
NEUTROPHILS NFR BLD AUTO: 58.9 % — SIGNIFICANT CHANGE UP (ref 43–77)
PHOSPHATE SERPL-MCNC: 3.6 MG/DL — SIGNIFICANT CHANGE UP (ref 2.5–4.5)
PLATELET # BLD AUTO: 182 K/UL — SIGNIFICANT CHANGE UP (ref 150–400)
POTASSIUM SERPL-MCNC: 4.1 MMOL/L — SIGNIFICANT CHANGE UP (ref 3.5–5.3)
POTASSIUM SERPL-SCNC: 4.1 MMOL/L — SIGNIFICANT CHANGE UP (ref 3.5–5.3)
RBC # BLD: 4.77 M/UL — SIGNIFICANT CHANGE UP (ref 4.2–5.8)
RBC # FLD: 12.5 % — SIGNIFICANT CHANGE UP (ref 10.3–14.5)
SODIUM SERPL-SCNC: 137 MMOL/L — SIGNIFICANT CHANGE UP (ref 135–145)
WBC # BLD: 6.5 K/UL — SIGNIFICANT CHANGE UP (ref 3.8–10.5)
WBC # FLD AUTO: 6.5 K/UL — SIGNIFICANT CHANGE UP (ref 3.8–10.5)

## 2018-11-19 PROCEDURE — 78452 HT MUSCLE IMAGE SPECT MULT: CPT | Mod: 26

## 2018-11-19 PROCEDURE — 99233 SBSQ HOSP IP/OBS HIGH 50: CPT | Mod: GC

## 2018-11-19 PROCEDURE — 93010 ELECTROCARDIOGRAM REPORT: CPT

## 2018-11-19 RX ORDER — CLOPIDOGREL BISULFATE 75 MG/1
600 TABLET, FILM COATED ORAL ONCE
Qty: 0 | Refills: 0 | Status: COMPLETED | OUTPATIENT
Start: 2018-11-19 | End: 2018-11-19

## 2018-11-19 RX ORDER — HEPARIN SODIUM 5000 [USP'U]/ML
5000 INJECTION INTRAVENOUS; SUBCUTANEOUS EVERY 12 HOURS
Qty: 0 | Refills: 0 | Status: DISCONTINUED | OUTPATIENT
Start: 2018-11-19 | End: 2018-11-23

## 2018-11-19 RX ORDER — METOPROLOL TARTRATE 50 MG
12.5 TABLET ORAL EVERY 12 HOURS
Qty: 0 | Refills: 0 | Status: DISCONTINUED | OUTPATIENT
Start: 2018-11-19 | End: 2018-11-19

## 2018-11-19 RX ORDER — METOPROLOL TARTRATE 50 MG
25 TABLET ORAL EVERY 12 HOURS
Qty: 0 | Refills: 0 | Status: DISCONTINUED | OUTPATIENT
Start: 2018-11-19 | End: 2018-11-19

## 2018-11-19 RX ORDER — METOPROLOL TARTRATE 50 MG
25 TABLET ORAL
Qty: 0 | Refills: 0 | Status: DISCONTINUED | OUTPATIENT
Start: 2018-11-19 | End: 2018-11-19

## 2018-11-19 RX ORDER — CLOPIDOGREL BISULFATE 75 MG/1
75 TABLET, FILM COATED ORAL DAILY
Qty: 0 | Refills: 0 | Status: DISCONTINUED | OUTPATIENT
Start: 2018-11-19 | End: 2018-11-19

## 2018-11-19 RX ORDER — METOPROLOL TARTRATE 50 MG
25 TABLET ORAL EVERY 12 HOURS
Qty: 0 | Refills: 0 | Status: DISCONTINUED | OUTPATIENT
Start: 2018-11-19 | End: 2018-11-21

## 2018-11-19 RX ORDER — CAPTOPRIL 12.5 MG/1
25 TABLET ORAL EVERY 8 HOURS
Qty: 0 | Refills: 0 | Status: DISCONTINUED | OUTPATIENT
Start: 2018-11-19 | End: 2018-11-21

## 2018-11-19 RX ORDER — CLOPIDOGREL BISULFATE 75 MG/1
75 TABLET, FILM COATED ORAL DAILY
Qty: 0 | Refills: 0 | Status: DISCONTINUED | OUTPATIENT
Start: 2018-11-20 | End: 2018-11-23

## 2018-11-19 RX ADMIN — Medication 1: at 10:43

## 2018-11-19 RX ADMIN — CAPTOPRIL 25 MILLIGRAM(S): 12.5 TABLET ORAL at 21:40

## 2018-11-19 RX ADMIN — Medication 25 MILLIGRAM(S): at 17:03

## 2018-11-19 RX ADMIN — Medication 81 MILLIGRAM(S): at 12:10

## 2018-11-19 RX ADMIN — CHLORHEXIDINE GLUCONATE 1 APPLICATION(S): 213 SOLUTION TOPICAL at 21:42

## 2018-11-19 RX ADMIN — CAPTOPRIL 25 MILLIGRAM(S): 12.5 TABLET ORAL at 13:49

## 2018-11-19 RX ADMIN — HEPARIN SODIUM 5000 UNIT(S): 5000 INJECTION INTRAVENOUS; SUBCUTANEOUS at 05:47

## 2018-11-19 RX ADMIN — CLOPIDOGREL BISULFATE 600 MILLIGRAM(S): 75 TABLET, FILM COATED ORAL at 17:03

## 2018-11-19 RX ADMIN — ATORVASTATIN CALCIUM 80 MILLIGRAM(S): 80 TABLET, FILM COATED ORAL at 21:40

## 2018-11-19 RX ADMIN — HEPARIN SODIUM 5000 UNIT(S): 5000 INJECTION INTRAVENOUS; SUBCUTANEOUS at 17:03

## 2018-11-19 RX ADMIN — Medication 12.5 MILLIGRAM(S): at 12:10

## 2018-11-19 RX ADMIN — CAPTOPRIL 25 MILLIGRAM(S): 12.5 TABLET ORAL at 05:46

## 2018-11-19 NOTE — PROGRESS NOTE ADULT - ASSESSMENT
59 y/o M w/ h/o DM (on oral meds) who presented with acute SOB and chest tightness found to have 3vessel CAD and new HFrEF (EF 15-20%, LVEDD 6 cm) with possible moderate-severe MR. Currently normotensive and euvolemic. Labs reviewed. Overall stage C HF, NYHA class III appears compensated and euvolemic.  - continue captopril to 25 mg q8h; hold for SBP <90  - can initiate lisinopril 5 mg tomorrow  - hold on further diuretics as euvolemic; may require lasix 20 mg PO daily   - standing weights daily  - would discuss with CT surgery, if not a CABG candidate will need high-risk PCI  - increase metoprolol to 25 mg q12; increase as tolerated  - CAD: on ASA 81 mg daily, lipitor 80 mg qhs  - discussed clinical course with patient and counseled on sodium/fluid restriction    Kal Cardona MD 57 y/o M w/ h/o DM (on oral meds) who presented with acute SOB and chest tightness found to have 3vessel CAD and new HFrEF (EF 15-20%, LVEDD 6 cm) with possible moderate-severe MR. Currently normotensive and euvolemic. Labs reviewed. Overall stage C HF, NYHA class III appears compensated and euvolemic.  - continue captopril to 25 mg q8h; hold for SBP <90  - can initiate lisinopril tomorrow  - hold on further diuretics as euvolemic; may require lasix 20 mg PO daily   - standing weights daily  - would discuss with CT surgery, if not a CABG candidate will need high-risk PCI  - increase metoprolol to 25 mg q12; increase as tolerated  - CAD: on ASA 81 mg daily, lipitor 80 mg qhs  - discussed clinical course with patient and counseled on sodium/fluid restriction    Kal Cardona MD

## 2018-11-19 NOTE — PROGRESS NOTE ADULT - SUBJECTIVE AND OBJECTIVE BOX
HPI:  59 y/o male with PMH of T2DM who presented to [facility] with SOB and midsternal chest tightness. In the ED, D-Dimer was 681, lactate 6.9, BNP 1924 CT chest was negative for PE. CXR showed extensive regions of parenchymal consolidation and groundglass opacities to bilateral lung fields with small/moderate pleural effusions, lasix 40 mg IVP and duoNebs x1 were given and placed on BiPAP.  Echo also showed severe global dysfunction with elevated Trop 0.6, pt was loaded with  mg and Plavix 600 mg and transferred to [facility] for NSTEMI.      In ED, continued with BiPAP and pt was given lasix 40 mg x2 IVP with output of 1L and started on nitroglycerin gtt for elevated BP.  Transferred to CCU for further management. (14 Nov 2018 15:02)    INTERVAL HISTORY: Patient seen and examined, denies CP, dyspnea, PND, orthopnea, palpitations and able to lay flat.     REVIEW OF SYSTEMS:    CONSTITUTIONAL: No weakness, fevers or chills  EYES/ENT: No visual changes;  No vertigo or throat pain   NECK: No pain or stiffness  RESPIRATORY: No cough, wheezing, hemoptysis; No shortness of breath  CARDIOVASCULAR: No chest pain or palpitations  GASTROINTESTINAL: No abdominal or epigastric pain. No nausea, vomiting, or hematemesis; No diarrhea or constipation. No melena or hematochezia.  GENITOURINARY: No dysuria, frequency or hematuria  NEUROLOGICAL: No numbness or weakness  SKIN: No itching, rashes    OBJECTIVE:  VITAL SIGNS:  ICU Vital Signs Last 24 Hrs  T(C): 36.8 (19 Nov 2018 06:00), Max: 36.8 (18 Nov 2018 19:00)  T(F): 98.2 (19 Nov 2018 06:00), Max: 98.3 (18 Nov 2018 19:00)  HR: 64 (19 Nov 2018 07:00) (56 - 92)  BP: 95/57 (19 Nov 2018 07:00) (81/44 - 119/80)  BP(mean): 64 (19 Nov 2018 07:00) (61 - 96)  ABP: --  ABP(mean): --  RR: 16 (19 Nov 2018 07:00) (11 - 22)  SpO2: 98% (19 Nov 2018 07:00) (93% - 98%)        11-18 @ 07:01  -  11-19 @ 07:00  --------------------------------------------------------  IN: 460 mL / OUT: 1780 mL / NET: -1320 mL      CAPILLARY BLOOD GLUCOSE      POCT Blood Glucose.: 137 mg/dL (18 Nov 2018 21:51)      PHYSICAL EXAM:  Gen:   HEENT: NC/AT; PERRL, anicteric sclera  Neck: supple, no JVD  Resp: clear to ausculation B/L; no wheezes, rales or rhonchi  Cardiovasc: S1S2 normal; RRR; no murmurs, rubs or gallops  GI: soft, nondistended, nontender; +BS  Extr: warm, well-perfused, PT/DP pulses 2+ B/L; no LE edema  Skin: normal color and turgor  Neuro:     LABS:                        14.6   6.5   )-----------( 182      ( 19 Nov 2018 04:08 )             41.6     11-19    137  |  103  |  20  ----------------------------<  151<H>  4.1   |  23  |  0.84    Ca    9.6      19 Nov 2018 04:08  Phos  3.6     11-19  Mg     2.0     11-19    TPro  6.5  /  Alb  3.8  /  TBili  0.8  /  DBili  x   /  AST  37  /  ALT  42  /  AlkPhos  54  11-18    LIVER FUNCTIONS - ( 18 Nov 2018 04:18 )  Alb: 3.8 g/dL / Pro: 6.5 g/dL / ALK PHOS: 54 U/L / ALT: 42 U/L / AST: 37 U/L / GGT: x           PTT - ( 17 Nov 2018 09:38 )  PTT:74.5 sec      RADIOLOGY & ADDITIONAL TESTS:       MEDICATIONS:  aspirin enteric coated 81 milliGRAM(s) Oral daily  atorvastatin 80 milliGRAM(s) Oral at bedtime  captopril 25 milliGRAM(s) Oral every 8 hours  chlorhexidine 4% Liquid 1 Application(s) Topical <User Schedule>  dextrose 40% Gel 15 Gram(s) Oral once PRN  dextrose 5%. 1000 milliLiter(s) IV Continuous <Continuous>  dextrose 50% Injectable 12.5 Gram(s) IV Push once  dextrose 50% Injectable 25 Gram(s) IV Push once  dextrose 50% Injectable 25 Gram(s) IV Push once  glucagon  Injectable 1 milliGRAM(s) IntraMuscular once PRN  heparin  Injectable 5000 Unit(s) SubCutaneous every 12 hours  influenza   Vaccine 0.5 milliLiter(s) IntraMuscular once  insulin lispro (HumaLOG) corrective regimen sliding scale   SubCutaneous at bedtime  insulin lispro (HumaLOG) corrective regimen sliding scale   SubCutaneous three times a day before meals  metoprolol tartrate 12.5 milliGRAM(s) Oral every 12 hours      ALLERGIES:  No Known Allergies

## 2018-11-19 NOTE — PROGRESS NOTE ADULT - SUBJECTIVE AND OBJECTIVE BOX
Cardiology Progress Note    Interval events: No acute events overnight. No chest pain. Went for myocardial viability study.    Wt 89.3 kg down from 90.4 kg.    Medications:  aspirin enteric coated 81 milliGRAM(s) Oral daily  atorvastatin 80 milliGRAM(s) Oral at bedtime  captopril 25 milliGRAM(s) Oral every 8 hours  chlorhexidine 4% Liquid 1 Application(s) Topical <User Schedule>  dextrose 40% Gel 15 Gram(s) Oral once PRN  dextrose 5%. 1000 milliLiter(s) IV Continuous <Continuous>  dextrose 50% Injectable 12.5 Gram(s) IV Push once  dextrose 50% Injectable 25 Gram(s) IV Push once  dextrose 50% Injectable 25 Gram(s) IV Push once  glucagon  Injectable 1 milliGRAM(s) IntraMuscular once PRN  heparin  Injectable 5000 Unit(s) SubCutaneous every 12 hours  influenza   Vaccine 0.5 milliLiter(s) IntraMuscular once  insulin lispro (HumaLOG) corrective regimen sliding scale   SubCutaneous at bedtime  insulin lispro (HumaLOG) corrective regimen sliding scale   SubCutaneous three times a day before meals  metoprolol tartrate 25 milliGRAM(s) Oral every 12 hours    Review of Systems:  Constitutional: [ ] Fever [ ] Chills [ ] Fatigue [ ] Weight change   HEENT: [ ] Blurred vision [ ] Eye Pain [ ] Headache [ ] Runny nose [ ] Sore Throat   Respiratory: [ ] Cough [ ] Wheezing [ ] Shortness of breath  Cardiovascular: [ ] Chest Pain [ ] Palpitations [ ] ARAIZA [ ] PND [ ] Orthopnea  Gastrointestinal: [ ] Abdominal Pain [ ] Diarrhea [ ] Constipation [ ] Hemorrhoids [ ] Nausea [ ] Vomiting  Genitourinary: [ ] Nocturia [ ] Dysuria [ ] Incontinence  Extremities: [ ] Swelling [ ] Joint Pain  Neurologic: [ ] Focal deficit [ ] Paresthesias [ ] Syncope  Lymphatic: [ ] Swelling [ ] Lymphadenopathy   Skin: [ ] Rash [ ] Ecchymoses [ ] Wounds [ ] Lesions  Psychiatry: [ ] Depression [ ] Suicidal/Homicidal Ideation [ ] Anxiety [ ] Sleep Disturbances  [x] 10 point review of systems is otherwise negative except as mentioned above            [ ]Unable to obtain    Vitals:  T(C): 36.8 (18 @ 16:00), Max: 36.8 (18 @ 19:00)  HR: 70 (18 @ 18:00) (56 - 88)  BP: 110/79 (18 @ 18:00) (81/44 - 119/80)  BP(mean): 90 (18 @ 18:00) (61 - 96)  RR: 20 (18 @ 18:00) (11 - 20)  SpO2: 98% (18 @ 18:00) (94% - 98%)  Wt(kg): --  Daily     Daily Weight in k.3 (2018 08:01)  I&O's Summary    2018 07:01  -  2018 07:00  --------------------------------------------------------  IN: 460 mL / OUT: 1780 mL / NET: -1320 mL    2018 07:01  -  2018 18:25  --------------------------------------------------------  IN: 590 mL / OUT: 875 mL / NET: -285 mL        Physical Exam:  NAD  PERRL, EOMI  Normal oral muscosa NC/AT  S1, S2, RRR, No m/r/g appreciated, No edema, no elevation in JVP  Clear to auscultation bilaterally  Soft, Non-tender, Non-distended, BS+  No clubbing, No joint deformity   Non-focal  No lymphadenopathy  AAOx3, Mood & affect appropriate  No rashes, No ecchymoses, No cyanosis    Labs:                        14.6   6.5   )-----------( 182      ( 2018 04:08 )             41.6         137  |  103  |  20  ----------------------------<  151<H>  4.1   |  23  |  0.84    Ca    9.6      2018 04:08  Phos  3.6       Mg     2.0         TPro  6.5  /  Alb  3.8  /  TBili  0.8  /  DBili  x   /  AST  37  /  ALT  42  /  AlkPhos  54  11-18    New results/imaging:  Cardiac Viability  IMPRESSIONS:Abnormal Study  * The left ventricle was enlarged.  * The anterior, anteroseptal, anterolateral, apical, and  mid to distal inferoseptal walls demonstrate mostly  preserved perfusion on rest imaging suggestive of viable  myocardium.  * There is a moderate to severe defect in the distal  inferior and distal inferolateral walls that is mostly  fixed on delay imaging suggestive of mild to moderate  viability.  * There is a small, severe defect in the basal  inferoseptum that demonstrates improved perfsuion on delay  imaging suggestive of mild to moderate residual viability.  * There are severe defects in the basal to mid inferior  and basal to mid inferolateral walls that are mostly fixed  on delay imaging suggestive of infarct with minimal  residual viability.  * Rest gated wall motion analysis was performed (LVEF = 12  %;LVEDV = 366 ml.) revealing akinesis of the basal to mid  inferior, basal to mid inferolateral, and basal  inferoseptal walls and hypokinesis of the remaining  segments.  *** No previous Nuclear/Stress exam.

## 2018-11-19 NOTE — PROGRESS NOTE ADULT - ASSESSMENT
Assessment and Plan:   · Assessment	  59 yo M w/ T2DM who p/w NSTEMI c/b AdHF and MR, now with HFrEF (EF 15-20%, LVEDD 6 cm).     Problem/Plan - 1:  ·  Problem: Acute systolic heart failure.  Plan: - Pt appears euvolemic at this time w/ clear breath sounds and no edema.  - Currently urine output 1780 mL / NET: -1320 mL  -Continue captopril to 25 mg q8h; hold for SBP <90.   - Hold on further diuretics as euvolemic; may require lasix 20 mg PO daily  - Undergoing viability study to assess degree of viable myocardium; if significant viability, would consider CABG; if only anterior wall, can consider high risk PCI with Impella support.  - continue metoprolol 12.5 mg q12; increase as tolerated     Problem/Plan - 2:  ·  Problem: Non-rheumatic mitral regurgitation.  Plan: - 11/16 VERN:   - CTS following for potential TVD w/ or w/o MVR, TTE showed mild MR.      Problem/Plan - 3:  ·  Problem: NSTEMI (non-ST elevated myocardial infarction).  Plan: - CE peaked at Trop H of 1458. No chest pain  - Continue ASA, hold antiplatelet. continue lipitor. Continue lopressor.   - Second part of viability today (11/19/18).       Problem/Plan - 4:  ·  Problem: Diabetes mellitus.  Plan: - Continue ISS.       - continue metoprolol 12.5 mg q12; increase as tolerated  - CAD: on ASA 81 mg daily, lipitor 80 mg qhs

## 2018-11-20 LAB
ALBUMIN SERPL ELPH-MCNC: 3.8 G/DL — SIGNIFICANT CHANGE UP (ref 3.3–5)
ALBUMIN SERPL ELPH-MCNC: 3.8 G/DL — SIGNIFICANT CHANGE UP (ref 3.3–5)
ALP SERPL-CCNC: 54 U/L — SIGNIFICANT CHANGE UP (ref 40–120)
ALP SERPL-CCNC: 58 U/L — SIGNIFICANT CHANGE UP (ref 40–120)
ALT FLD-CCNC: 47 U/L — HIGH (ref 10–45)
ALT FLD-CCNC: 56 U/L — HIGH (ref 10–45)
ANION GAP SERPL CALC-SCNC: 14 MMOL/L — SIGNIFICANT CHANGE UP (ref 5–17)
ANION GAP SERPL CALC-SCNC: 14 MMOL/L — SIGNIFICANT CHANGE UP (ref 5–17)
APTT BLD: 29.8 SEC — SIGNIFICANT CHANGE UP (ref 27.5–36.3)
AST SERPL-CCNC: 28 U/L — SIGNIFICANT CHANGE UP (ref 10–40)
AST SERPL-CCNC: 39 U/L — SIGNIFICANT CHANGE UP (ref 10–40)
BASOPHILS # BLD AUTO: 0 K/UL — SIGNIFICANT CHANGE UP (ref 0–0.2)
BASOPHILS NFR BLD AUTO: 0.2 % — SIGNIFICANT CHANGE UP (ref 0–2)
BILIRUB SERPL-MCNC: 0.5 MG/DL — SIGNIFICANT CHANGE UP (ref 0.2–1.2)
BILIRUB SERPL-MCNC: 0.8 MG/DL — SIGNIFICANT CHANGE UP (ref 0.2–1.2)
BUN SERPL-MCNC: 18 MG/DL — SIGNIFICANT CHANGE UP (ref 7–23)
BUN SERPL-MCNC: 23 MG/DL — SIGNIFICANT CHANGE UP (ref 7–23)
CALCIUM SERPL-MCNC: 9.1 MG/DL — SIGNIFICANT CHANGE UP (ref 8.4–10.5)
CALCIUM SERPL-MCNC: 9.4 MG/DL — SIGNIFICANT CHANGE UP (ref 8.4–10.5)
CHLORIDE SERPL-SCNC: 103 MMOL/L — SIGNIFICANT CHANGE UP (ref 96–108)
CHLORIDE SERPL-SCNC: 103 MMOL/L — SIGNIFICANT CHANGE UP (ref 96–108)
CO2 SERPL-SCNC: 18 MMOL/L — LOW (ref 22–31)
CO2 SERPL-SCNC: 20 MMOL/L — LOW (ref 22–31)
CREAT SERPL-MCNC: 0.83 MG/DL — SIGNIFICANT CHANGE UP (ref 0.5–1.3)
CREAT SERPL-MCNC: 0.97 MG/DL — SIGNIFICANT CHANGE UP (ref 0.5–1.3)
EOSINOPHIL # BLD AUTO: 0.2 K/UL — SIGNIFICANT CHANGE UP (ref 0–0.5)
EOSINOPHIL NFR BLD AUTO: 3.5 % — SIGNIFICANT CHANGE UP (ref 0–6)
GLUCOSE BLDC GLUCOMTR-MCNC: 139 MG/DL — HIGH (ref 70–99)
GLUCOSE BLDC GLUCOMTR-MCNC: 144 MG/DL — HIGH (ref 70–99)
GLUCOSE BLDC GLUCOMTR-MCNC: 145 MG/DL — HIGH (ref 70–99)
GLUCOSE BLDC GLUCOMTR-MCNC: 164 MG/DL — HIGH (ref 70–99)
GLUCOSE SERPL-MCNC: 139 MG/DL — HIGH (ref 70–99)
GLUCOSE SERPL-MCNC: 146 MG/DL — HIGH (ref 70–99)
HCT VFR BLD CALC: 43.8 % — SIGNIFICANT CHANGE UP (ref 39–50)
HGB BLD-MCNC: 14.5 G/DL — SIGNIFICANT CHANGE UP (ref 13–17)
INR BLD: 1.18 RATIO — HIGH (ref 0.88–1.16)
LYMPHOCYTES # BLD AUTO: 1.5 K/UL — SIGNIFICANT CHANGE UP (ref 1–3.3)
LYMPHOCYTES # BLD AUTO: 26.9 % — SIGNIFICANT CHANGE UP (ref 13–44)
MAGNESIUM SERPL-MCNC: 2 MG/DL — SIGNIFICANT CHANGE UP (ref 1.6–2.6)
MAGNESIUM SERPL-MCNC: 2.1 MG/DL — SIGNIFICANT CHANGE UP (ref 1.6–2.6)
MCHC RBC-ENTMCNC: 28.8 PG — SIGNIFICANT CHANGE UP (ref 27–34)
MCHC RBC-ENTMCNC: 33.2 GM/DL — SIGNIFICANT CHANGE UP (ref 32–36)
MCV RBC AUTO: 86.9 FL — SIGNIFICANT CHANGE UP (ref 80–100)
MONOCYTES # BLD AUTO: 0.7 K/UL — SIGNIFICANT CHANGE UP (ref 0–0.9)
MONOCYTES NFR BLD AUTO: 11.5 % — SIGNIFICANT CHANGE UP (ref 2–14)
NEUTROPHILS # BLD AUTO: 3.3 K/UL — SIGNIFICANT CHANGE UP (ref 1.8–7.4)
NEUTROPHILS NFR BLD AUTO: 58 % — SIGNIFICANT CHANGE UP (ref 43–77)
PHOSPHATE SERPL-MCNC: 3.5 MG/DL — SIGNIFICANT CHANGE UP (ref 2.5–4.5)
PHOSPHATE SERPL-MCNC: 3.8 MG/DL — SIGNIFICANT CHANGE UP (ref 2.5–4.5)
PLATELET # BLD AUTO: 180 K/UL — SIGNIFICANT CHANGE UP (ref 150–400)
POTASSIUM SERPL-MCNC: 4.4 MMOL/L — SIGNIFICANT CHANGE UP (ref 3.5–5.3)
POTASSIUM SERPL-MCNC: 4.4 MMOL/L — SIGNIFICANT CHANGE UP (ref 3.5–5.3)
POTASSIUM SERPL-SCNC: 4.4 MMOL/L — SIGNIFICANT CHANGE UP (ref 3.5–5.3)
POTASSIUM SERPL-SCNC: 4.4 MMOL/L — SIGNIFICANT CHANGE UP (ref 3.5–5.3)
PROT SERPL-MCNC: 6.4 G/DL — SIGNIFICANT CHANGE UP (ref 6–8.3)
PROT SERPL-MCNC: 6.6 G/DL — SIGNIFICANT CHANGE UP (ref 6–8.3)
PROTHROM AB SERPL-ACNC: 13.5 SEC — HIGH (ref 10–12.9)
RBC # BLD: 5.04 M/UL — SIGNIFICANT CHANGE UP (ref 4.2–5.8)
RBC # FLD: 12 % — SIGNIFICANT CHANGE UP (ref 10.3–14.5)
SODIUM SERPL-SCNC: 135 MMOL/L — SIGNIFICANT CHANGE UP (ref 135–145)
SODIUM SERPL-SCNC: 137 MMOL/L — SIGNIFICANT CHANGE UP (ref 135–145)
WBC # BLD: 5.7 K/UL — SIGNIFICANT CHANGE UP (ref 3.8–10.5)
WBC # FLD AUTO: 5.7 K/UL — SIGNIFICANT CHANGE UP (ref 3.8–10.5)

## 2018-11-20 PROCEDURE — 93321 DOPPLER ECHO F-UP/LMTD STD: CPT | Mod: 26

## 2018-11-20 PROCEDURE — 99152 MOD SED SAME PHYS/QHP 5/>YRS: CPT | Mod: GC

## 2018-11-20 PROCEDURE — 92941 PRQ TRLML REVSC TOT OCCL AMI: CPT | Mod: LD,GC

## 2018-11-20 PROCEDURE — 93308 TTE F-UP OR LMTD: CPT | Mod: 26

## 2018-11-20 PROCEDURE — 76937 US GUIDE VASCULAR ACCESS: CPT | Mod: 26,GC

## 2018-11-20 PROCEDURE — 74176 CT ABD & PELVIS W/O CONTRAST: CPT | Mod: 26

## 2018-11-20 PROCEDURE — 93010 ELECTROCARDIOGRAM REPORT: CPT

## 2018-11-20 PROCEDURE — 93010 ELECTROCARDIOGRAM REPORT: CPT | Mod: 77

## 2018-11-20 PROCEDURE — 33990 INSJ PERQ VAD L HRT ARTERIAL: CPT | Mod: GC

## 2018-11-20 PROCEDURE — 99233 SBSQ HOSP IP/OBS HIGH 50: CPT | Mod: GC

## 2018-11-20 RX ORDER — NOREPINEPHRINE BITARTRATE/D5W 8 MG/250ML
0.05 PLASTIC BAG, INJECTION (ML) INTRAVENOUS
Qty: 8 | Refills: 0 | Status: DISCONTINUED | OUTPATIENT
Start: 2018-11-20 | End: 2018-11-20

## 2018-11-20 RX ORDER — SODIUM CHLORIDE 9 MG/ML
200 INJECTION INTRAMUSCULAR; INTRAVENOUS; SUBCUTANEOUS ONCE
Qty: 0 | Refills: 0 | Status: COMPLETED | OUTPATIENT
Start: 2018-11-20 | End: 2018-11-20

## 2018-11-20 RX ADMIN — CHLORHEXIDINE GLUCONATE 1 APPLICATION(S): 213 SOLUTION TOPICAL at 22:53

## 2018-11-20 RX ADMIN — CLOPIDOGREL BISULFATE 75 MILLIGRAM(S): 75 TABLET, FILM COATED ORAL at 11:20

## 2018-11-20 RX ADMIN — Medication 1: at 11:37

## 2018-11-20 RX ADMIN — Medication 25 MILLIGRAM(S): at 21:04

## 2018-11-20 RX ADMIN — CAPTOPRIL 25 MILLIGRAM(S): 12.5 TABLET ORAL at 22:54

## 2018-11-20 RX ADMIN — CAPTOPRIL 25 MILLIGRAM(S): 12.5 TABLET ORAL at 13:05

## 2018-11-20 RX ADMIN — CAPTOPRIL 25 MILLIGRAM(S): 12.5 TABLET ORAL at 07:39

## 2018-11-20 RX ADMIN — ATORVASTATIN CALCIUM 80 MILLIGRAM(S): 80 TABLET, FILM COATED ORAL at 21:04

## 2018-11-20 RX ADMIN — Medication 81 MILLIGRAM(S): at 11:20

## 2018-11-20 RX ADMIN — Medication 25 MILLIGRAM(S): at 05:06

## 2018-11-20 RX ADMIN — SODIUM CHLORIDE 200 MILLILITER(S): 9 INJECTION INTRAMUSCULAR; INTRAVENOUS; SUBCUTANEOUS at 16:05

## 2018-11-20 RX ADMIN — HEPARIN SODIUM 5000 UNIT(S): 5000 INJECTION INTRAVENOUS; SUBCUTANEOUS at 05:06

## 2018-11-20 NOTE — PROGRESS NOTE ADULT - ASSESSMENT
57 y/o M w/ h/o DM (on oral meds) who presented with acute SOB and chest tightness found to have 3vessel CAD and new HFrEF (EF 15-20%, LVEDD 6 cm) with possible moderate-severe MR. Currently normotensive and euvolemic. Labs reviewed. Overall stage C HF, NYHA class III appears compensated and euvolemic.  - continue captopril to 25 mg q8h; hold for SBP <90  - will defer initiation of lisinopril given hypotension overnight  - hold on further diuretics as euvolemic; may require lasix 20 mg PO daily   - standing weights daily  - plan for high-risk PCI today  - increase metoprolol to 25 mg q12; increase as tolerated  - CAD: on ASA 81 mg daily, lipitor 80 mg qhs  - discussed clinical course with patient and counseled on sodium/fluid restriction    Kal Cardona MD

## 2018-11-20 NOTE — PROGRESS NOTE ADULT - SUBJECTIVE AND OBJECTIVE BOX
Cardiology Progress Note    Interval events: No acute events overnight. Captopril doses held for hypotension SBP 70s/80s. Patient without complaints. No chest pain.    Tele: sinus, PVCs    Medications:  aspirin enteric coated 81 milliGRAM(s) Oral daily  atorvastatin 80 milliGRAM(s) Oral at bedtime  captopril 25 milliGRAM(s) Oral every 8 hours  chlorhexidine 4% Liquid 1 Application(s) Topical <User Schedule>  clopidogrel Tablet 75 milliGRAM(s) Oral daily  dextrose 40% Gel 15 Gram(s) Oral once PRN  dextrose 5%. 1000 milliLiter(s) IV Continuous <Continuous>  dextrose 50% Injectable 12.5 Gram(s) IV Push once  dextrose 50% Injectable 25 Gram(s) IV Push once  dextrose 50% Injectable 25 Gram(s) IV Push once  glucagon  Injectable 1 milliGRAM(s) IntraMuscular once PRN  heparin  Injectable 5000 Unit(s) SubCutaneous every 12 hours  influenza   Vaccine 0.5 milliLiter(s) IntraMuscular once  insulin lispro (HumaLOG) corrective regimen sliding scale   SubCutaneous at bedtime  insulin lispro (HumaLOG) corrective regimen sliding scale   SubCutaneous three times a day before meals  metoprolol tartrate 25 milliGRAM(s) Oral every 12 hours      Review of Systems:  Constitutional: [ ] Fever [ ] Chills [ ] Fatigue [ ] Weight change   HEENT: [ ] Blurred vision [ ] Eye Pain [ ] Headache [ ] Runny nose [ ] Sore Throat   Respiratory: [ ] Cough [ ] Wheezing [ ] Shortness of breath  Cardiovascular: [ ] Chest Pain [ ] Palpitations [ ] ARAIZA [ ] PND [ ] Orthopnea  Gastrointestinal: [ ] Abdominal Pain [ ] Diarrhea [ ] Constipation [ ] Hemorrhoids [ ] Nausea [ ] Vomiting  Genitourinary: [ ] Nocturia [ ] Dysuria [ ] Incontinence  Extremities: [ ] Swelling [ ] Joint Pain  Neurologic: [ ] Focal deficit [ ] Paresthesias [ ] Syncope  Lymphatic: [ ] Swelling [ ] Lymphadenopathy   Skin: [ ] Rash [ ] Ecchymoses [ ] Wounds [ ] Lesions  Psychiatry: [ ] Depression [ ] Suicidal/Homicidal Ideation [ ] Anxiety [ ] Sleep Disturbances  [x] 10 point review of systems is otherwise negative except as mentioned above            [ ]Unable to obtain    Vitals:  T(C): 36.7 (18 @ 04:00), Max: 36.8 (18 @ 16:00)  HR: 60 (18 @ 07:00) (52 - 82)  BP: 100/69 (18 @ 07:00) (71/49 - 112/91)  BP(mean): 78 (18 @ 07:00) (55 - 103)  RR: 16 (18 @ 07:00) (15 - 20)  SpO2: 96% (18 @ 07:00) (95% - 98%)  Wt(kg): --  Daily     Daily Weight in k.3 (2018 06:00)  I&O's Summary    2018 07:01  -  2018 07:00  --------------------------------------------------------  IN: 830 mL / OUT: 2175 mL / NET: -1345 mL        Physical Exam:  NAD  PERRL, EOMI  Normal oral muscosa NC/AT  S1, S2, RRR, No m/r/g appreciated, No edema, no elevation in JVP  Clear to auscultation bilaterally  Soft, Non-tender, Non-distended, BS+  No clubbing, No joint deformity   Non-focal  No lymphadenopathy  AAOx3, Mood & affect appropriate  No rashes, No ecchymoses, No cyanosis    Labs:                        14.5   5.7   )-----------( 180      ( 2018 05:05 )             43.8     -    137  |  103  |  18  ----------------------------<  139<H>  4.4   |  20<L>  |  0.83    Ca    9.4      2018 05:05  Phos  3.5       Mg     2.1         TPro  6.6  /  Alb  3.8  /  TBili  0.8  /  DBili  x   /  AST  39  /  ALT  56<H>  /  AlkPhos  58      PT/INR - ( 2018 05:05 )   PT: 13.5 sec;   INR: 1.18 ratio         PTT - ( 2018 05:05 )  PTT:29.8 sec    New results/imaging:

## 2018-11-20 NOTE — PROGRESS NOTE ADULT - ASSESSMENT
59 yo M w/ T2DM (on oral meds) who p/w NSTEMI found to have 3vessel CAD and new HFrEF (EF 15-20%, LVEDD 6 cm) with possible moderate-severe MR. Currently normotensive and euvolemic. Labs reviewed. Overall stage C HF, NYHA class III appears compensated and euvolemic.    Neuro  -No active issues. Normal mental status.    Cardiac  ·  Problem:  Acute systolic heart failure.  Plan: - Pt appears euvolemic at this time w/ clear breath sounds and no edema.  - continue captopril to 25 mg q8h; hold for SBP <90  - can initiate lisinopril tomorrow if adequate blood pressures.  - hold on further diuretics as euvolemic; may require lasix 20 mg PO daily   - standing weights daily  - CT surgery states that patient is not a CABG candidate, and  will need high-risk PCI; to be performed today.   - increase metoprolol to 25 mg q12; increase as tolerated  - CAD: on ASA 81 mg daily, Lipitor 80 mg qhs  - discussed clinical course with patient and counseled on sodium/fluid restriction    -Underwent viability study today not amenable to CABG, will pursue PCI with potential temporary support, additional he could benefit from mitral clip if MR does not improve  - continue metoprolol 12.5 mg q12; increase as tolerated  ·  Problem: Non-rheumatic mitral regurgitation.    - CTS following for potential TVD w/ or w/o MVR, TTE showed mild MR.      Problem/Plan - 3:  ·  Problem: NSTEMI (non-ST elevated myocardial infarction).  Plan: - CE peaked at Trop H of 1458. No chest pain  - Continue ASA, hold antiplatelet. continue lipitor. Continue lopressor.     Pulm:  -No active issues    ID:   -No active issues.    Endo:   ·  Problem: Diabetes mellitus.  Plan: - Continue ISS.     DVT PPx: Continue Heparin sq.

## 2018-11-20 NOTE — CHART NOTE - NSCHARTNOTEFT_GEN_A_CORE
====================  CCU MIDNIGHT ROUNDS  ====================    HOME RAMIREZ  [id number]  Patient is a 58y old  Male who presents with a chief complaint of SOB and chest tightness (19 Nov 2018 18:25)    ====================  SUMMARY: 59 y/o male with PMH of T2DM who presented to [facility] with SOB and midsternal chest tightness. In the ED, D-Dimer was 681, lactate 6.9, BNP 1924 CT chest was negative for PE. CXR showed extensive regions of parenchymal consolidation and groundglass opacities to bilateral lung fields with small/moderate pleural effusions, lasix 40 mg IVP and duoNebs x1 were given and placed on BiPAP.  Echo also showed severe global dysfunction with elevated Trop 0.6, pt was loaded with  mg and Plavix 600 mg and transferred to [facility] for NSTEMI.    ====================    ====================  NEW EVENTS: Cardiac viability study performed with several areas of myocardium that appear to be viable. Patient pending CTS eval, if not intervened - - will need to undergo high risk PCI.   ====================  ====================  VITALS (Last 12 hrs):  ====================    T(C): 36.7 (11-19-18 @ 20:00), Max: 36.8 (11-19-18 @ 16:00)  T(F): 98.1 (11-19-18 @ 20:00), Max: 98.3 (11-19-18 @ 16:00)  HR: 52 (11-20-18 @ 00:00) (52 - 82)  BP: 86/59 (11-20-18 @ 00:00) (71/49 - 110/79)  BP(mean): 66 (11-20-18 @ 00:00) (55 - 91)  ABP: --  ABP(mean): --  RR: 17 (11-20-18 @ 00:00) (15 - 20)  SpO2: 96% (11-20-18 @ 00:00) (95% - 98%)  Wt(kg): --  CVP(mm Hg): --  CVP(cm H2O): --  CO: --  CI: --  PA: --  PA(mean): --  PCWP: --  SVR: --  PVR: --    I&O's Summary    18 Nov 2018 07:01  -  19 Nov 2018 07:00  --------------------------------------------------------  IN: 460 mL / OUT: 1780 mL / NET: -1320 mL    19 Nov 2018 07:01  -  20 Nov 2018 00:59  --------------------------------------------------------  IN: 590 mL / OUT: 1125 mL / NET: -535 mL    ====================  NEW LABS:  IMPRESSIONS:Abnormal Study  * The left ventricle was enlarged.  * The anterior, anteroseptal, anterolateral, apical, and  mid to distal inferoseptal walls demonstrate mostly  preserved perfusion on rest imaging suggestive of viable  myocardium.  * There is a moderate to severe defect in the distal  inferior and distal inferolateral walls that is mostly  fixed on delay imaging suggestive of mild to moderate  viability.  * There is a small, severe defect in the basal  inferoseptum that demonstrates improved perfsuion on delay  imaging suggestive of mild to moderate residual viability.  * There are severe defects in the basal to mid inferior  and basal to mid inferolateral walls that are mostly fixed  on delay imaging suggestive of infarct with minimal  residual viability.  * Rest gated wall motion analysis was performed (LVEF = 12  %;LVEDV = 366 ml.) revealing akinesis of the basal to mid  inferior, basal to mid inferolateral, and basal  inferoseptal walls and hypokinesis of the remaining  segments.  *** No previous Nuclear/Stress exam.    < end of copied text >      ====================                          14.6   6.5   )-----------( 182      ( 19 Nov 2018 04:08 )             41.6     11-19    137  |  103  |  20  ----------------------------<  151<H>  4.1   |  23  |  0.84    Ca    9.6      19 Nov 2018 04:08  Phos  3.6     11-19  Mg     2.0     11-19    TPro  6.5  /  Alb  3.8  /  TBili  0.8  /  DBili  x   /  AST  37  /  ALT  42  /  AlkPhos  54  11-18    ====================  Plan:   ADHF: Appears euvolemic at this time. As per HF: can consider starting 20 PO lasix QD if needed   - Severe MR: Continue Captopril and BB with plan to switch to Lisinopril for AM dose   - NSTEMI: found to have TVD. Continue ASA, statin, BB, and ACE. Hold second antiPLT agent for possible CABG. Pending viability study part II tomorrow. Viability study showing several areas of viable myocardium - - pending CTS evaluation, with contingency plan for high risk PCI     ====================    Minnie Juares  PGY2 ====================  CCU MIDNIGHT ROUNDS  ====================    HOME RAMIREZ  [id number]  Patient is a 58y old  Male who presents with a chief complaint of SOB and chest tightness (19 Nov 2018 18:25)    ====================  SUMMARY: 57 y/o male with PMH of T2DM who presented to [facility] with SOB and midsternal chest tightness. In the ED, D-Dimer was 681, lactate 6.9, BNP 1924 CT chest was negative for PE. CXR showed extensive regions of parenchymal consolidation and groundglass opacities to bilateral lung fields with small/moderate pleural effusions, lasix 40 mg IVP and duoNebs x1 were given and placed on BiPAP.  Echo also showed severe global dysfunction with elevated Trop 0.6, pt was loaded with  mg and Plavix 600 mg and transferred to [facility] for NSTEMI.    ====================    ====================  NEW EVENTS: Cardiac viability study performed with several areas of myocardium that appear to be viable. Patient pending CTS eval, if not intervened - - will need to undergo high risk PCI.   ====================  ====================  VITALS (Last 12 hrs):  ====================    T(C): 36.7 (11-19-18 @ 20:00), Max: 36.8 (11-19-18 @ 16:00)  T(F): 98.1 (11-19-18 @ 20:00), Max: 98.3 (11-19-18 @ 16:00)  HR: 52 (11-20-18 @ 00:00) (52 - 82)  BP: 86/59 (11-20-18 @ 00:00) (71/49 - 110/79)  BP(mean): 66 (11-20-18 @ 00:00) (55 - 91)  ABP: --  ABP(mean): --  RR: 17 (11-20-18 @ 00:00) (15 - 20)  SpO2: 96% (11-20-18 @ 00:00) (95% - 98%)  Wt(kg): --  CVP(mm Hg): --  CVP(cm H2O): --  CO: --  CI: --  PA: --  PA(mean): --  PCWP: --  SVR: --  PVR: --    I&O's Summary    18 Nov 2018 07:01  -  19 Nov 2018 07:00  --------------------------------------------------------  IN: 460 mL / OUT: 1780 mL / NET: -1320 mL    19 Nov 2018 07:01  -  20 Nov 2018 00:59  --------------------------------------------------------  IN: 590 mL / OUT: 1125 mL / NET: -535 mL    ====================  NEW LABS:  IMPRESSIONS:Abnormal Study  * The left ventricle was enlarged.  * The anterior, anteroseptal, anterolateral, apical, and  mid to distal inferoseptal walls demonstrate mostly  preserved perfusion on rest imaging suggestive of viable  myocardium.  * There is a moderate to severe defect in the distal  inferior and distal inferolateral walls that is mostly  fixed on delay imaging suggestive of mild to moderate  viability.  * There is a small, severe defect in the basal  inferoseptum that demonstrates improved perfsuion on delay  imaging suggestive of mild to moderate residual viability.  * There are severe defects in the basal to mid inferior  and basal to mid inferolateral walls that are mostly fixed  on delay imaging suggestive of infarct with minimal  residual viability.  * Rest gated wall motion analysis was performed (LVEF = 12  %;LVEDV = 366 ml.) revealing akinesis of the basal to mid  inferior, basal to mid inferolateral, and basal  inferoseptal walls and hypokinesis of the remaining  segments.  *** No previous Nuclear/Stress exam.    < end of copied text >      ====================                          14.6   6.5   )-----------( 182      ( 19 Nov 2018 04:08 )             41.6     11-19    137  |  103  |  20  ----------------------------<  151<H>  4.1   |  23  |  0.84    Ca    9.6      19 Nov 2018 04:08  Phos  3.6     11-19  Mg     2.0     11-19    TPro  6.5  /  Alb  3.8  /  TBili  0.8  /  DBili  x   /  AST  37  /  ALT  42  /  AlkPhos  54  11-18    ====================  Plan:   ADHF: Appears euvolemic at this time. As per HF: can consider starting 20 PO lasix QD if needed   - Severe MR: Continue Captopril and BB with plan to switch to Lisinopril for AM dose   - NSTEMI: found to have TVD. Continue ASA, plavix, statin, BB, and ACE. Viability study showing several areas of viable myocardium - - pending CTS evaluation, with contingency plan for high risk PCI     ====================    Minnie Juares  PGY2 ====================  CCU MIDNIGHT ROUNDS  ====================    HOME RAMIREZ  [id number]  Patient is a 58y old  Male who presents with a chief complaint of SOB and chest tightness (19 Nov 2018 18:25)    ====================  SUMMARY: 57 y/o male with PMH of T2DM who presented to [facility] with SOB and midsternal chest tightness. In the ED, D-Dimer was 681, lactate 6.9, BNP 1924 CT chest was negative for PE. CXR showed extensive regions of parenchymal consolidation and groundglass opacities to bilateral lung fields with small/moderate pleural effusions, lasix 40 mg IVP and duoNebs x1 were given and placed on BiPAP.  Echo also showed severe global dysfunction with elevated Trop 0.6, pt was loaded with  mg and Plavix 600 mg and transferred to [facility] for NSTEMI.    ====================    ====================  NEW EVENTS: Cardiac viability study performed with several areas of myocardium that appear to be viable. Patient pending CTS eval, if not intervened - - will need to undergo high risk PCI.   ====================  ====================  VITALS (Last 12 hrs):  ====================    T(C): 36.7 (11-19-18 @ 20:00), Max: 36.8 (11-19-18 @ 16:00)  T(F): 98.1 (11-19-18 @ 20:00), Max: 98.3 (11-19-18 @ 16:00)  HR: 52 (11-20-18 @ 00:00) (52 - 82)  BP: 86/59 (11-20-18 @ 00:00) (71/49 - 110/79)  BP(mean): 66 (11-20-18 @ 00:00) (55 - 91)  ABP: --  ABP(mean): --  RR: 17 (11-20-18 @ 00:00) (15 - 20)  SpO2: 96% (11-20-18 @ 00:00) (95% - 98%)  Wt(kg): --  CVP(mm Hg): --  CVP(cm H2O): --  CO: --  CI: --  PA: --  PA(mean): --  PCWP: --  SVR: --  PVR: --    I&O's Summary    18 Nov 2018 07:01  -  19 Nov 2018 07:00  --------------------------------------------------------  IN: 460 mL / OUT: 1780 mL / NET: -1320 mL    19 Nov 2018 07:01  -  20 Nov 2018 00:59  --------------------------------------------------------  IN: 590 mL / OUT: 1125 mL / NET: -535 mL    ====================  NEW LABS:  IMPRESSIONS:Abnormal Study  * The left ventricle was enlarged.  * The anterior, anteroseptal, anterolateral, apical, and  mid to distal inferoseptal walls demonstrate mostly  preserved perfusion on rest imaging suggestive of viable  myocardium.  * There is a moderate to severe defect in the distal  inferior and distal inferolateral walls that is mostly  fixed on delay imaging suggestive of mild to moderate  viability.  * There is a small, severe defect in the basal  inferoseptum that demonstrates improved perfsuion on delay  imaging suggestive of mild to moderate residual viability.  * There are severe defects in the basal to mid inferior  and basal to mid inferolateral walls that are mostly fixed  on delay imaging suggestive of infarct with minimal  residual viability.  * Rest gated wall motion analysis was performed (LVEF = 12  %;LVEDV = 366 ml.) revealing akinesis of the basal to mid  inferior, basal to mid inferolateral, and basal  inferoseptal walls and hypokinesis of the remaining  segments.  *** No previous Nuclear/Stress exam.    < end of copied text >      ====================                          14.6   6.5   )-----------( 182      ( 19 Nov 2018 04:08 )             41.6     11-19    137  |  103  |  20  ----------------------------<  151<H>  4.1   |  23  |  0.84    Ca    9.6      19 Nov 2018 04:08  Phos  3.6     11-19  Mg     2.0     11-19    TPro  6.5  /  Alb  3.8  /  TBili  0.8  /  DBili  x   /  AST  37  /  ALT  42  /  AlkPhos  54  11-18    ====================  Plan:   ADHF: Appears euvolemic at this time. As per HF: can consider starting 20 PO lasix QD if needed   - Severe MR: Continue Captopril and BB with plan to switch to Lisinopril for AM dose   - NSTEMI: found to have TVD. Continue ASA, plavix, statin, BB, and ACE. Viability study showing several areas of viable myocardium - - pending CTS evaluation, with contingency plan for high risk PCI     ====================    Minnie Juares  PGY2    CCU Fellow Addendum     Agree with above. Pt had viable myocardium on study. Plan for high risk PCI tomorrow. c/w ASA, Plavix, beta blocker, statin. Will hold ACE for now given hypotension. Pt is euvolemic on exam.    Contreras Still MD  Cardiology Fellow  p: [phone]

## 2018-11-20 NOTE — PROGRESS NOTE ADULT - SUBJECTIVE AND OBJECTIVE BOX
HPI / INTERVAL HISTORY:    59 y/o male with PMH of T2DM who presented to [facility] with SOB and midsternal chest tightness. In the ED, D-Dimer was 681, lactate 6.9, BNP 1924 CT chest was negative for PE. CXR showed extensive regions of parenchymal consolidation and groundglass opacities to bilateral lung fields with small/moderate pleural effusions, lasix 40 mg IVP and duoNebs x1 were given and placed on BiPAP.  Echo also showed severe global dysfunction with elevated Trop 0.6, pt was loaded with  mg and Plavix 600 mg and transferred to [facility] for NSTEMI.      In ED, continued with BiPAP and pt was given lasix 40 mg x2 IVP with output of 1L and started on nitroglycerin gtt for elevated BP.  Transferred to CCU for further management. (14 Nov 2018 15:02)    TTE on 11/14 showed severe global LVSF with EF 15%. A cardiac cath on 11/15 showed stenosis of the pLAD 90%, mLCX 100%, pRCA 100%. A VERN on 11/16 showed EF 16%, severe MR, severe LA & LV enlargement, global LVSD, severe stage III DD.    INTERVAL HISTORY: Patient completed viability study yesterday, which showed that the anterior, anteroseptal, anterolateral, apical, andmid to distal inferoseptal walls demonstrate mostly  preserved perfusion on rest imaging suggestive of viable  myocardium. Patient's metoprolol was increased to 25mg q12h. Patient was evaluated by CT surgery, who recommended no CABG, rec PCI. Patient to have high risk PCI today. Patient was seen by Heart Failure team yesterday, who recommended switching captopril to lisinopril, but this was held off d/t blood pressures.     REVIEW OF SYSTEMS:    CONSTITUTIONAL: No weakness, fevers or chills  EYES/ENT: No visual changes;  No vertigo or throat pain   NECK: No pain or stiffness  RESPIRATORY: No cough, wheezing, hemoptysis; No shortness of breath  CARDIOVASCULAR: No chest pain or palpitations  GASTROINTESTINAL: No abdominal or epigastric pain. No nausea, vomiting, or hematemesis; No diarrhea or constipation. No melena or hematochezia.  GENITOURINARY: No dysuria, frequency or hematuria  NEUROLOGICAL: No numbness or weakness  SKIN: No itching, rashes      OBJECTIVE:  VITAL SIGNS:  ICU Vital Signs Last 24 Hrs  T(C): 36.7 (20 Nov 2018 04:00), Max: 36.8 (19 Nov 2018 16:00)  T(F): 98 (20 Nov 2018 04:00), Max: 98.3 (19 Nov 2018 16:00)  HR: 60 (20 Nov 2018 07:00) (52 - 82)  BP: 100/69 (20 Nov 2018 07:00) (71/49 - 112/91)  BP(mean): 78 (20 Nov 2018 07:00) (55 - 103)  ABP: --  ABP(mean): --  RR: 16 (20 Nov 2018 07:00) (15 - 20)  SpO2: 96% (20 Nov 2018 07:00) (95% - 98%)        11-19 @ 07:01  -  11-20 @ 07:00  --------------------------------------------------------  IN: 830 mL / OUT: 2175 mL / NET: -1345 mL      CAPILLARY BLOOD GLUCOSE      POCT Blood Glucose.: 144 mg/dL (20 Nov 2018 07:36)      PHYSICAL EXAM:  General: WN/WD NAD  Neurology: Awake, nonfocal, [place] x 4  Eyes: Scleras clear, PERRLA/ EOMI, Gross vision intact  ENT:Gross hearing intact, grossly patent pharynx, no stridor  Neck: Neck supple, trachea midline, No JVD,   Respiratory: BS diminished at bases B/L, No wheezing, rales, rhonchi  CV: RRR, S1S2, no murmurs, rubs or gallops  Abdominal: Soft, NT, ND +BS,   Extremities: No edema, + peripheral pulses  Skin: No Rashes, Hematoma, Ecchymosis  Lymphatic: No Neck, axilla, groin LAD  Psych: Oriented x 3, normal affect    LABS:                        14.5   5.7   )-----------( 180      ( 20 Nov 2018 05:05 )             43.8     11-20    137  |  103  |  18  ----------------------------<  139<H>  4.4   |  20<L>  |  0.83    Ca    9.4      20 Nov 2018 05:05  Phos  3.5     11-20  Mg     2.1     11-20    TPro  6.6  /  Alb  3.8  /  TBili  0.8  /  DBili  x   /  AST  39  /  ALT  56<H>  /  AlkPhos  58  11-20    LIVER FUNCTIONS - ( 20 Nov 2018 05:05 )  Alb: 3.8 g/dL / Pro: 6.6 g/dL / ALK PHOS: 58 U/L / ALT: 56 U/L / AST: 39 U/L / GGT: x           PT/INR - ( 20 Nov 2018 05:05 )   PT: 13.5 sec;   INR: 1.18 ratio         PTT - ( 20 Nov 2018 05:05 )  PTT:29.8 sec            RADIOLOGY & ADDITIONAL TESTS:       MEDICATIONS:  aspirin enteric coated 81 milliGRAM(s) Oral daily  atorvastatin 80 milliGRAM(s) Oral at bedtime  captopril 25 milliGRAM(s) Oral every 8 hours  chlorhexidine 4% Liquid 1 Application(s) Topical <User Schedule>  clopidogrel Tablet 75 milliGRAM(s) Oral daily  dextrose 40% Gel 15 Gram(s) Oral once PRN  dextrose 5%. 1000 milliLiter(s) IV Continuous <Continuous>  dextrose 50% Injectable 12.5 Gram(s) IV Push once  dextrose 50% Injectable 25 Gram(s) IV Push once  dextrose 50% Injectable 25 Gram(s) IV Push once  glucagon  Injectable 1 milliGRAM(s) IntraMuscular once PRN  heparin  Injectable 5000 Unit(s) SubCutaneous every 12 hours  influenza   Vaccine 0.5 milliLiter(s) IntraMuscular once  insulin lispro (HumaLOG) corrective regimen sliding scale   SubCutaneous at bedtime  insulin lispro (HumaLOG) corrective regimen sliding scale   SubCutaneous three times a day before meals  metoprolol tartrate 25 milliGRAM(s) Oral every 12 hours      ALLERGIES:  No Known Allergies

## 2018-11-21 LAB
ALBUMIN SERPL ELPH-MCNC: 3.7 G/DL — SIGNIFICANT CHANGE UP (ref 3.3–5)
ALP SERPL-CCNC: 53 U/L — SIGNIFICANT CHANGE UP (ref 40–120)
ALT FLD-CCNC: 43 U/L — SIGNIFICANT CHANGE UP (ref 10–45)
ANION GAP SERPL CALC-SCNC: 15 MMOL/L — SIGNIFICANT CHANGE UP (ref 5–17)
APTT BLD: 27.4 SEC — LOW (ref 27.5–36.3)
AST SERPL-CCNC: 27 U/L — SIGNIFICANT CHANGE UP (ref 10–40)
BASOPHILS # BLD AUTO: 0 K/UL — SIGNIFICANT CHANGE UP (ref 0–0.2)
BASOPHILS NFR BLD AUTO: 0.5 % — SIGNIFICANT CHANGE UP (ref 0–2)
BILIRUB SERPL-MCNC: 0.7 MG/DL — SIGNIFICANT CHANGE UP (ref 0.2–1.2)
BUN SERPL-MCNC: 21 MG/DL — SIGNIFICANT CHANGE UP (ref 7–23)
CALCIUM SERPL-MCNC: 9.3 MG/DL — SIGNIFICANT CHANGE UP (ref 8.4–10.5)
CHLORIDE SERPL-SCNC: 101 MMOL/L — SIGNIFICANT CHANGE UP (ref 96–108)
CO2 SERPL-SCNC: 20 MMOL/L — LOW (ref 22–31)
CREAT SERPL-MCNC: 0.89 MG/DL — SIGNIFICANT CHANGE UP (ref 0.5–1.3)
EOSINOPHIL # BLD AUTO: 0.1 K/UL — SIGNIFICANT CHANGE UP (ref 0–0.5)
EOSINOPHIL NFR BLD AUTO: 1.8 % — SIGNIFICANT CHANGE UP (ref 0–6)
GLUCOSE BLDC GLUCOMTR-MCNC: 116 MG/DL — HIGH (ref 70–99)
GLUCOSE BLDC GLUCOMTR-MCNC: 131 MG/DL — HIGH (ref 70–99)
GLUCOSE BLDC GLUCOMTR-MCNC: 135 MG/DL — HIGH (ref 70–99)
GLUCOSE BLDC GLUCOMTR-MCNC: 142 MG/DL — HIGH (ref 70–99)
GLUCOSE SERPL-MCNC: 136 MG/DL — HIGH (ref 70–99)
HCT VFR BLD CALC: 38.8 % — LOW (ref 39–50)
HCT VFR BLD CALC: 40.5 % — SIGNIFICANT CHANGE UP (ref 39–50)
HGB BLD-MCNC: 13.6 G/DL — SIGNIFICANT CHANGE UP (ref 13–17)
HGB BLD-MCNC: 14 G/DL — SIGNIFICANT CHANGE UP (ref 13–17)
INR BLD: 1.15 RATIO — SIGNIFICANT CHANGE UP (ref 0.88–1.16)
LYMPHOCYTES # BLD AUTO: 1.6 K/UL — SIGNIFICANT CHANGE UP (ref 1–3.3)
LYMPHOCYTES # BLD AUTO: 22 % — SIGNIFICANT CHANGE UP (ref 13–44)
MAGNESIUM SERPL-MCNC: 2.1 MG/DL — SIGNIFICANT CHANGE UP (ref 1.6–2.6)
MCHC RBC-ENTMCNC: 30.1 PG — SIGNIFICANT CHANGE UP (ref 27–34)
MCHC RBC-ENTMCNC: 30.2 PG — SIGNIFICANT CHANGE UP (ref 27–34)
MCHC RBC-ENTMCNC: 34.5 GM/DL — SIGNIFICANT CHANGE UP (ref 32–36)
MCHC RBC-ENTMCNC: 35.1 GM/DL — SIGNIFICANT CHANGE UP (ref 32–36)
MCV RBC AUTO: 85.8 FL — SIGNIFICANT CHANGE UP (ref 80–100)
MCV RBC AUTO: 87.4 FL — SIGNIFICANT CHANGE UP (ref 80–100)
MONOCYTES # BLD AUTO: 0.9 K/UL — SIGNIFICANT CHANGE UP (ref 0–0.9)
MONOCYTES NFR BLD AUTO: 12.8 % — SIGNIFICANT CHANGE UP (ref 2–14)
NEUTROPHILS # BLD AUTO: 4.6 K/UL — SIGNIFICANT CHANGE UP (ref 1.8–7.4)
NEUTROPHILS NFR BLD AUTO: 62.9 % — SIGNIFICANT CHANGE UP (ref 43–77)
PHOSPHATE SERPL-MCNC: 4.1 MG/DL — SIGNIFICANT CHANGE UP (ref 2.5–4.5)
PLATELET # BLD AUTO: 182 K/UL — SIGNIFICANT CHANGE UP (ref 150–400)
PLATELET # BLD AUTO: 209 K/UL — SIGNIFICANT CHANGE UP (ref 150–400)
POTASSIUM SERPL-MCNC: 4.3 MMOL/L — SIGNIFICANT CHANGE UP (ref 3.5–5.3)
POTASSIUM SERPL-SCNC: 4.3 MMOL/L — SIGNIFICANT CHANGE UP (ref 3.5–5.3)
PROT SERPL-MCNC: 6.4 G/DL — SIGNIFICANT CHANGE UP (ref 6–8.3)
PROTHROM AB SERPL-ACNC: 13.3 SEC — HIGH (ref 10–12.9)
RBC # BLD: 4.52 M/UL — SIGNIFICANT CHANGE UP (ref 4.2–5.8)
RBC # BLD: 4.64 M/UL — SIGNIFICANT CHANGE UP (ref 4.2–5.8)
RBC # FLD: 12.5 % — SIGNIFICANT CHANGE UP (ref 10.3–14.5)
RBC # FLD: 13.2 % — SIGNIFICANT CHANGE UP (ref 10.3–14.5)
SODIUM SERPL-SCNC: 136 MMOL/L — SIGNIFICANT CHANGE UP (ref 135–145)
WBC # BLD: 6.82 K/UL — SIGNIFICANT CHANGE UP (ref 3.8–10.5)
WBC # BLD: 7.3 K/UL — SIGNIFICANT CHANGE UP (ref 3.8–10.5)
WBC # FLD AUTO: 6.82 K/UL — SIGNIFICANT CHANGE UP (ref 3.8–10.5)
WBC # FLD AUTO: 7.3 K/UL — SIGNIFICANT CHANGE UP (ref 3.8–10.5)

## 2018-11-21 PROCEDURE — 99233 SBSQ HOSP IP/OBS HIGH 50: CPT | Mod: GC

## 2018-11-21 RX ORDER — SODIUM CHLORIDE 9 MG/ML
500 INJECTION INTRAMUSCULAR; INTRAVENOUS; SUBCUTANEOUS ONCE
Qty: 0 | Refills: 0 | Status: COMPLETED | OUTPATIENT
Start: 2018-11-21 | End: 2018-11-21

## 2018-11-21 RX ORDER — METOPROLOL TARTRATE 50 MG
25 TABLET ORAL DAILY
Qty: 0 | Refills: 0 | Status: DISCONTINUED | OUTPATIENT
Start: 2018-11-21 | End: 2018-11-22

## 2018-11-21 RX ORDER — CAPTOPRIL 12.5 MG/1
12.5 TABLET ORAL EVERY 8 HOURS
Qty: 0 | Refills: 0 | Status: DISCONTINUED | OUTPATIENT
Start: 2018-11-21 | End: 2018-11-21

## 2018-11-21 RX ORDER — SODIUM CHLORIDE 9 MG/ML
500 INJECTION INTRAMUSCULAR; INTRAVENOUS; SUBCUTANEOUS
Qty: 0 | Refills: 0 | Status: DISCONTINUED | OUTPATIENT
Start: 2018-11-21 | End: 2018-11-21

## 2018-11-21 RX ORDER — SODIUM CHLORIDE 9 MG/ML
1000 INJECTION INTRAMUSCULAR; INTRAVENOUS; SUBCUTANEOUS
Qty: 0 | Refills: 0 | Status: DISCONTINUED | OUTPATIENT
Start: 2018-11-21 | End: 2018-11-21

## 2018-11-21 RX ADMIN — ATORVASTATIN CALCIUM 80 MILLIGRAM(S): 80 TABLET, FILM COATED ORAL at 21:06

## 2018-11-21 RX ADMIN — HEPARIN SODIUM 5000 UNIT(S): 5000 INJECTION INTRAVENOUS; SUBCUTANEOUS at 17:35

## 2018-11-21 RX ADMIN — SODIUM CHLORIDE 100 MILLILITER(S): 9 INJECTION INTRAMUSCULAR; INTRAVENOUS; SUBCUTANEOUS at 12:29

## 2018-11-21 RX ADMIN — HEPARIN SODIUM 5000 UNIT(S): 5000 INJECTION INTRAVENOUS; SUBCUTANEOUS at 05:47

## 2018-11-21 RX ADMIN — CAPTOPRIL 25 MILLIGRAM(S): 12.5 TABLET ORAL at 09:12

## 2018-11-21 RX ADMIN — Medication 81 MILLIGRAM(S): at 12:29

## 2018-11-21 RX ADMIN — Medication 25 MILLIGRAM(S): at 05:46

## 2018-11-21 RX ADMIN — CHLORHEXIDINE GLUCONATE 1 APPLICATION(S): 213 SOLUTION TOPICAL at 21:07

## 2018-11-21 RX ADMIN — CLOPIDOGREL BISULFATE 75 MILLIGRAM(S): 75 TABLET, FILM COATED ORAL at 12:29

## 2018-11-21 NOTE — PROGRESS NOTE ADULT - SUBJECTIVE AND OBJECTIVE BOX
HPI / INTERVAL HISTORY:      57 y/o male with PMH of T2DM who presented to [facility] with SOB and midsternal chest tightness. Echo showed severe global dysfunction with elevated Trop 0.6, pt was loaded with  mg and Plavix 600 mg and transferred to [facility] for NSTEMI.      In ED, continued with BiPAP and pt was given lasix 40 mg x2 IVP with output of 1L and started on nitroglycerin gtt for elevated BP.  Transferred to CCU for further management. (14 Nov 2018 15:02)    TTE on 11/14 showed severe global LVSF with EF 15%. A cardiac cath on 11/15 showed stenosis of the pLAD 90%, mLCX 100%, pRCA 100%. A VERN on 11/16 showed EF 16%, severe MR, severe LA & LV enlargement, global LVSD, severe stage III DD. A viability study was performed which showed that the anterior, anteroseptal, anterolateral, apical, and mid to distal inferoseptal walls demonstrate mostly preserved perfusion on rest imaging suggestive of viable  myocardium. CT surgery was consulted, who felt patient was not candidate for CABG, and decision was made for patient to undergo Impella-assisted PCI on 11/20/18. Stent x 1 placed to the pLAD (95%).    INTERVAL HISTORY: Patient had Impella-assisted PCI yesterday w/ stent x 1 placed to the pLAD (95%). Patient had an episode of hypotension after returning from cath lab. An ECHO showed no pericardial effusion, and a CT ab/pelv showed no retroperitoneal bleed. An H&H was stable, and patient with no new symptoms or complaints, so likely vasovagal in nature.    REVIEW OF SYSTEMS:    CONSTITUTIONAL: No weakness, fevers or chills  EYES/ENT: No visual changes;  No vertigo or throat pain   NECK: No pain or stiffness  RESPIRATORY: No cough, wheezing, hemoptysis; No shortness of breath  CARDIOVASCULAR: No chest pain or palpitations  GASTROINTESTINAL: No abdominal or epigastric pain. No nausea, vomiting, or hematemesis; No diarrhea or constipation. No melena or hematochezia.  GENITOURINARY: No dysuria, frequency or hematuria  NEUROLOGICAL: No numbness or weakness  SKIN: No itching, rashes      OBJECTIVE:  VITAL SIGNS:  ICU Vital Signs Last 24 Hrs  T(C): 36.7 (21 Nov 2018 01:30), Max: 36.8 (20 Nov 2018 15:45)  T(F): 98 (21 Nov 2018 01:30), Max: 98.2 (20 Nov 2018 15:45)  HR: 58 (21 Nov 2018 07:00) (48 - 94)  BP: 106/77 (21 Nov 2018 07:00) (39/32 - 139/72)  BP(mean): 85 (21 Nov 2018 07:00) (37 - 101)  ABP: --  ABP(mean): --  RR: 14 (21 Nov 2018 07:00) (10 - 30)  SpO2: 96% (21 Nov 2018 07:00) (93% - 99%)        11-20 @ 07:01  -  11-21 @ 07:00  --------------------------------------------------------  IN: 950 mL / OUT: 1350 mL / NET: -400 mL      CAPILLARY BLOOD GLUCOSE      POCT Blood Glucose.: 145 mg/dL (20 Nov 2018 21:06)      PHYSICAL EXAM:  General: WN/WD NAD  Neurology: Awake, nonfocal, [place] x 4  Eyes: Scleras clear, PERRLA/ EOMI, Gross vision intact  ENT:Gross hearing intact, grossly patent pharynx, no stridor  Neck: Neck supple, trachea midline, No JVD,   Respiratory: BS diminished at bases B/L, No wheezing, rales, rhonchi  CV: RRR, S1S2, no murmurs, rubs or gallops  Abdominal: Soft, NT, ND +BS,   Extremities: No edema, + peripheral pulses  Skin: No Rashes, Hematoma, Ecchymosis  Lymphatic: No Neck, axilla, groin LAD  Psych: Oriented x 3, normal affect    LABS:                        14.0   7.3   )-----------( 182      ( 21 Nov 2018 05:28 )             40.5     11-21    136  |  101  |  21  ----------------------------<  136<H>  4.3   |  20<L>  |  0.89    Ca    9.3      21 Nov 2018 05:28  Phos  4.1     11-21  Mg     2.1     11-21    TPro  6.4  /  Alb  3.7  /  TBili  0.7  /  DBili  x   /  AST  27  /  ALT  43  /  AlkPhos  53  11-21    LIVER FUNCTIONS - ( 21 Nov 2018 05:28 )  Alb: 3.7 g/dL / Pro: 6.4 g/dL / ALK PHOS: 53 U/L / ALT: 43 U/L / AST: 27 U/L / GGT: x           PT/INR - ( 21 Nov 2018 05:28 )   PT: 13.3 sec;   INR: 1.15 ratio         PTT - ( 21 Nov 2018 05:28 )  PTT:27.4 sec        RADIOLOGY & ADDITIONAL TESTS:       MEDICATIONS:  aspirin enteric coated 81 milliGRAM(s) Oral daily  atorvastatin 80 milliGRAM(s) Oral at bedtime  captopril 25 milliGRAM(s) Oral every 8 hours  chlorhexidine 4% Liquid 1 Application(s) Topical <User Schedule>  clopidogrel Tablet 75 milliGRAM(s) Oral daily  dextrose 40% Gel 15 Gram(s) Oral once PRN  dextrose 5%. 1000 milliLiter(s) IV Continuous <Continuous>  dextrose 50% Injectable 12.5 Gram(s) IV Push once  dextrose 50% Injectable 25 Gram(s) IV Push once  dextrose 50% Injectable 25 Gram(s) IV Push once  glucagon  Injectable 1 milliGRAM(s) IntraMuscular once PRN  heparin  Injectable 5000 Unit(s) SubCutaneous every 12 hours  influenza   Vaccine 0.5 milliLiter(s) IntraMuscular once  insulin lispro (HumaLOG) corrective regimen sliding scale   SubCutaneous at bedtime  insulin lispro (HumaLOG) corrective regimen sliding scale   SubCutaneous three times a day before meals  metoprolol succinate ER 25 milliGRAM(s) Oral daily      ALLERGIES:  No Known Allergies HPI / INTERVAL HISTORY:      57 y/o male with PMH of T2DM who presented to [facility] with SOB and midsternal chest tightness. Echo showed severe global dysfunction with elevated Trop 0.6, pt was loaded with  mg and Plavix 600 mg and transferred to [facility] for NSTEMI.      In ED, continued with BiPAP and pt was given lasix 40 mg x2 IVP with output of 1L and started on nitroglycerin gtt for elevated BP.  Transferred to CCU for further management. (14 Nov 2018 15:02)    TTE on 11/14 showed severe global LVSF with EF 15%. A cardiac cath on 11/15 showed stenosis of the pLAD 90%, mLCX 100%, pRCA 100%. A VERN on 11/16 showed EF 16%, severe MR, severe LA & LV enlargement, global LVSD, severe stage III DD. A viability study was performed which showed that the anterior, anteroseptal, anterolateral, apical, and mid to distal inferoseptal walls demonstrate mostly preserved perfusion on rest imaging suggestive of viable  myocardium. CT surgery was consulted, who felt patient was not candidate for CABG, and decision was made for patient to undergo Impella-assisted PCI on 11/20/18. Stent x 1 placed to the pLAD (95%).    INTERVAL HISTORY: Patient had Impella-assisted PCI yesterday w/ stent x 1 placed to the pLAD (95%). Patient had an episode of hypotension after returning from cath lab. An ECHO showed no pericardial effusion, and a CT ab/pelv showed no retroperitoneal bleed. An H&H was stable, and patient with no new symptoms or complaints, so likely vasovagal in nature. Overnight blood pressures dropped mid 70s - mid 80s systolic with the doses of captopril and lopressor. Patient clinically otherwise asymptomatic.    REVIEW OF SYSTEMS:    CONSTITUTIONAL: No weakness, fevers or chills  EYES/ENT: No visual changes;  No vertigo or throat pain   NECK: No pain or stiffness  RESPIRATORY: No cough, wheezing, hemoptysis; No shortness of breath  CARDIOVASCULAR: No chest pain or palpitations  GASTROINTESTINAL: No abdominal or epigastric pain. No nausea, vomiting, or hematemesis; No diarrhea or constipation. No melena or hematochezia.  GENITOURINARY: No dysuria, frequency or hematuria  NEUROLOGICAL: No numbness or weakness  SKIN: No itching, rashes      OBJECTIVE:  VITAL SIGNS:  ICU Vital Signs Last 24 Hrs  T(C): 36.7 (21 Nov 2018 01:30), Max: 36.8 (20 Nov 2018 15:45)  T(F): 98 (21 Nov 2018 01:30), Max: 98.2 (20 Nov 2018 15:45)  HR: 58 (21 Nov 2018 07:00) (48 - 94)  BP: 106/77 (21 Nov 2018 07:00) (39/32 - 139/72)  BP(mean): 85 (21 Nov 2018 07:00) (37 - 101)  ABP: --  ABP(mean): --  RR: 14 (21 Nov 2018 07:00) (10 - 30)  SpO2: 96% (21 Nov 2018 07:00) (93% - 99%)        11-20 @ 07:01  -  11-21 @ 07:00  --------------------------------------------------------  IN: 950 mL / OUT: 1350 mL / NET: -400 mL      CAPILLARY BLOOD GLUCOSE      POCT Blood Glucose.: 145 mg/dL (20 Nov 2018 21:06)      PHYSICAL EXAM:  General: WN/WD NAD  Neurology: Awake, nonfocal, [place] x 4  Eyes: Scleras clear, PERRLA/ EOMI, Gross vision intact  ENT:Gross hearing intact, grossly patent pharynx, no stridor  Neck: Neck supple, trachea midline, No JVD,   Respiratory: BS diminished at bases B/L, No wheezing, rales, rhonchi  CV: RRR, S1S2, no murmurs, rubs or gallops  Abdominal: Soft, NT, ND +BS,   Extremities: No edema, + peripheral pulses  Skin: No Rashes, Hematoma, Ecchymosis  Lymphatic: No Neck, axilla, groin LAD  Psych: Oriented x 3, normal affect    LABS:                        14.0   7.3   )-----------( 182      ( 21 Nov 2018 05:28 )             40.5     11-21    136  |  101  |  21  ----------------------------<  136<H>  4.3   |  20<L>  |  0.89    Ca    9.3      21 Nov 2018 05:28  Phos  4.1     11-21  Mg     2.1     11-21    TPro  6.4  /  Alb  3.7  /  TBili  0.7  /  DBili  x   /  AST  27  /  ALT  43  /  AlkPhos  53  11-21    LIVER FUNCTIONS - ( 21 Nov 2018 05:28 )  Alb: 3.7 g/dL / Pro: 6.4 g/dL / ALK PHOS: 53 U/L / ALT: 43 U/L / AST: 27 U/L / GGT: x           PT/INR - ( 21 Nov 2018 05:28 )   PT: 13.3 sec;   INR: 1.15 ratio         PTT - ( 21 Nov 2018 05:28 )  PTT:27.4 sec        RADIOLOGY & ADDITIONAL TESTS:       MEDICATIONS:  aspirin enteric coated 81 milliGRAM(s) Oral daily  atorvastatin 80 milliGRAM(s) Oral at bedtime  captopril 25 milliGRAM(s) Oral every 8 hours  chlorhexidine 4% Liquid 1 Application(s) Topical <User Schedule>  clopidogrel Tablet 75 milliGRAM(s) Oral daily  dextrose 40% Gel 15 Gram(s) Oral once PRN  dextrose 5%. 1000 milliLiter(s) IV Continuous <Continuous>  dextrose 50% Injectable 12.5 Gram(s) IV Push once  dextrose 50% Injectable 25 Gram(s) IV Push once  dextrose 50% Injectable 25 Gram(s) IV Push once  glucagon  Injectable 1 milliGRAM(s) IntraMuscular once PRN  heparin  Injectable 5000 Unit(s) SubCutaneous every 12 hours  influenza   Vaccine 0.5 milliLiter(s) IntraMuscular once  insulin lispro (HumaLOG) corrective regimen sliding scale   SubCutaneous at bedtime  insulin lispro (HumaLOG) corrective regimen sliding scale   SubCutaneous three times a day before meals  metoprolol succinate ER 25 milliGRAM(s) Oral daily      ALLERGIES:  No Known Allergies

## 2018-11-21 NOTE — CHART NOTE - NSCHARTNOTEFT_GEN_A_CORE
====================  CCU MIDNIGHT ROUNDS  ====================    HOME RAMIREZ  [id number]  Patient is a 58y old  Male who presents with a chief complaint of SOB and chest tightness (20 Nov 2018 08:02)    ====================  SUMMARY:  ====================        ====================  NEW EVENTS:  ====================        ====================  VITALS (Last 12 hrs):  ====================    T(C): 36.7 (11-20-18 @ 21:00), Max: 36.8 (11-20-18 @ 15:45)  T(F): 98.1 (11-20-18 @ 21:00), Max: 98.2 (11-20-18 @ 15:45)  HR: 72 (11-21-18 @ 00:00) (48 - 94)  BP: 88/54 (11-21-18 @ 00:00) (39/32 - 139/72)  BP(mean): 65 (11-21-18 @ 00:00) (37 - 101)  ABP: --  ABP(mean): --  RR: 16 (11-21-18 @ 00:00) (10 - 30)  SpO2: 96% (11-21-18 @ 00:00) (93% - 99%)  Wt(kg): --  CVP(mm Hg): --  CVP(cm H2O): --  CO: --  CI: --  PA: --  PA(mean): --  PCWP: --  SVR: --  PVR: --    I&O's Summary    19 Nov 2018 07:01  -  20 Nov 2018 07:00  --------------------------------------------------------  IN: 830 mL / OUT: 2175 mL / NET: -1345 mL    20 Nov 2018 07:01  -  21 Nov 2018 00:58  --------------------------------------------------------  IN: 950 mL / OUT: 1150 mL / NET: -200 mL            ====================  NEW LABS:  ====================                          14.5   5.7   )-----------( 180      ( 20 Nov 2018 05:05 )             43.8     11-20    135  |  103  |  23  ----------------------------<  146<H>  4.4   |  18<L>  |  0.97    Ca    9.1      20 Nov 2018 16:17  Phos  3.8     11-20  Mg     2.0     11-20    TPro  6.4  /  Alb  3.8  /  TBili  0.5  /  DBili  x   /  AST  28  /  ALT  47<H>  /  AlkPhos  54  11-20    PT/INR - ( 20 Nov 2018 05:05 )   PT: 13.5 sec;   INR: 1.18 ratio         PTT - ( 20 Nov 2018 05:05 )  PTT:29.8 sec            ====================  Plan:  ====================    Minnie Juares  PGY2 ====================  CCU MIDNIGHT ROUNDS  ====================    HOME RAMIREZ  [id number]  Patient is a 58y old  Male who presents with a chief complaint of SOB and chest tightness (20 Nov 2018 08:02)    ====================  SUMMARY: 57 yo M w/ T2DM (on oral meds) who p/w NSTEMI found to have 3vessel CAD and new HFrEF (EF 15-20%, LVEDD 6 cm) with possible moderate-severe MR. Currently normotensive and euvolemic. Labs reviewed. Overall stage C HF, NYHA class III appears compensated and euvolemic  ====================    ====================  NEW EVENTS: patient went for PCI with ROSHNI x 1 to pLAD without mechanical assistance. Patient had an episode of hypotension after procedure: no pericardial effusion or RP bleeding noted on imaging. Intermittently on levo gtt. Overnight blood pressures dropped mid 70s - mid 80s systolic with the doses of captopril and lopressor. Patient clinically otherwise asymptomatic   ====================        ====================  VITALS (Last 12 hrs):  ====================    T(C): 36.7 (11-20-18 @ 21:00), Max: 36.8 (11-20-18 @ 15:45)  T(F): 98.1 (11-20-18 @ 21:00), Max: 98.2 (11-20-18 @ 15:45)  HR: 72 (11-21-18 @ 00:00) (48 - 94)  BP: 88/54 (11-21-18 @ 00:00) (39/32 - 139/72)  BP(mean): 65 (11-21-18 @ 00:00) (37 - 101)  ABP: --  ABP(mean): --  RR: 16 (11-21-18 @ 00:00) (10 - 30)  SpO2: 96% (11-21-18 @ 00:00) (93% - 99%)  Wt(kg): --  CVP(mm Hg): --  CVP(cm H2O): --  CO: --  CI: --  PA: --  PA(mean): --  PCWP: --  SVR: --  PVR: --    I&O's Summary    19 Nov 2018 07:01  -  20 Nov 2018 07:00  --------------------------------------------------------  IN: 830 mL / OUT: 2175 mL / NET: -1345 mL    20 Nov 2018 07:01  -  21 Nov 2018 00:58  --------------------------------------------------------  IN: 950 mL / OUT: 1150 mL / NET: -200 mL            ====================  NEW LABS:  ====================                          14.5   5.7   )-----------( 180      ( 20 Nov 2018 05:05 )             43.8     11-20    135  |  103  |  23  ----------------------------<  146<H>  4.4   |  18<L>  |  0.97    Ca    9.1      20 Nov 2018 16:17  Phos  3.8     11-20  Mg     2.0     11-20    TPro  6.4  /  Alb  3.8  /  TBili  0.5  /  DBili  x   /  AST  28  /  ALT  47<H>  /  AlkPhos  54  11-20    PT/INR - ( 20 Nov 2018 05:05 )   PT: 13.5 sec;   INR: 1.18 ratio         PTT - ( 20 Nov 2018 05:05 )  PTT:29.8 sec            ====================  Plan:  ====================    Minnie Juares  PGY2 ====================  CCU MIDNIGHT ROUNDS  ====================    HOME RAMIREZ  [id number]  Patient is a 58y old  Male who presents with a chief complaint of SOB and chest tightness (20 Nov 2018 08:02)    ====================  SUMMARY: 57 yo M w/ T2DM (on oral meds) who p/w NSTEMI found to have 3vessel CAD and new HFrEF (EF 15-20%, LVEDD 6 cm) with possible moderate-severe MR. Currently normotensive and euvolemic. Labs reviewed. Overall stage C HF, NYHA class III appears compensated and euvolemic  ====================    ====================  NEW EVENTS: patient went for PCI with ROSHNI x 1 to pLAD without mechanical assistance. Patient had an episode of hypotension after procedure: no pericardial effusion or RP bleeding noted on imaging. Intermittently on levo gtt. Overnight blood pressures dropped mid 70s - mid 80s systolic with the doses of captopril and lopressor. Patient clinically otherwise asymptomatic   ====================        ====================  VITALS (Last 12 hrs):  ====================    T(C): 36.7 (11-20-18 @ 21:00), Max: 36.8 (11-20-18 @ 15:45)  T(F): 98.1 (11-20-18 @ 21:00), Max: 98.2 (11-20-18 @ 15:45)  HR: 72 (11-21-18 @ 00:00) (48 - 94)  BP: 88/54 (11-21-18 @ 00:00) (39/32 - 139/72)  BP(mean): 65 (11-21-18 @ 00:00) (37 - 101)  ABP: --  ABP(mean): --  RR: 16 (11-21-18 @ 00:00) (10 - 30)  SpO2: 96% (11-21-18 @ 00:00) (93% - 99%)  Wt(kg): --  CVP(mm Hg): --  CVP(cm H2O): --  CO: --  CI: --  PA: --  PA(mean): --  PCWP: --  SVR: --  PVR: --    I&O's Summary    19 Nov 2018 07:01  -  20 Nov 2018 07:00  --------------------------------------------------------  IN: 830 mL / OUT: 2175 mL / NET: -1345 mL    20 Nov 2018 07:01  -  21 Nov 2018 00:58  --------------------------------------------------------  IN: 950 mL / OUT: 1150 mL / NET: -200 mL            ====================  NEW LABS:  ====================                          14.5   5.7   )-----------( 180      ( 20 Nov 2018 05:05 )             43.8     11-20    135  |  103  |  23  ----------------------------<  146<H>  4.4   |  18<L>  |  0.97    Ca    9.1      20 Nov 2018 16:17  Phos  3.8     11-20  Mg     2.0     11-20    TPro  6.4  /  Alb  3.8  /  TBili  0.5  /  DBili  x   /  AST  28  /  ALT  47<H>  /  AlkPhos  54  11-20    PT/INR - ( 20 Nov 2018 05:05 )   PT: 13.5 sec;   INR: 1.18 ratio         PTT - ( 20 Nov 2018 05:05 )  PTT:29.8 sec    ====================  Plan:   ADHF: Appears euvolemic at this time.   - Severe MR: on captopril and beta blocker, however becomes hypotensive with systolics in the 70s, will need to downtitrate the dosages   - NSTEMI: found to have TVD. Continue ASA, plavix, statin, BB, and ACE. Underwent PCI today s/p ROSHNI x 1 to pLAD, came back without mechanical assistance   ====================    Minnie Juares  PGY2

## 2018-11-21 NOTE — PROGRESS NOTE ADULT - ASSESSMENT
57 y/o M w/ h/o DM (on oral meds) who presented with acute SOB and chest tightness found to have 3vessel CAD and new HFrEF (EF 15-20%, LVEDD 6 cm) with possible moderate-severe MR. Currently normotensive and euvolemic. Labs reviewed. Overall stage C HF, NYHA class III appears compensated and euvolemic.  - discontinue captopril  - start lisinopril 5 mg  - hold on further diuretics as euvolemic; may require lasix 20 mg PO daily   - standing weights daily  - continue metop 25 mg  - CAD: on ASA 81 mg daily, lipitor 80 mg qhs  - discussed clinical course with patient and counseled on sodium/fluid restriction    Kal Cardona MD

## 2018-11-21 NOTE — PROGRESS NOTE ADULT - SUBJECTIVE AND OBJECTIVE BOX
Cardiology Progress Note    Interval events: Following PCI to LAD, patient had episode of hypotension. Received 250 cc NS and atropine for presumed vagal episode. Briefly on norepi as per flowsheets. Patients BP improved following. No complaints this morning.    Tele: Sinus, PVCs    Medications:  aspirin enteric coated 81 milliGRAM(s) Oral daily  atorvastatin 80 milliGRAM(s) Oral at bedtime  chlorhexidine 4% Liquid 1 Application(s) Topical <User Schedule>  clopidogrel Tablet 75 milliGRAM(s) Oral daily  dextrose 40% Gel 15 Gram(s) Oral once PRN  dextrose 5%. 1000 milliLiter(s) IV Continuous <Continuous>  dextrose 50% Injectable 12.5 Gram(s) IV Push once  dextrose 50% Injectable 25 Gram(s) IV Push once  dextrose 50% Injectable 25 Gram(s) IV Push once  glucagon  Injectable 1 milliGRAM(s) IntraMuscular once PRN  heparin  Injectable 5000 Unit(s) SubCutaneous every 12 hours  influenza   Vaccine 0.5 milliLiter(s) IntraMuscular once  insulin lispro (HumaLOG) corrective regimen sliding scale   SubCutaneous at bedtime  insulin lispro (HumaLOG) corrective regimen sliding scale   SubCutaneous three times a day before meals  metoprolol succinate ER 25 milliGRAM(s) Oral daily  sodium chloride 0.9%. 500 milliLiter(s) IV Continuous <Continuous>      Review of Systems:  Constitutional: [ ] Fever [ ] Chills [ ] Fatigue [ ] Weight change   HEENT: [ ] Blurred vision [ ] Eye Pain [ ] Headache [ ] Runny nose [ ] Sore Throat   Respiratory: [ ] Cough [ ] Wheezing [ ] Shortness of breath  Cardiovascular: [ ] Chest Pain [ ] Palpitations [ ] ARAIZA [ ] PND [ ] Orthopnea  Gastrointestinal: [ ] Abdominal Pain [ ] Diarrhea [ ] Constipation [ ] Hemorrhoids [ ] Nausea [ ] Vomiting  Genitourinary: [ ] Nocturia [ ] Dysuria [ ] Incontinence  Extremities: [ ] Swelling [ ] Joint Pain  Neurologic: [ ] Focal deficit [ ] Paresthesias [ ] Syncope  Lymphatic: [ ] Swelling [ ] Lymphadenopathy   Skin: [ ] Rash [ ] Ecchymoses [ ] Wounds [ ] Lesions  Psychiatry: [ ] Depression [ ] Suicidal/Homicidal Ideation [ ] Anxiety [ ] Sleep Disturbances  [x] 10 point review of systems is otherwise negative except as mentioned above            [ ]Unable to obtain    Vitals:  T(C): 36.7 (18 @ 01:30), Max: 36.8 (18 @ 15:45)  HR: 86 (18 @ 13:00) (48 - 94)  BP: 101/72 (18 @ 13:00) (39/32 - 139/72)  BP(mean): 81 (18 @ 13:00) (37 - 101)  RR: 21 (18 @ 13:00) (10 - 30)  SpO2: 95% (18 @ 13:00) (93% - 99%)  Wt(kg): --  Daily     Daily Weight in k.4 (2018 05:00)  I&O's Summary    2018 07:  -  2018 07:00  --------------------------------------------------------  IN: 950 mL / OUT: 1350 mL / NET: -400 mL    2018 07:01  -  2018 14:14  --------------------------------------------------------  IN: 480 mL / OUT: 850 mL / NET: -370 mL        Physical Exam:  NAD  PERRL, EOMI  Normal oral [place] NC/AT  S1, S2, RRR, No m/r/g appreciated, No edema, no elevation in JVP  Clear to auscultation bilaterally  Soft, Non-tender, Non-distended, BS+  No clubbing, No joint deformity   Non-focal  No lymphadenopathy  AAOx3, Mood & affect appropriate  No rashes, No ecchymoses, No cyanosis  Procedural Access Site: R groin ecchymosis, no hematoma, Non-tender to palpation, 2+ pulse , No bruit; L groin cdi    Labs:                        14.0   7.3   )-----------( 182      ( 2018 05:28 )             40.5     -    136  |  101  |  21  ----------------------------<  136<H>  4.3   |  20<L>  |  0.89    Ca    9.3      2018 05:28  Phos  4.1       Mg     2.1         TPro  6.4  /  Alb  3.7  /  TBili  0.7  /  DBili  x   /  AST  27  /  ALT  43  /  AlkPhos  53      PT/INR - ( 2018 05:28 )   PT: 13.3 sec;   INR: 1.15 ratio         PTT - ( 2018 05:28 )  PTT:27.4 sec    New results/imagin2018  CORONARY VESSELS:  LAD:   --  Proximal LAD: There was a 95 % stenosis.  COMPLICATIONS: There were no complications.  INTERVENTIONAL RECOMMENDATIONS: High risk PCI of single remaining vessel  LAD with LCX and RCA occluded and EF 10%. Impella 2.5 used prior to stent  for hemodynamic support. Pre close times 2 placed but after Impella sheath  removal persistent bleeding despite Perclose and Femstop placed. Aspirin  and Plavix for 1 yr

## 2018-11-21 NOTE — PROGRESS NOTE ADULT - ASSESSMENT
Assessment and Plan:   · Assessment	  59 yo M w/ T2DM (on oral meds) who p/w NSTEMI found to have 3vessel CAD and new HFrEF (EF 15-20%, LVEDD 6 cm) with possible moderate-severe MR, s/p Impella-Assisted PCI on 11/20/18 w/ stent x 1 to the pLAD. Currently normotensive and euvolemic. Overall stage C HF, NYHA class III appears compensated and euvolemic.    Neuro  -No active issues. Normal mental status.    Cardiac  ·  Problem:  Acute systolic heart failure.  Plan: - Pt appears euvolemic at this time w/ clear breath sounds and no edema.  - continue captopril to 25 mg q8h; hold for SBP <90  - Defer initiation of lisinopril given hypotension.  - hold on further diuretics as euvolemic; may require lasix 20 mg PO daily   - standing weights daily  - s/p Impella-Assisted PCI on 11/20/18 w/ stent x 1 placed to pLAD (95%).  - Increase metoprolol to 50mg XL tomorrow (11/22/18); continue metoprolol XL 25mg today, and give an additional 25mg dose today if BP tolerates.   - CAD: on ASA 81 mg daily, Plavix 75mg daily, Lipitor 80 mg qhs  - discussed clinical course with patient and counseled on sodium/fluid restriction  - benefit from mitral clip if MR does not improve?    ·  Problem: Non-rheumatic mitral regurgitation.    - CTS following for potential TVD w/ or w/o MVR, TTE showed mild MR.      Problem/Plan - 3:  ·  Problem: NSTEMI (non-ST elevated myocardial infarction).    Plan: - CE peaked at Trop H of 1458. No chest pain  - Continue ASA, Plavix, Lipitor. Continue Metoprolol, Captopril.     Pulm:  -No active issues    ID:   -No active issues.    Endo:   ·  Problem: Diabetes mellitus.  Plan: - Continue ISS.     DVT PPx: Continue Heparin sq.

## 2018-11-21 NOTE — STUDENT SIGN OFF DOCUMENT - DOCUMENTS STUDENTS ARE SIGNED OFF ON
Vital Signs/Plan of Care/Input and Output/Assessment and Intervention Patient Profile/Provider Contact Note/Dietitian Initial Evaluation

## 2018-11-21 NOTE — PROGRESS NOTE ADULT - SUBJECTIVE AND OBJECTIVE BOX
====================  CCU MIDNIGHT ROUNDS  ====================    HOME RAMIREZ  [id number]    Patient is a 58y old  Male who presents with a chief complaint of SOB and chest tightness (21 Nov 2018 14:14)    ====================  SUMMARY:  ====================    ====================  NEW EVENTS:  ====================    MEDICATIONS  (STANDING):  aspirin enteric coated 81 milliGRAM(s) Oral daily  atorvastatin 80 milliGRAM(s) Oral at bedtime  chlorhexidine 4% Liquid 1 Application(s) Topical <User Schedule>  clopidogrel Tablet 75 milliGRAM(s) Oral daily  dextrose 5%. 1000 milliLiter(s) (50 mL/Hr) IV Continuous <Continuous>  dextrose 50% Injectable 12.5 Gram(s) IV Push once  dextrose 50% Injectable 25 Gram(s) IV Push once  dextrose 50% Injectable 25 Gram(s) IV Push once  heparin  Injectable 5000 Unit(s) SubCutaneous every 12 hours  influenza   Vaccine 0.5 milliLiter(s) IntraMuscular once  insulin lispro (HumaLOG) corrective regimen sliding scale   SubCutaneous at bedtime  insulin lispro (HumaLOG) corrective regimen sliding scale   SubCutaneous three times a day before meals  metoprolol succinate ER 25 milliGRAM(s) Oral daily  sodium chloride 0.9%. 500 milliLiter(s) (100 mL/Hr) IV Continuous <Continuous>    MEDICATIONS  (PRN):  dextrose 40% Gel 15 Gram(s) Oral once PRN Blood Glucose LESS THAN 70 milliGRAM(s)/deciliter  glucagon  Injectable 1 milliGRAM(s) IntraMuscular once PRN Glucose LESS THAN 70 milligrams/deciliter    ====================  VITALS (Last 12 hrs):  ====================  T(C): 36.8 (11-21-18 @ 20:00), Max: 36.8 (11-21-18 @ 12:30)  T(F): 98.2 (11-21-18 @ 20:00), Max: 98.2 (11-21-18 @ 12:30)  HR: 72 (11-21-18 @ 20:00) (64 - 86)  BP: 104/60 (11-21-18 @ 20:00) (74/50 - 109/74)  BP(mean): 75 (11-21-18 @ 20:00) (56 - 87)  RR: 18 (11-21-18 @ 20:00) (13 - 29)  SpO2: 97% (11-21-18 @ 20:00) (95% - 97%)    I&O's Summary    20 Nov 2018 07:01  -  21 Nov 2018 07:00  --------------------------------------------------------  IN: 950 mL / OUT: 1350 mL / NET: -400 mL    21 Nov 2018 07:01  -  21 Nov 2018 20:49  --------------------------------------------------------  IN: 1385 mL / OUT: 1550 mL / NET: -165 mL      ====================  NEW LABS:  ====================  11-21    136  |  101  |  21  ----------------------------<  136<H>  4.3   |  20<L>  |  0.89    Ca    9.3      21 Nov 2018 05:28  Phos  4.1     11-21  Mg     2.1     11-21    TPro  6.4  /  Alb  3.7  /  TBili  0.7  /  DBili  x   /  AST  27  /  ALT  43  /  AlkPhos  53  11-21    PT/INR - ( 21 Nov 2018 05:28 )   PT: 13.3 sec;   INR: 1.15 ratio      PTT - ( 21 Nov 2018 05:28 )  PTT:27.4 sec    ====================  PLAN:  ====================      Mignon RUIZU NP  Beeper #[id number]  Spectra # [phone] ====================  CCU MIDNIGHT ROUNDS  ====================    HOME RAMIREZ  [id number]    Patient is a 58y old  Male who presents with a chief complaint of SOB and chest tightness (21 Nov 2018 14:14)    ====================  SUMMARY: 59 y/o male with PMH of T2DM who presented to [facility] with SOB and midsternal chest tightness. In the ED, D-Dimer was 681, lactate 6.9, BNP 1924 CT chest was negative for PE. CXR showed extensive regions of parenchymal consolidation and groundglass opacities to bilateral lung fields with small/moderate pleural effusions, lasix 40 mg IVP and duoNebs x1 were given and placed on BiPAP.  Echo also showed severe global dysfunction with elevated Trop 0.6, pt was loaded with  mg and Plavix 600 mg and transferred to [facility] for NSTEMI.    ====================    ====================  NEW EVENTS: s/p NS 500ml bolus with improvement in BP  ====================    MEDICATIONS  (STANDING):  aspirin enteric coated 81 milliGRAM(s) Oral daily  atorvastatin 80 milliGRAM(s) Oral at bedtime  chlorhexidine 4% Liquid 1 Application(s) Topical <User Schedule>  clopidogrel Tablet 75 milliGRAM(s) Oral daily  dextrose 5%. 1000 milliLiter(s) (50 mL/Hr) IV Continuous <Continuous>  dextrose 50% Injectable 12.5 Gram(s) IV Push once  dextrose 50% Injectable 25 Gram(s) IV Push once  dextrose 50% Injectable 25 Gram(s) IV Push once  heparin  Injectable 5000 Unit(s) SubCutaneous every 12 hours  influenza   Vaccine 0.5 milliLiter(s) IntraMuscular once  insulin lispro (HumaLOG) corrective regimen sliding scale   SubCutaneous at bedtime  insulin lispro (HumaLOG) corrective regimen sliding scale   SubCutaneous three times a day before meals  metoprolol succinate ER 25 milliGRAM(s) Oral daily  sodium chloride 0.9%. 500 milliLiter(s) (100 mL/Hr) IV Continuous <Continuous>    MEDICATIONS  (PRN):  dextrose 40% Gel 15 Gram(s) Oral once PRN Blood Glucose LESS THAN 70 milliGRAM(s)/deciliter  glucagon  Injectable 1 milliGRAM(s) IntraMuscular once PRN Glucose LESS THAN 70 milligrams/deciliter    ====================  VITALS (Last 12 hrs):  ====================  T(C): 36.8 (11-21-18 @ 20:00), Max: 36.8 (11-21-18 @ 12:30)  T(F): 98.2 (11-21-18 @ 20:00), Max: 98.2 (11-21-18 @ 12:30)  HR: 72 (11-21-18 @ 20:00) (64 - 86)  BP: 104/60 (11-21-18 @ 20:00) (74/50 - 109/74)  BP(mean): 75 (11-21-18 @ 20:00) (56 - 87)  RR: 18 (11-21-18 @ 20:00) (13 - 29)  SpO2: 97% (11-21-18 @ 20:00) (95% - 97%)    I&O's Summary    20 Nov 2018 07:01  -  21 Nov 2018 07:00  --------------------------------------------------------  IN: 950 mL / OUT: 1350 mL / NET: -400 mL    21 Nov 2018 07:01  -  21 Nov 2018 20:49  --------------------------------------------------------  IN: 1385 mL / OUT: 1550 mL / NET: -165 mL      ====================  NEW LABS:  ====================  11-21    136  |  101  |  21  ----------------------------<  136<H>  4.3   |  20<L>  |  0.89    Ca    9.3      21 Nov 2018 05:28  Phos  4.1     11-21  Mg     2.1     11-21    TPro  6.4  /  Alb  3.7  /  TBili  0.7  /  DBili  x   /  AST  27  /  ALT  43  /  AlkPhos  53  11-21    PT/INR - ( 21 Nov 2018 05:28 )   PT: 13.3 sec;   INR: 1.15 ratio      PTT - ( 21 Nov 2018 05:28 )  PTT:27.4 sec    ====================  PLAN:  ====================   ADHF: Appears euvolemic at this time.   - Severe MR: on captopril and beta blocker, however becomes hypotensive with systolics in the 70s, will need to downtitrate the dosages   - NSTEMI: found to have TVD. Continue ASA, plavix, statin, BB, and ACE. Underwent PCI today s/p ROSHNI x 1 to pLAD, came back without mechanical assistance     Mignon Zuleta CCU NP  Beeper #[id number]  Spectra # [phone] ====================  CCU MIDNIGHT ROUNDS  ====================    HOME RAMIREZ  [id number]    Patient is a 58y old  Male who presents with a chief complaint of SOB and chest tightness (21 Nov 2018 14:14)    ====================  SUMMARY: 59 y/o male with PMH of T2DM who presented to [facility] with SOB and midsternal chest tightness. In the ED, D-Dimer was 681, lactate 6.9, BNP 1924 CT chest was negative for PE. CXR showed extensive regions of parenchymal consolidation and groundglass opacities to bilateral lung fields with small/moderate pleural effusions, lasix 40 mg IVP and duoNebs x1 were given and placed on BiPAP.  Echo also showed severe global dysfunction with elevated Trop 0.6, pt was loaded with  mg and Plavix 600 mg and transferred to [facility] for NSTEMI.    ====================    ====================  NEW EVENTS: s/p NS 500ml bolus with improvement in BP  ====================    MEDICATIONS  (STANDING):  aspirin enteric coated 81 milliGRAM(s) Oral daily  atorvastatin 80 milliGRAM(s) Oral at bedtime  chlorhexidine 4% Liquid 1 Application(s) Topical <User Schedule>  clopidogrel Tablet 75 milliGRAM(s) Oral daily  dextrose 5%. 1000 milliLiter(s) (50 mL/Hr) IV Continuous <Continuous>  dextrose 50% Injectable 12.5 Gram(s) IV Push once  dextrose 50% Injectable 25 Gram(s) IV Push once  dextrose 50% Injectable 25 Gram(s) IV Push once  heparin  Injectable 5000 Unit(s) SubCutaneous every 12 hours  influenza   Vaccine 0.5 milliLiter(s) IntraMuscular once  insulin lispro (HumaLOG) corrective regimen sliding scale   SubCutaneous at bedtime  insulin lispro (HumaLOG) corrective regimen sliding scale   SubCutaneous three times a day before meals  metoprolol succinate ER 25 milliGRAM(s) Oral daily  sodium chloride 0.9%. 500 milliLiter(s) (100 mL/Hr) IV Continuous <Continuous>    MEDICATIONS  (PRN):  dextrose 40% Gel 15 Gram(s) Oral once PRN Blood Glucose LESS THAN 70 milliGRAM(s)/deciliter  glucagon  Injectable 1 milliGRAM(s) IntraMuscular once PRN Glucose LESS THAN 70 milligrams/deciliter    ====================  VITALS (Last 12 hrs):  ====================  T(C): 36.8 (11-21-18 @ 20:00), Max: 36.8 (11-21-18 @ 12:30)  T(F): 98.2 (11-21-18 @ 20:00), Max: 98.2 (11-21-18 @ 12:30)  HR: 72 (11-21-18 @ 20:00) (64 - 86)  BP: 104/60 (11-21-18 @ 20:00) (74/50 - 109/74)  BP(mean): 75 (11-21-18 @ 20:00) (56 - 87)  RR: 18 (11-21-18 @ 20:00) (13 - 29)  SpO2: 97% (11-21-18 @ 20:00) (95% - 97%)    I&O's Summary    20 Nov 2018 07:01  -  21 Nov 2018 07:00  --------------------------------------------------------  IN: 950 mL / OUT: 1350 mL / NET: -400 mL    21 Nov 2018 07:01  -  21 Nov 2018 20:49  --------------------------------------------------------  IN: 1385 mL / OUT: 1550 mL / NET: -165 mL      ====================  NEW LABS:  ====================  11-21    136  |  101  |  21  ----------------------------<  136<H>  4.3   |  20<L>  |  0.89    Ca    9.3      21 Nov 2018 05:28  Phos  4.1     11-21  Mg     2.1     11-21    TPro  6.4  /  Alb  3.7  /  TBili  0.7  /  DBili  x   /  AST  27  /  ALT  43  /  AlkPhos  53  11-21    PT/INR - ( 21 Nov 2018 05:28 )   PT: 13.3 sec;   INR: 1.15 ratio      PTT - ( 21 Nov 2018 05:28 )  PTT:27.4 sec    ====================  PLAN:  ====================  DAPT, high dose statin   Toprol XL 25mg po daily  Holding ACE due to liable BP  Volume status euvolemic   Strict I/Os, daily weights   Orthostatic BP     Mignon Zuleta CCU NP  Beeper #[id number]  Spectra # [id number] ====================  CCU MIDNIGHT ROUNDS  ====================    HOME RAMIREZ  [id number]    Patient is a 58y old  Male who presents with a chief complaint of SOB and chest tightness (21 Nov 2018 14:14)    ====================  SUMMARY: 59 y/o male with PMH of T2DM who presented to [facility] with SOB and midsternal chest tightness. In the ED, D-Dimer was 681, lactate 6.9, BNP 1924 CT chest was negative for PE. CXR showed extensive regions of parenchymal consolidation and groundglass opacities to bilateral lung fields with small/moderate pleural effusions, lasix 40 mg IVP and duoNebs x1 were given and placed on BiPAP.  Echo also showed severe global dysfunction with elevated Trop 0.6, pt was loaded with  mg and Plavix 600 mg and transferred to [facility] for NSTEMI.    ====================    ====================  NEW EVENTS: s/p NS 500ml bolus with improvement in BP  ====================    MEDICATIONS  (STANDING):  aspirin enteric coated 81 milliGRAM(s) Oral daily  atorvastatin 80 milliGRAM(s) Oral at bedtime  chlorhexidine 4% Liquid 1 Application(s) Topical <User Schedule>  clopidogrel Tablet 75 milliGRAM(s) Oral daily  dextrose 5%. 1000 milliLiter(s) (50 mL/Hr) IV Continuous <Continuous>  dextrose 50% Injectable 12.5 Gram(s) IV Push once  dextrose 50% Injectable 25 Gram(s) IV Push once  dextrose 50% Injectable 25 Gram(s) IV Push once  heparin  Injectable 5000 Unit(s) SubCutaneous every 12 hours  influenza   Vaccine 0.5 milliLiter(s) IntraMuscular once  insulin lispro (HumaLOG) corrective regimen sliding scale   SubCutaneous at bedtime  insulin lispro (HumaLOG) corrective regimen sliding scale   SubCutaneous three times a day before meals  metoprolol succinate ER 25 milliGRAM(s) Oral daily  sodium chloride 0.9%. 500 milliLiter(s) (100 mL/Hr) IV Continuous <Continuous>    MEDICATIONS  (PRN):  dextrose 40% Gel 15 Gram(s) Oral once PRN Blood Glucose LESS THAN 70 milliGRAM(s)/deciliter  glucagon  Injectable 1 milliGRAM(s) IntraMuscular once PRN Glucose LESS THAN 70 milligrams/deciliter    ====================  VITALS (Last 12 hrs):  ====================  T(C): 36.8 (11-21-18 @ 20:00), Max: 36.8 (11-21-18 @ 12:30)  T(F): 98.2 (11-21-18 @ 20:00), Max: 98.2 (11-21-18 @ 12:30)  HR: 72 (11-21-18 @ 20:00) (64 - 86)  BP: 104/60 (11-21-18 @ 20:00) (74/50 - 109/74)  BP(mean): 75 (11-21-18 @ 20:00) (56 - 87)  RR: 18 (11-21-18 @ 20:00) (13 - 29)  SpO2: 97% (11-21-18 @ 20:00) (95% - 97%)    I&O's Summary    20 Nov 2018 07:01  -  21 Nov 2018 07:00  --------------------------------------------------------  IN: 950 mL / OUT: 1350 mL / NET: -400 mL    21 Nov 2018 07:01  -  21 Nov 2018 20:49  --------------------------------------------------------  IN: 1385 mL / OUT: 1550 mL / NET: -165 mL      ====================  NEW LABS:  ====================  11-21    136  |  101  |  21  ----------------------------<  136<H>  4.3   |  20<L>  |  0.89    Ca    9.3      21 Nov 2018 05:28  Phos  4.1     11-21  Mg     2.1     11-21    TPro  6.4  /  Alb  3.7  /  TBili  0.7  /  DBili  x   /  AST  27  /  ALT  43  /  AlkPhos  53  11-21    PT/INR - ( 21 Nov 2018 05:28 )   PT: 13.3 sec;   INR: 1.15 ratio      PTT - ( 21 Nov 2018 05:28 )  PTT:27.4 sec    ====================  PLAN:  ====================  DAPT, high dose statin   Toprol XL 25mg po daily  Holding ACE due to liable BP  Volume status euvolemic   Strict I/Os, daily weights   Orthostatic BP   MVR karin Zuleta CCU NP  Beeper #[id number]  Spectra # [phone] ====================  CCU MIDNIGHT ROUNDS  ====================    HOME RAMIREZ  [id number]    Patient is a 58y old  Male who presents with a chief complaint of SOB and chest tightness (21 Nov 2018 14:14)    ====================  SUMMARY: 57 y/o male with PMH of T2DM who presented to [facility] with SOB and midsternal chest tightness. In the ED, D-Dimer was 681, lactate 6.9, BNP 1924 CT chest was negative for PE. CXR showed extensive regions of parenchymal consolidation and groundglass opacities to bilateral lung fields with small/moderate pleural effusions, lasix 40 mg IVP and duoNebs x1 were given and placed on BiPAP.  Echo also showed severe global dysfunction with elevated Trop 0.6, pt was loaded with  mg and Plavix 600 mg and transferred to [facility] for NSTEMI.    ====================    ====================  NEW EVENTS: s/p NS 500ml bolus with improvement in BP  ====================    MEDICATIONS  (STANDING):  aspirin enteric coated 81 milliGRAM(s) Oral daily  atorvastatin 80 milliGRAM(s) Oral at bedtime  chlorhexidine 4% Liquid 1 Application(s) Topical <User Schedule>  clopidogrel Tablet 75 milliGRAM(s) Oral daily  dextrose 5%. 1000 milliLiter(s) (50 mL/Hr) IV Continuous <Continuous>  dextrose 50% Injectable 12.5 Gram(s) IV Push once  dextrose 50% Injectable 25 Gram(s) IV Push once  dextrose 50% Injectable 25 Gram(s) IV Push once  heparin  Injectable 5000 Unit(s) SubCutaneous every 12 hours  influenza   Vaccine 0.5 milliLiter(s) IntraMuscular once  insulin lispro (HumaLOG) corrective regimen sliding scale   SubCutaneous at bedtime  insulin lispro (HumaLOG) corrective regimen sliding scale   SubCutaneous three times a day before meals  metoprolol succinate ER 25 milliGRAM(s) Oral daily  sodium chloride 0.9%. 500 milliLiter(s) (100 mL/Hr) IV Continuous <Continuous>    MEDICATIONS  (PRN):  dextrose 40% Gel 15 Gram(s) Oral once PRN Blood Glucose LESS THAN 70 milliGRAM(s)/deciliter  glucagon  Injectable 1 milliGRAM(s) IntraMuscular once PRN Glucose LESS THAN 70 milligrams/deciliter    ====================  VITALS (Last 12 hrs):  ====================  T(C): 36.8 (11-21-18 @ 20:00), Max: 36.8 (11-21-18 @ 12:30)  T(F): 98.2 (11-21-18 @ 20:00), Max: 98.2 (11-21-18 @ 12:30)  HR: 72 (11-21-18 @ 20:00) (64 - 86)  BP: 104/60 (11-21-18 @ 20:00) (74/50 - 109/74)  BP(mean): 75 (11-21-18 @ 20:00) (56 - 87)  RR: 18 (11-21-18 @ 20:00) (13 - 29)  SpO2: 97% (11-21-18 @ 20:00) (95% - 97%)    I&O's Summary    20 Nov 2018 07:01  -  21 Nov 2018 07:00  --------------------------------------------------------  IN: 950 mL / OUT: 1350 mL / NET: -400 mL    21 Nov 2018 07:01  -  21 Nov 2018 20:49  --------------------------------------------------------  IN: 1385 mL / OUT: 1550 mL / NET: -165 mL      ====================  NEW LABS:  ====================  11-21    136  |  101  |  21  ----------------------------<  136<H>  4.3   |  20<L>  |  0.89    Ca    9.3      21 Nov 2018 05:28  Phos  4.1     11-21  Mg     2.1     11-21    TPro  6.4  /  Alb  3.7  /  TBili  0.7  /  DBili  x   /  AST  27  /  ALT  43  /  AlkPhos  53  11-21    PT/INR - ( 21 Nov 2018 05:28 )   PT: 13.3 sec;   INR: 1.15 ratio      PTT - ( 21 Nov 2018 05:28 )  PTT:27.4 sec    ====================  PLAN:  ====================  DAPT, high dose statin   Toprol XL 25mg po daily  Holding ACE due to liable BP  Volume status euvolemic   Strict I/Os, daily weights   Orthostatic BP  Cortisol level  MVR karin Zuleta CCU NP  Beeper #[id number]  Spectra # [phone]

## 2018-11-22 LAB
ALBUMIN SERPL ELPH-MCNC: 4 G/DL — SIGNIFICANT CHANGE UP (ref 3.3–5)
ALP SERPL-CCNC: 57 U/L — SIGNIFICANT CHANGE UP (ref 40–120)
ALT FLD-CCNC: 40 U/L — SIGNIFICANT CHANGE UP (ref 10–45)
ANION GAP SERPL CALC-SCNC: 14 MMOL/L — SIGNIFICANT CHANGE UP (ref 5–17)
APTT BLD: 27.5 SEC — SIGNIFICANT CHANGE UP (ref 27.5–36.3)
AST SERPL-CCNC: 30 U/L — SIGNIFICANT CHANGE UP (ref 10–40)
BASOPHILS # BLD AUTO: 0 K/UL — SIGNIFICANT CHANGE UP (ref 0–0.2)
BASOPHILS NFR BLD AUTO: 0.3 % — SIGNIFICANT CHANGE UP (ref 0–2)
BILIRUB SERPL-MCNC: 0.8 MG/DL — SIGNIFICANT CHANGE UP (ref 0.2–1.2)
BUN SERPL-MCNC: 17 MG/DL — SIGNIFICANT CHANGE UP (ref 7–23)
CALCIUM SERPL-MCNC: 9.7 MG/DL — SIGNIFICANT CHANGE UP (ref 8.4–10.5)
CHLORIDE SERPL-SCNC: 102 MMOL/L — SIGNIFICANT CHANGE UP (ref 96–108)
CO2 SERPL-SCNC: 21 MMOL/L — LOW (ref 22–31)
CORTIS AM PEAK SERPL-MCNC: 15.6 UG/DL — SIGNIFICANT CHANGE UP (ref 6–18.4)
CREAT SERPL-MCNC: 0.87 MG/DL — SIGNIFICANT CHANGE UP (ref 0.5–1.3)
EOSINOPHIL # BLD AUTO: 0.2 K/UL — SIGNIFICANT CHANGE UP (ref 0–0.5)
EOSINOPHIL NFR BLD AUTO: 3.1 % — SIGNIFICANT CHANGE UP (ref 0–6)
GLUCOSE BLDC GLUCOMTR-MCNC: 117 MG/DL — HIGH (ref 70–99)
GLUCOSE BLDC GLUCOMTR-MCNC: 138 MG/DL — HIGH (ref 70–99)
GLUCOSE BLDC GLUCOMTR-MCNC: 149 MG/DL — HIGH (ref 70–99)
GLUCOSE BLDC GLUCOMTR-MCNC: 191 MG/DL — HIGH (ref 70–99)
GLUCOSE SERPL-MCNC: 130 MG/DL — HIGH (ref 70–99)
HCT VFR BLD CALC: 41.5 % — SIGNIFICANT CHANGE UP (ref 39–50)
HGB BLD-MCNC: 14 G/DL — SIGNIFICANT CHANGE UP (ref 13–17)
INR BLD: 1.14 RATIO — SIGNIFICANT CHANGE UP (ref 0.88–1.16)
LYMPHOCYTES # BLD AUTO: 1.7 K/UL — SIGNIFICANT CHANGE UP (ref 1–3.3)
LYMPHOCYTES # BLD AUTO: 27.8 % — SIGNIFICANT CHANGE UP (ref 13–44)
MAGNESIUM SERPL-MCNC: 2.2 MG/DL — SIGNIFICANT CHANGE UP (ref 1.6–2.6)
MCHC RBC-ENTMCNC: 29.5 PG — SIGNIFICANT CHANGE UP (ref 27–34)
MCHC RBC-ENTMCNC: 33.8 GM/DL — SIGNIFICANT CHANGE UP (ref 32–36)
MCV RBC AUTO: 87 FL — SIGNIFICANT CHANGE UP (ref 80–100)
MONOCYTES # BLD AUTO: 0.7 K/UL — SIGNIFICANT CHANGE UP (ref 0–0.9)
MONOCYTES NFR BLD AUTO: 11.2 % — SIGNIFICANT CHANGE UP (ref 2–14)
NEUTROPHILS # BLD AUTO: 3.6 K/UL — SIGNIFICANT CHANGE UP (ref 1.8–7.4)
NEUTROPHILS NFR BLD AUTO: 57.6 % — SIGNIFICANT CHANGE UP (ref 43–77)
PHOSPHATE SERPL-MCNC: 3.4 MG/DL — SIGNIFICANT CHANGE UP (ref 2.5–4.5)
PLATELET # BLD AUTO: 180 K/UL — SIGNIFICANT CHANGE UP (ref 150–400)
POTASSIUM SERPL-MCNC: 4.5 MMOL/L — SIGNIFICANT CHANGE UP (ref 3.5–5.3)
POTASSIUM SERPL-SCNC: 4.5 MMOL/L — SIGNIFICANT CHANGE UP (ref 3.5–5.3)
PROT SERPL-MCNC: 6.7 G/DL — SIGNIFICANT CHANGE UP (ref 6–8.3)
PROTHROM AB SERPL-ACNC: 13.1 SEC — HIGH (ref 10–12.9)
RBC # BLD: 4.77 M/UL — SIGNIFICANT CHANGE UP (ref 4.2–5.8)
RBC # FLD: 12.1 % — SIGNIFICANT CHANGE UP (ref 10.3–14.5)
SODIUM SERPL-SCNC: 137 MMOL/L — SIGNIFICANT CHANGE UP (ref 135–145)
WBC # BLD: 6.2 K/UL — SIGNIFICANT CHANGE UP (ref 3.8–10.5)
WBC # FLD AUTO: 6.2 K/UL — SIGNIFICANT CHANGE UP (ref 3.8–10.5)

## 2018-11-22 PROCEDURE — 93010 ELECTROCARDIOGRAM REPORT: CPT

## 2018-11-22 RX ORDER — SODIUM CHLORIDE 9 MG/ML
500 INJECTION INTRAMUSCULAR; INTRAVENOUS; SUBCUTANEOUS ONCE
Qty: 0 | Refills: 0 | Status: DISCONTINUED | OUTPATIENT
Start: 2018-11-22 | End: 2018-11-22

## 2018-11-22 RX ORDER — METOPROLOL TARTRATE 50 MG
25 TABLET ORAL DAILY
Qty: 0 | Refills: 0 | Status: DISCONTINUED | OUTPATIENT
Start: 2018-11-22 | End: 2018-11-23

## 2018-11-22 RX ORDER — LISINOPRIL 2.5 MG/1
2.5 TABLET ORAL DAILY
Qty: 0 | Refills: 0 | Status: DISCONTINUED | OUTPATIENT
Start: 2018-11-23 | End: 2018-11-23

## 2018-11-22 RX ORDER — SODIUM CHLORIDE 9 MG/ML
500 INJECTION INTRAMUSCULAR; INTRAVENOUS; SUBCUTANEOUS ONCE
Qty: 0 | Refills: 0 | Status: COMPLETED | OUTPATIENT
Start: 2018-11-22 | End: 2018-11-22

## 2018-11-22 RX ADMIN — HEPARIN SODIUM 5000 UNIT(S): 5000 INJECTION INTRAVENOUS; SUBCUTANEOUS at 18:29

## 2018-11-22 RX ADMIN — CLOPIDOGREL BISULFATE 75 MILLIGRAM(S): 75 TABLET, FILM COATED ORAL at 13:35

## 2018-11-22 RX ADMIN — Medication 1: at 13:35

## 2018-11-22 RX ADMIN — Medication 81 MILLIGRAM(S): at 13:35

## 2018-11-22 RX ADMIN — HEPARIN SODIUM 5000 UNIT(S): 5000 INJECTION INTRAVENOUS; SUBCUTANEOUS at 05:29

## 2018-11-22 RX ADMIN — CHLORHEXIDINE GLUCONATE 1 APPLICATION(S): 213 SOLUTION TOPICAL at 22:06

## 2018-11-22 RX ADMIN — Medication 25 MILLIGRAM(S): at 13:36

## 2018-11-22 RX ADMIN — SODIUM CHLORIDE 100 MILLILITER(S): 9 INJECTION INTRAMUSCULAR; INTRAVENOUS; SUBCUTANEOUS at 05:30

## 2018-11-22 RX ADMIN — ATORVASTATIN CALCIUM 80 MILLIGRAM(S): 80 TABLET, FILM COATED ORAL at 22:05

## 2018-11-22 NOTE — PROGRESS NOTE ADULT - SUBJECTIVE AND OBJECTIVE BOX
HPI / INTERVAL HISTORY:      57 y/o male with PMH of T2DM who presented to [facility] with SOB and midsternal chest tightness. Echo showed severe global dysfunction with elevated Trop 0.6, pt was loaded with  mg and Plavix 600 mg and transferred to [facility] for NSTEMI.      In ED, continued with BiPAP and pt was given lasix 40 mg x2 IVP with output of 1L and started on nitroglycerin gtt for elevated BP.  Transferred to CCU for further management. (14 Nov 2018 15:02)    TTE on 11/14 showed severe global LVSF with EF 15%. A cardiac cath on 11/15 showed stenosis of the pLAD 90%, mLCX 100%, pRCA 100%. A VERN on 11/16 showed EF 16%, severe MR, severe LA & LV enlargement, global LVSD, severe stage III DD. A viability study was performed which showed that the anterior, anteroseptal, anterolateral, apical, and mid to distal inferoseptal walls demonstrate mostly preserved perfusion on rest imaging suggestive of viable myocardium. CT surgery was consulted, who felt patient was not candidate for CABG, and decision was made for patient to undergo Impella-assisted PCI on 11/20/18. Stent x 1 placed to the pLAD (95%). Patient's course has been complicated by orthostatic hypotension.    INTERVAL HISTORY: Patient had episode of orthostatic hypotension yesterday and this morning. Patient remains clinically otherwise asymptomatic.      OBJECTIVE:  VITAL SIGNS:  ICU Vital Signs Last 24 Hrs  T(C): 36.7 (22 Nov 2018 05:00), Max: 36.8 (21 Nov 2018 12:30)  T(F): 98 (22 Nov 2018 05:00), Max: 98.2 (21 Nov 2018 12:30)  HR: 64 (22 Nov 2018 07:00) (60 - 86)  BP: 118/70 (22 Nov 2018 07:00) (74/50 - 118/70)  BP(mean): 87 (22 Nov 2018 07:00) (56 - 88)  ABP: --  ABP(mean): --  RR: 16 (22 Nov 2018 07:00) (13 - 29)  SpO2: 96% (22 Nov 2018 05:00) (95% - 97%)        11-21 @ 07:01  -  11-22 @ 07:00  --------------------------------------------------------  IN: 1725 mL / OUT: 3100 mL / NET: -1375 mL      CAPILLARY BLOOD GLUCOSE      POCT Blood Glucose.: 116 mg/dL (21 Nov 2018 21:05)      PHYSICAL EXAM:  General: WN/WD NAD  Neurology: Awake, nonfocal, [place] x 4  Eyes: Scleras clear, PERRLA/ EOMI, Gross vision intact  ENT:Gross hearing intact, grossly patent pharynx, no stridor  Neck: Neck supple, trachea midline, No JVD,   Respiratory: BS diminished at bases B/L, No wheezing, rales, rhonchi  CV: RRR, S1S2, no murmurs, rubs or gallops  Abdominal: Soft, NT, ND +BS,   Extremities: No edema, + peripheral pulses  Skin: No Rashes, Hematoma, Ecchymosis  Lymphatic: No Neck, axilla, groin LAD  Psych: Oriented x 3, normal affect    LABS:                        14.0   6.2   )-----------( 180      ( 22 Nov 2018 05:52 )             41.5     11-22    137  |  102  |  17  ----------------------------<  130<H>  4.5   |  21<L>  |  0.87    Ca    9.7      22 Nov 2018 05:52  Phos  3.4     11-22  Mg     2.2     11-22    TPro  6.7  /  Alb  4.0  /  TBili  0.8  /  DBili  x   /  AST  30  /  ALT  40  /  AlkPhos  57  11-22    LIVER FUNCTIONS - ( 22 Nov 2018 05:52 )  Alb: 4.0 g/dL / Pro: 6.7 g/dL / ALK PHOS: 57 U/L / ALT: 40 U/L / AST: 30 U/L / GGT: x           PT/INR - ( 22 Nov 2018 05:52 )   PT: 13.1 sec;   INR: 1.14 ratio         PTT - ( 22 Nov 2018 05:52 )  PTT:27.5 sec            RADIOLOGY & ADDITIONAL TESTS:       MEDICATIONS:  aspirin enteric coated 81 milliGRAM(s) Oral daily  atorvastatin 80 milliGRAM(s) Oral at bedtime  chlorhexidine 4% Liquid 1 Application(s) Topical <User Schedule>  clopidogrel Tablet 75 milliGRAM(s) Oral daily  dextrose 40% Gel 15 Gram(s) Oral once PRN  dextrose 5%. 1000 milliLiter(s) IV Continuous <Continuous>  dextrose 50% Injectable 12.5 Gram(s) IV Push once  dextrose 50% Injectable 25 Gram(s) IV Push once  dextrose 50% Injectable 25 Gram(s) IV Push once  glucagon  Injectable 1 milliGRAM(s) IntraMuscular once PRN  heparin  Injectable 5000 Unit(s) SubCutaneous every 12 hours  influenza   Vaccine 0.5 milliLiter(s) IntraMuscular once  insulin lispro (HumaLOG) corrective regimen sliding scale   SubCutaneous at bedtime  insulin lispro (HumaLOG) corrective regimen sliding scale   SubCutaneous three times a day before meals  metoprolol succinate ER 25 milliGRAM(s) Oral daily      ALLERGIES:  No Known Allergies

## 2018-11-22 NOTE — PROGRESS NOTE ADULT - ASSESSMENT
Assessment and Plan:   · Assessment	  59 yo M w/ T2DM (on oral meds) who p/w NSTEMI found to have 3vessel CAD and new HFrEF (EF 15-20%, LVEDD 6 cm) with possible moderate-severe MR, s/p Impella-Assisted PCI on 11/20/18 w/ stent x 1 to the pLAD, course complicated by orthostatic hypotension. Currently normotensive and euvolemic. Overall stage C HF, NYHA class III appears compensated and euvolemic.    Neuro  -No active issues. Normal mental status.    Cardiac  ·  Problem:  Acute systolic heart failure.  Plan: - Pt appears euvolemic at this time w/ clear breath sounds and no edema.  - continue Metoprolol Succinate ER 25mg qd; hold for MAP<65 or HR <50.  - Defer initiation of lisinopril given hypotension.  - hold on further diuretics as euvolemic; may require lasix 20 mg PO daily   - standing weights daily  - s/p Impella-Assisted PCI on 11/20/18 w/ stent x 1 placed to pLAD (95%).  - CAD: on ASA 81 mg daily, Plavix 75mg daily, Lipitor 80 mg qhs  - discussed clinical course with patient and counseled on sodium/fluid restriction  - patient will likely benefit from mitral clip for MR in future    ·  Problem: Non-rheumatic mitral regurgitation.    - CTS following for TVD w/ severe MR.      Problem/Plan - 3:  ·  Problem: NSTEMI (non-ST elevated myocardial infarction).    Plan: - CE peaked at Trop H of 1458. No chest pain  - Continue ASA, Plavix, Lipitor. Continue Metoprolol.     Pulm:  -No active issues    ID:   -No active issues.    Endo:   ·  Problem: Diabetes mellitus.  Plan: - Continue ISS.     DVT PPx: Continue Heparin sq. Assessment and Plan:   · Assessment	  59 yo M w/ T2DM (on oral meds) who p/w NSTEMI found to have 3vessel CAD and new HFrEF (EF 15-20%, LVEDD 6 cm) with possible moderate-severe MR, s/p Impella-Assisted PCI on 11/20/18 w/ stent x 1 to the pLAD, course complicated by orthostatic hypotension. Currently normotensive and euvolemic. Overall stage C HF, NYHA class III appears compensated and euvolemic.    Neuro  -No active issues. Normal mental status.    Cardiac  ·  Problem:  Acute systolic heart failure.  Plan: - Pt appears euvolemic at this time w/ clear breath sounds and no edema.  - continue Metoprolol Succinate ER 25mg qd; hold for MAP<65 or HR <50. Captopril discontinued.  - Defer initiation of lisinopril given hypotension.  - hold on further diuretics as euvolemic; may require lasix 20 mg PO daily   - standing weights daily  - s/p Impella-Assisted PCI on 11/20/18 w/ stent x 1 placed to pLAD (95%).  - CAD: on ASA 81 mg daily, Plavix 75mg daily, Lipitor 80 mg qhs  - discussed clinical course with patient and counseled on sodium/fluid restriction  - patient will likely benefit from mitral clip for MR in future    ·  Problem: Non-rheumatic mitral regurgitation.    - CTS following for TVD w/ severe MR.      Problem/Plan - 3:  ·  Problem: NSTEMI (non-ST elevated myocardial infarction).    Plan: - CE peaked at Trop H of 1458. No chest pain  - Continue ASA, Plavix, Lipitor. Continue Metoprolol.     Pulm:  -No active issues    ID:   -No active issues.    Endo:   ·  Problem: Diabetes mellitus.  Plan: - Continue ISS.     DVT PPx: Continue Heparin sq. Assessment and Plan:   · Assessment	  59 yo M w/ T2DM (on oral meds) who p/w NSTEMI found to have 3vessel CAD and new HFrEF (EF 15-20%, LVEDD 6 cm) with possible moderate-severe MR, s/p Impella-Assisted PCI on 11/20/18 w/ stent x 1 to the pLAD, course complicated by orthostatic hypotension. Currently normotensive and euvolemic. Overall stage C HF, NYHA class III appears compensated and euvolemic.    Neuro  -No active issues. Normal mental status.    Cardiac  ·  Problem:  Acute systolic heart failure.  Plan: - Pt appears euvolemic at this time w/ clear breath sounds and no edema.  - continue Metoprolol Succinate ER 25mg qd; hold for MAP<65 or HR <50. Captopril discontinued. Consider restarting low dose on 11/23.  - Defer initiation of lisinopril given hypotension.  - hold on further diuretics as euvolemic; may require lasix 20 mg PO daily   - standing weights daily  - s/p Impella-Assisted PCI on 11/20/18 w/ stent x 1 placed to pLAD (95%).  - CAD: on ASA 81 mg daily, Plavix 75mg daily, Lipitor 80 mg qhs  - discussed clinical course with patient and counseled on sodium/fluid restriction  - patient will likely benefit from mitral clip for MR in future    ·  Problem: Non-rheumatic mitral regurgitation.    - CTS following for TVD w/ severe MR.      Problem/Plan - 3:  ·  Problem: NSTEMI (non-ST elevated myocardial infarction).    Plan: - CE peaked at Trop H of 1458. No chest pain  - Continue ASA, Plavix, Lipitor. Continue Metoprolol.     Pulm:  -No active issues    ID:   -No active issues.    Endo:   ·  Problem: Diabetes mellitus.  Plan: - Continue ISS.     DVT PPx: Continue Heparin sq.

## 2018-11-22 NOTE — PROGRESS NOTE ADULT - SUBJECTIVE AND OBJECTIVE BOX
====================  CCU MIDNIGHT ROUNDS  ====================    HOME RAMIREZ  [id number]    Patient is a 58y old  Male who presents with a chief complaint of SOB and chest tightness (22 Nov 2018 07:36)    ====================  SUMMARY: 57 y/o male with PMH of T2DM who presented to [facility] with SOB and midsternal chest tightness. In the ED, D-Dimer was 681, lactate 6.9, BNP 1924 CT chest was negative for PE. CXR showed extensive regions of parenchymal consolidation and groundglass opacities to bilateral lung fields with small/moderate pleural effusions, lasix 40 mg IVP and duoNebs x1 were given and placed on BiPAP.  Echo also showed severe global dysfunction with elevated Trop 0.6, pt was loaded with  mg and Plavix 600 mg and transferred to [facility] for NSTEMI.   ====================    ====================  NEW EVENTS: Tolerating Toprol XL 25mg daily.  Patient was not started on low dose Lisinopril today due to continued liable BPs.  Patient did not require any IVF boluses today.  Ambulating - remains asymptomatic despite being orthostatic.   ====================    MEDICATIONS  (STANDING):  aspirin enteric coated 81 milliGRAM(s) Oral daily  atorvastatin 80 milliGRAM(s) Oral at bedtime  chlorhexidine 4% Liquid 1 Application(s) Topical <User Schedule>  clopidogrel Tablet 75 milliGRAM(s) Oral daily  dextrose 5%. 1000 milliLiter(s) (50 mL/Hr) IV Continuous <Continuous>  dextrose 50% Injectable 12.5 Gram(s) IV Push once  dextrose 50% Injectable 25 Gram(s) IV Push once  dextrose 50% Injectable 25 Gram(s) IV Push once  heparin  Injectable 5000 Unit(s) SubCutaneous every 12 hours  influenza   Vaccine 0.5 milliLiter(s) IntraMuscular once  insulin lispro (HumaLOG) corrective regimen sliding scale   SubCutaneous at bedtime  insulin lispro (HumaLOG) corrective regimen sliding scale   SubCutaneous three times a day before meals  metoprolol succinate ER 25 milliGRAM(s) Oral daily    MEDICATIONS  (PRN):  dextrose 40% Gel 15 Gram(s) Oral once PRN Blood Glucose LESS THAN 70 milliGRAM(s)/deciliter  glucagon  Injectable 1 milliGRAM(s) IntraMuscular once PRN Glucose LESS THAN 70 milligrams/deciliter    ====================  VITALS (Last 12 hrs):  ====================  T(C): 36.8 (11-22-18 @ 19:00), Max: 37 (11-22-18 @ 17:15)  T(F): 98.2 (11-22-18 @ 19:00), Max: 98.6 (11-22-18 @ 17:15)  HR: 72 (11-22-18 @ 22:00) (66 - 84)  BP: 115/70 (11-22-18 @ 22:00) (97/55 - 115/70)  BP(mean): 83 (11-22-18 @ 22:00) (65 - 85)  RR: 15 (11-22-18 @ 22:00) (15 - 20)  SpO2: 99% (11-22-18 @ 19:00) (97% - 99%)    I&O's Summary    21 Nov 2018 07:01  -  22 Nov 2018 07:00  --------------------------------------------------------  IN: 1725 mL / OUT: 3100 mL / NET: -1375 mL    22 Nov 2018 07:01  -  22 Nov 2018 22:16  --------------------------------------------------------  IN: 120 mL / OUT: 825 mL / NET: -705 mL      ====================  NEW LABS:  ====================  11-22    137  |  102  |  17  ----------------------------<  130<H>  4.5   |  21<L>  |  0.87    Ca    9.7      22 Nov 2018 05:52  Phos  3.4     11-22  Mg     2.2     11-22    TPro  6.7  /  Alb  4.0  /  TBili  0.8  /  DBili  x   /  AST  30  /  ALT  40  /  AlkPhos  57  11-22    PT/INR - ( 22 Nov 2018 05:52 )   PT: 13.1 sec;   INR: 1.14 ratio      PTT - ( 22 Nov 2018 05:52 )  PTT:27.5 sec    ====================  PLAN:  ====================  DAPT, high dose statin   Toprol XL 25mg po daily  Add low dose ACE; monitor BP  MVR eval likely will need clip  Volume status euvolemic   Strict I/Os, daily weights   Cortisol level wn    Mignon Zuleta CCU NP  Beeper #[id number]  Spectra # [phone]

## 2018-11-23 ENCOUNTER — TRANSCRIPTION ENCOUNTER (OUTPATIENT)
Age: 58
End: 2018-11-23

## 2018-11-23 VITALS
SYSTOLIC BLOOD PRESSURE: 92 MMHG | RESPIRATION RATE: 17 BRPM | DIASTOLIC BLOOD PRESSURE: 61 MMHG | OXYGEN SATURATION: 98 % | TEMPERATURE: 98 F | HEART RATE: 72 BPM

## 2018-11-23 PROBLEM — Z00.00 ENCOUNTER FOR PREVENTIVE HEALTH EXAMINATION: Status: ACTIVE | Noted: 2018-11-23

## 2018-11-23 LAB
ALBUMIN SERPL ELPH-MCNC: 4.1 G/DL — SIGNIFICANT CHANGE UP (ref 3.3–5)
ALP SERPL-CCNC: 56 U/L — SIGNIFICANT CHANGE UP (ref 40–120)
ALT FLD-CCNC: 36 U/L — SIGNIFICANT CHANGE UP (ref 10–45)
ANION GAP SERPL CALC-SCNC: 13 MMOL/L — SIGNIFICANT CHANGE UP (ref 5–17)
AST SERPL-CCNC: 25 U/L — SIGNIFICANT CHANGE UP (ref 10–40)
BASOPHILS # BLD AUTO: 0 K/UL — SIGNIFICANT CHANGE UP (ref 0–0.2)
BASOPHILS NFR BLD AUTO: 0.4 % — SIGNIFICANT CHANGE UP (ref 0–2)
BILIRUB SERPL-MCNC: 0.5 MG/DL — SIGNIFICANT CHANGE UP (ref 0.2–1.2)
BUN SERPL-MCNC: 18 MG/DL — SIGNIFICANT CHANGE UP (ref 7–23)
CALCIUM SERPL-MCNC: 9.5 MG/DL — SIGNIFICANT CHANGE UP (ref 8.4–10.5)
CHLORIDE SERPL-SCNC: 102 MMOL/L — SIGNIFICANT CHANGE UP (ref 96–108)
CO2 SERPL-SCNC: 23 MMOL/L — SIGNIFICANT CHANGE UP (ref 22–31)
CREAT SERPL-MCNC: 0.85 MG/DL — SIGNIFICANT CHANGE UP (ref 0.5–1.3)
EOSINOPHIL # BLD AUTO: 0.2 K/UL — SIGNIFICANT CHANGE UP (ref 0–0.5)
EOSINOPHIL NFR BLD AUTO: 3.2 % — SIGNIFICANT CHANGE UP (ref 0–6)
GLUCOSE BLDC GLUCOMTR-MCNC: 119 MG/DL — HIGH (ref 70–99)
GLUCOSE BLDC GLUCOMTR-MCNC: 136 MG/DL — HIGH (ref 70–99)
GLUCOSE SERPL-MCNC: 121 MG/DL — HIGH (ref 70–99)
HCT VFR BLD CALC: 38.4 % — LOW (ref 39–50)
HGB BLD-MCNC: 13.3 G/DL — SIGNIFICANT CHANGE UP (ref 13–17)
LYMPHOCYTES # BLD AUTO: 1.7 K/UL — SIGNIFICANT CHANGE UP (ref 1–3.3)
LYMPHOCYTES # BLD AUTO: 30 % — SIGNIFICANT CHANGE UP (ref 13–44)
MAGNESIUM SERPL-MCNC: 2.1 MG/DL — SIGNIFICANT CHANGE UP (ref 1.6–2.6)
MCHC RBC-ENTMCNC: 30.1 PG — SIGNIFICANT CHANGE UP (ref 27–34)
MCHC RBC-ENTMCNC: 34.6 GM/DL — SIGNIFICANT CHANGE UP (ref 32–36)
MCV RBC AUTO: 87 FL — SIGNIFICANT CHANGE UP (ref 80–100)
MONOCYTES # BLD AUTO: 0.7 K/UL — SIGNIFICANT CHANGE UP (ref 0–0.9)
MONOCYTES NFR BLD AUTO: 12.6 % — SIGNIFICANT CHANGE UP (ref 2–14)
NEUTROPHILS # BLD AUTO: 3.1 K/UL — SIGNIFICANT CHANGE UP (ref 1.8–7.4)
NEUTROPHILS NFR BLD AUTO: 53.8 % — SIGNIFICANT CHANGE UP (ref 43–77)
PHOSPHATE SERPL-MCNC: 3.9 MG/DL — SIGNIFICANT CHANGE UP (ref 2.5–4.5)
PLATELET # BLD AUTO: 179 K/UL — SIGNIFICANT CHANGE UP (ref 150–400)
POTASSIUM SERPL-MCNC: 4.2 MMOL/L — SIGNIFICANT CHANGE UP (ref 3.5–5.3)
POTASSIUM SERPL-SCNC: 4.2 MMOL/L — SIGNIFICANT CHANGE UP (ref 3.5–5.3)
PROT SERPL-MCNC: 6.5 G/DL — SIGNIFICANT CHANGE UP (ref 6–8.3)
RBC # BLD: 4.42 M/UL — SIGNIFICANT CHANGE UP (ref 4.2–5.8)
RBC # FLD: 12.7 % — SIGNIFICANT CHANGE UP (ref 10.3–14.5)
SODIUM SERPL-SCNC: 138 MMOL/L — SIGNIFICANT CHANGE UP (ref 135–145)
WBC # BLD: 5.7 K/UL — SIGNIFICANT CHANGE UP (ref 3.8–10.5)
WBC # FLD AUTO: 5.7 K/UL — SIGNIFICANT CHANGE UP (ref 3.8–10.5)

## 2018-11-23 PROCEDURE — C1887: CPT

## 2018-11-23 PROCEDURE — 85610 PROTHROMBIN TIME: CPT

## 2018-11-23 PROCEDURE — 94660 CPAP INITIATION&MGMT: CPT

## 2018-11-23 PROCEDURE — 86850 RBC ANTIBODY SCREEN: CPT

## 2018-11-23 PROCEDURE — 94640 AIRWAY INHALATION TREATMENT: CPT

## 2018-11-23 PROCEDURE — 99153 MOD SED SAME PHYS/QHP EA: CPT

## 2018-11-23 PROCEDURE — 93306 TTE W/DOPPLER COMPLETE: CPT

## 2018-11-23 PROCEDURE — 76937 US GUIDE VASCULAR ACCESS: CPT

## 2018-11-23 PROCEDURE — A9505: CPT

## 2018-11-23 PROCEDURE — 93005 ELECTROCARDIOGRAM TRACING: CPT

## 2018-11-23 PROCEDURE — 86901 BLOOD TYPING SEROLOGIC RH(D): CPT

## 2018-11-23 PROCEDURE — 76376 3D RENDER W/INTRP POSTPROCES: CPT

## 2018-11-23 PROCEDURE — 83735 ASSAY OF MAGNESIUM: CPT

## 2018-11-23 PROCEDURE — 93312 ECHO TRANSESOPHAGEAL: CPT

## 2018-11-23 PROCEDURE — 84484 ASSAY OF TROPONIN QUANT: CPT

## 2018-11-23 PROCEDURE — 83605 ASSAY OF LACTIC ACID: CPT

## 2018-11-23 PROCEDURE — 84295 ASSAY OF SERUM SODIUM: CPT

## 2018-11-23 PROCEDURE — 82962 GLUCOSE BLOOD TEST: CPT

## 2018-11-23 PROCEDURE — 80048 BASIC METABOLIC PNL TOTAL CA: CPT

## 2018-11-23 PROCEDURE — 82330 ASSAY OF CALCIUM: CPT

## 2018-11-23 PROCEDURE — C9606: CPT | Mod: LD

## 2018-11-23 PROCEDURE — 85730 THROMBOPLASTIN TIME PARTIAL: CPT

## 2018-11-23 PROCEDURE — 82435 ASSAY OF BLOOD CHLORIDE: CPT

## 2018-11-23 PROCEDURE — 99238 HOSP IP/OBS DSCHRG MGMT 30/<: CPT

## 2018-11-23 PROCEDURE — 93460 R&L HRT ART/VENTRICLE ANGIO: CPT

## 2018-11-23 PROCEDURE — 82533 TOTAL CORTISOL: CPT

## 2018-11-23 PROCEDURE — 84443 ASSAY THYROID STIM HORMONE: CPT

## 2018-11-23 PROCEDURE — C1894: CPT

## 2018-11-23 PROCEDURE — 93308 TTE F-UP OR LMTD: CPT

## 2018-11-23 PROCEDURE — 99152 MOD SED SAME PHYS/QHP 5/>YRS: CPT

## 2018-11-23 PROCEDURE — 84100 ASSAY OF PHOSPHORUS: CPT

## 2018-11-23 PROCEDURE — 85014 HEMATOCRIT: CPT

## 2018-11-23 PROCEDURE — 85027 COMPLETE CBC AUTOMATED: CPT

## 2018-11-23 PROCEDURE — 82553 CREATINE MB FRACTION: CPT

## 2018-11-23 PROCEDURE — 93325 DOPPLER ECHO COLOR FLOW MAPG: CPT

## 2018-11-23 PROCEDURE — 78452 HT MUSCLE IMAGE SPECT MULT: CPT

## 2018-11-23 PROCEDURE — 82803 BLOOD GASES ANY COMBINATION: CPT

## 2018-11-23 PROCEDURE — C1760: CPT

## 2018-11-23 PROCEDURE — 80053 COMPREHEN METABOLIC PANEL: CPT

## 2018-11-23 PROCEDURE — 82550 ASSAY OF CK (CPK): CPT

## 2018-11-23 PROCEDURE — 83036 HEMOGLOBIN GLYCOSYLATED A1C: CPT

## 2018-11-23 PROCEDURE — 93321 DOPPLER ECHO F-UP/LMTD STD: CPT

## 2018-11-23 PROCEDURE — 93320 DOPPLER ECHO COMPLETE: CPT

## 2018-11-23 PROCEDURE — C1889: CPT

## 2018-11-23 PROCEDURE — C1769: CPT

## 2018-11-23 PROCEDURE — C1874: CPT

## 2018-11-23 PROCEDURE — 71045 X-RAY EXAM CHEST 1 VIEW: CPT

## 2018-11-23 PROCEDURE — 82947 ASSAY GLUCOSE BLOOD QUANT: CPT

## 2018-11-23 PROCEDURE — 80061 LIPID PANEL: CPT

## 2018-11-23 PROCEDURE — 33990 INSJ PERQ VAD L HRT ARTERIAL: CPT

## 2018-11-23 PROCEDURE — 86900 BLOOD TYPING SEROLOGIC ABO: CPT

## 2018-11-23 PROCEDURE — 84132 ASSAY OF SERUM POTASSIUM: CPT

## 2018-11-23 PROCEDURE — 74176 CT ABD & PELVIS W/O CONTRAST: CPT

## 2018-11-23 RX ORDER — ATORVASTATIN CALCIUM 80 MG/1
1 TABLET, FILM COATED ORAL
Qty: 30 | Refills: 0
Start: 2018-11-23 | End: 2018-12-22

## 2018-11-23 RX ORDER — ATORVASTATIN CALCIUM 80 MG/1
1 TABLET, FILM COATED ORAL
Qty: 0 | Refills: 0 | COMMUNITY
Start: 2018-11-23

## 2018-11-23 RX ORDER — METOPROLOL TARTRATE 50 MG
2 TABLET ORAL
Qty: 0 | Refills: 0 | DISCHARGE
Start: 2018-11-23 | End: 2018-12-22

## 2018-11-23 RX ORDER — METFORMIN HYDROCHLORIDE 850 MG/1
1 TABLET ORAL
Qty: 60 | Refills: 0 | OUTPATIENT
Start: 2018-11-23 | End: 2018-12-22

## 2018-11-23 RX ORDER — METFORMIN HYDROCHLORIDE 850 MG/1
1 TABLET ORAL
Qty: 0 | Refills: 0 | COMMUNITY

## 2018-11-23 RX ORDER — CLOPIDOGREL BISULFATE 75 MG/1
1 TABLET, FILM COATED ORAL
Qty: 0 | Refills: 0 | COMMUNITY
Start: 2018-11-23

## 2018-11-23 RX ORDER — ASPIRIN/CALCIUM CARB/MAGNESIUM 324 MG
1 TABLET ORAL
Qty: 0 | Refills: 0 | COMMUNITY
Start: 2018-11-23

## 2018-11-23 RX ORDER — METOPROLOL TARTRATE 50 MG
1 TABLET ORAL
Qty: 0 | Refills: 0 | COMMUNITY
Start: 2018-11-23

## 2018-11-23 RX ORDER — METOPROLOL TARTRATE 50 MG
1 TABLET ORAL
Qty: 30 | Refills: 0 | OUTPATIENT
Start: 2018-11-23 | End: 2018-12-22

## 2018-11-23 RX ORDER — LISINOPRIL 2.5 MG/1
1 TABLET ORAL
Qty: 0 | Refills: 0 | COMMUNITY
Start: 2018-11-23

## 2018-11-23 RX ORDER — LISINOPRIL 2.5 MG/1
1 TABLET ORAL
Qty: 30 | Refills: 0 | OUTPATIENT
Start: 2018-11-23 | End: 2018-12-22

## 2018-11-23 RX ORDER — HEPARIN SODIUM 5000 [USP'U]/ML
5000 INJECTION INTRAVENOUS; SUBCUTANEOUS EVERY 8 HOURS
Qty: 0 | Refills: 0 | Status: DISCONTINUED | OUTPATIENT
Start: 2018-11-23 | End: 2018-11-23

## 2018-11-23 RX ORDER — CLOPIDOGREL BISULFATE 75 MG/1
1 TABLET, FILM COATED ORAL
Qty: 30 | Refills: 0
Start: 2018-11-23 | End: 2018-12-22

## 2018-11-23 RX ORDER — ASPIRIN/CALCIUM CARB/MAGNESIUM 324 MG
1 TABLET ORAL
Qty: 30 | Refills: 0
Start: 2018-11-23 | End: 2018-12-22

## 2018-11-23 RX ADMIN — Medication 81 MILLIGRAM(S): at 09:19

## 2018-11-23 RX ADMIN — CLOPIDOGREL BISULFATE 75 MILLIGRAM(S): 75 TABLET, FILM COATED ORAL at 09:19

## 2018-11-23 RX ADMIN — LISINOPRIL 2.5 MILLIGRAM(S): 2.5 TABLET ORAL at 09:18

## 2018-11-23 RX ADMIN — HEPARIN SODIUM 5000 UNIT(S): 5000 INJECTION INTRAVENOUS; SUBCUTANEOUS at 05:59

## 2018-11-23 RX ADMIN — Medication 25 MILLIGRAM(S): at 05:59

## 2018-11-23 NOTE — DISCHARGE NOTE ADULT - HOME CARE AGENCY
[facility] [phone]  A visiting nurse will contact you 1-2 days after discharge to scheduled first visit

## 2018-11-23 NOTE — PROGRESS NOTE ADULT - ATTENDING COMMENTS
Mr Jefferson here with ADHF    Active issues  ADHF  ICM, HFrEF, STage C, NYHa III, LVEDD 6cm, EF 15%  m-sMR on Lv gram and VERN    doing well ambulating, planning to go home today  he understands the need for follow up, I have explained to him that we will need to monitor his mitral valve in the event regurgitation doesn't improve after pci  his medication uptitration has been limited by symptomatic hypotension, will need close follow up for uptitration    - will need uptitration of meds as tolerated, and close follow up   - for HF follow up please call  for NP clinic appt within 1 week of discharge  thanks for the consult
Mr Jefferson here with ADHF    Active issues  ADHF  ICM, HFrEF, STage C, NYHa IV, LVEDD 6cm, EF 15%  m-sMR on Lv gram and VERN    for high risk pci +/- Mechanical support  I have discussed with him that his MR will need to be reassessed and potentially intervened on once he recovers from procedure   he verbalized understanding      - switch lopressor to succinate  - off diuretics
Mr Jefferson here with ADHF    Active issues  ADHF  ICM, HFrEF, STage C, NYHa IV, LVEDD 6cm, EF 15%  m-sMR on Lv gram and VERN    underwent high risk pci yesterday of his lad, w transient impella, tolerated procedure well impella removed at end of procedure, had episode of hypotension likely related to vagal tone    - stop captopril - switch to lisinopril  - continue metoprolol  - hemodynamically stable  - will need uptitration of meds as tolerated, and close follow up with us  - for Hf follow up please call  for NP clinic appt within 1 week of discharge
Patient is seen and examined with fellow, NP and the CCU house-staff. I agree with the history, physical and the assessment and plan.  awaiting a high risk PCI
Patient is seen and examined with fellow, NP and the CCU house-staff. I agree with the history, physical and the assessment and plan.  tolerated the lisinopril this AM  on DAPT  will re-evaluate his mitral regurgitation as out-pt
Patient is seen and examined with fellow, NP and the CCU house-staff. I agree with the history, physical and the assessment and plan.  viability results noted  CTS evaluyation pending  f/u with HF team  c/w curernt medical managment
Mr Ronny here with ADHF    Active issues  ADHF  ICM, HFrEF, STage C, NYHa IV, LVEDD 6cm, EF 15%  m-sMR on Lv gram and VERN    underwent viability study today not amenable to CABG, will pursue PCI with potential temporary support, additional he could benefit from mitral clip if MR does not improve    - switch captopril to lisinopril  - switch lopressor to succinate  - off diuretics
Patient is seen and examined with fellow, NP and the CCU house-staff. I agree with the history, physical and the assessment and plan.  s/p high risk PCI - with hypotension post the procedure in setting of vasovagal reaction  will decrease the high dose of capropril  c/w dAPT and low dose BB  MR will be re-assessed as out-pt
Patient with left heart cath showing severe three vessel CAD and more MR than was appreciated on TTE.  Plan for VERN to evaluate mitral valve prior to deciding on revascularization.
Patient is seen and examined with fellow, NP and the CCU house-staff. I agree with the history, physical and the assessment and plan.    Toprol XL 25 mg po daily at this time. Will observe on this dose for 24 hours before restarting ACEi given persistent hypotension issues before.  Likely can start tomorrow low dose.      Continue DAPT, statin
Patient is seen and examined with fellow, NP and the CCU house-staff. I agree with the history, physical and the assessment and plan.  viability study pending  will add low dose BB  increase the captopril  recheck his TTE to re-eval the MR
Patient is seen and examined with fellow, NP and the CCU house-staff. I agree with the history, physical and the assessment and plan.  NSTEMI s/p cath with 3 V CAD  increase the captopril  TTE to re-assess the MR  second part of viability tomorrow
Patient with severe ischemic cardiomyopathy and three vessel CAD.  VERN confirms severe MR.    Will consult CT surgery for CABG, MVR evaluation.

## 2018-11-23 NOTE — DISCHARGE NOTE ADULT - ADDITIONAL INSTRUCTIONS
Please wear your life vest at all times. Please take all of your medications as prescribed. Please follow up with your cardiologist as soon as possible, and obtain an echocardiogram.

## 2018-11-23 NOTE — DISCHARGE NOTE ADULT - HOSPITAL COURSE
59 yo M w/ T2DM (on oral meds) who p/w NSTEMI found to have triple vessal CAD and new HFrEF (EF 15-20%, LVEDD 6 cm) with possible moderate-severe MR, s/p Impella-Assisted PCI on 11/20/18 w/ stent x 1 to the pLAD, course complicated by orthostatic hypotension. Currently normotensive and euvolemic. Overall stage C HF, NYHA class III appears compensated and euvolemic.    Hospital Course: Patient initially presented to [facility] with SOB and midsternal chest tightness. Echo there showed severe global dysfunction with elevated Trop 0.6, pt was loaded with  mg and Plavix 600 mg and transferred to [facility] for NSTEMI.      In ED, patient continued with BiPAP started on nitroglycerin gtt for elevated BP.  Transferred to CCU for further management. (14 Nov 2018 15:02)    TTE on 11/14 showed severe global LVSF with EF 15%. A cardiac cath on 11/15 showed stenosis of the pLAD 90%, mLCX 100%, pRCA 100%. A VERN on 11/16 showed EF 16%, severe MR, severe LA & LV enlargement, global LVSD, severe stage III DD. A viability study was performed which showed that the anterior, anteroseptal, anterolateral, apical, and mid to distal inferoseptal walls demonstrate mostly preserved perfusion on rest imaging suggestive of viable myocardium. CT surgery was consulted, who felt patient was not candidate for CABG, and decision was made for patient to undergo Impella-assisted PCI on 11/20/18. Stent x 1 placed to the pLAD (95%). Patient's course was complicated by orthostatic hypotension. His blood pressure improved, and he was discharged on ACE-inhibitor, beta blocker, statin, and dual anti-platelet therapy.

## 2018-11-23 NOTE — PROGRESS NOTE ADULT - PROVIDER SPECIALTY LIST ADULT
CCU
Heart Failure
CCU

## 2018-11-23 NOTE — PROGRESS NOTE ADULT - REASON FOR ADMISSION
NSTEMI
NSTEMI
SOB and chest tightness

## 2018-11-23 NOTE — PROGRESS NOTE ADULT - SUBJECTIVE AND OBJECTIVE BOX
HPI / INTERVAL HISTORY:      59 y/o male with PMH of T2DM who presented to [facility] with SOB and midsternal chest tightness. Echo showed severe global dysfunction with elevated Trop 0.6, pt was loaded with  mg and Plavix 600 mg and transferred to [facility] for NSTEMI.      In ED, patient continued with BiPAP started on nitroglycerin gtt for elevated BP.  Transferred to CCU for further management. (14 Nov 2018 15:02)    TTE on 11/14 showed severe global LVSF with EF 15%. A cardiac cath on 11/15 showed stenosis of the pLAD 90%, mLCX 100%, pRCA 100%. A VERN on 11/16 showed EF 16%, severe MR, severe LA & LV enlargement, global LVSD, severe stage III DD. A viability study was performed which showed that the anterior, anteroseptal, anterolateral, apical, and mid to distal inferoseptal walls demonstrate mostly preserved perfusion on rest imaging suggestive of viable myocardium. CT surgery was consulted, who felt patient was not candidate for CABG, and decision was made for patient to undergo Impella-assisted PCI on 11/20/18. Stent x 1 placed to the pLAD (95%). Patient's course has been complicated by orthostatic hypotension.    INTERVAL HISTORY: Patient remains clinically asymptomatic. Low BPs overnight.    OBJECTIVE:  VITAL SIGNS:  ICU Vital Signs Last 24 Hrs  T(C): 36.7 (23 Nov 2018 05:00), Max: 37 (22 Nov 2018 17:15)  T(F): 98 (23 Nov 2018 05:00), Max: 98.6 (22 Nov 2018 17:15)  HR: 62 (23 Nov 2018 07:00) (58 - 84)  BP: 98/66 (23 Nov 2018 07:00) (82/54 - 115/78)  BP(mean): 76 (23 Nov 2018 07:00) (63 - 88)  ABP: --  ABP(mean): --  RR: 16 (23 Nov 2018 07:00) (15 - 20)  SpO2: 99% (22 Nov 2018 19:00) (97% - 99%)        11-22 @ 07:01  -  11-23 @ 07:00  --------------------------------------------------------  IN: 360 mL / OUT: 1845 mL / NET: -1485 mL      CAPILLARY BLOOD GLUCOSE      POCT Blood Glucose.: 149 mg/dL (22 Nov 2018 22:07)      PHYSICAL EXAM:  General: WN/WD NAD  Neurology: Awake, nonfocal, [place] x 4  Eyes: Scleras clear, PERRLA/ EOMI, Gross vision intact  ENT:Gross hearing intact, grossly patent pharynx, no stridor  Neck: Neck supple, trachea midline, No JVD,   Respiratory: BS diminished at bases B/L, No wheezing, rales, rhonchi  CV: RRR, S1S2, no murmurs, rubs or gallops  Abdominal: Soft, NT, ND +BS,   Extremities: No edema, + peripheral pulses  Skin: No Rashes, Hematoma, Ecchymosis  Lymphatic: No Neck, axilla, groin LAD  Psych: Oriented x 3, normal affect    LABS:                        13.3   5.7   )-----------( 179      ( 23 Nov 2018 01:22 )             38.4     11-23    138  |  102  |  18  ----------------------------<  121<H>  4.2   |  23  |  0.85    Ca    9.5      23 Nov 2018 01:22  Phos  3.9     11-23  Mg     2.1     11-23    TPro  6.5  /  Alb  4.1  /  TBili  0.5  /  DBili  x   /  AST  25  /  ALT  36  /  AlkPhos  56  11-23    LIVER FUNCTIONS - ( 23 Nov 2018 01:22 )  Alb: 4.1 g/dL / Pro: 6.5 g/dL / ALK PHOS: 56 U/L / ALT: 36 U/L / AST: 25 U/L / GGT: x           PT/INR - ( 22 Nov 2018 05:52 )   PT: 13.1 sec;   INR: 1.14 ratio         PTT - ( 22 Nov 2018 05:52 )  PTT:27.5 sec            RADIOLOGY & ADDITIONAL TESTS:       MEDICATIONS:  aspirin enteric coated 81 milliGRAM(s) Oral daily  atorvastatin 80 milliGRAM(s) Oral at bedtime  chlorhexidine 4% Liquid 1 Application(s) Topical <User Schedule>  clopidogrel Tablet 75 milliGRAM(s) Oral daily  dextrose 40% Gel 15 Gram(s) Oral once PRN  dextrose 5%. 1000 milliLiter(s) IV Continuous <Continuous>  dextrose 50% Injectable 12.5 Gram(s) IV Push once  dextrose 50% Injectable 25 Gram(s) IV Push once  dextrose 50% Injectable 25 Gram(s) IV Push once  glucagon  Injectable 1 milliGRAM(s) IntraMuscular once PRN  heparin  Injectable 5000 Unit(s) SubCutaneous every 12 hours  influenza   Vaccine 0.5 milliLiter(s) IntraMuscular once  insulin lispro (HumaLOG) corrective regimen sliding scale   SubCutaneous at bedtime  insulin lispro (HumaLOG) corrective regimen sliding scale   SubCutaneous three times a day before meals  lisinopril 2.5 milliGRAM(s) Oral daily  metoprolol succinate ER 25 milliGRAM(s) Oral daily      ALLERGIES:  No Known Allergies

## 2018-11-23 NOTE — PROGRESS NOTE ADULT - ASSESSMENT
57 y/o M w/ h/o DM (on oral meds) who presented with acute SOB and chest tightness found to have 3vessel CAD and new HFrEF (EF 15-20%, LVEDD 6 cm) with possible moderate-severe MR. Currently normotensive and euvolemic. Labs reviewed. Overall stage C HF, NYHA class III appears compensated and euvolemic.  - continue lisinopril 2.5 mg  - hold on further diuretics as euvolemic; may require lasix 20 mg PO daily   - standing weights daily  - continue metop 25 mg  - CAD: on ASA 81 mg daily, lipitor 80 mg qhs  - discussed clinical course with patient and counseled on sodium/fluid restriction    F/U appt on 12/5 at 1:00 PM with HF clinic    Kal Cardona MD

## 2018-11-23 NOTE — DISCHARGE NOTE ADULT - PLAN OF CARE
Stay chest pain free, follow up with cardiologist. You came to the hospital with chest tightness and shortness of breath. You were found to have non-ST elevation myocardial infarction. You were treated with PCI and stenting, and you were also started on medical therapy. You must follow up with a cardiologist as soon as possible, and you must obtain an echocardiogram for further evaluation of your heart function, specifically the mitral valve. You must wear your life vest at all times, and if it detects an abnormal rhythm, you will feel a shock. Please weigh yourself daily, and eat a low-salt diet. Please restrict heavy fluid intake. Please monitor your blood pressure closely. If it is low, please hold your lisinopril and metoprolol, and see your cardiologist immediately. Control blood sugars, maintain low HgbA1c. Please continue taking your metformin as prescribed, and follow up with your primary doctor as soon as possible. Please eat a low carb, low salt diet.

## 2018-11-23 NOTE — PROGRESS NOTE ADULT - SUBJECTIVE AND OBJECTIVE BOX
Cardiology Progress Note    Interval events: No acute events overnight. No complaints.    Medications:  aspirin enteric coated 81 milliGRAM(s) Oral daily  atorvastatin 80 milliGRAM(s) Oral at bedtime  chlorhexidine 4% Liquid 1 Application(s) Topical <User Schedule>  clopidogrel Tablet 75 milliGRAM(s) Oral daily  dextrose 40% Gel 15 Gram(s) Oral once PRN  dextrose 5%. 1000 milliLiter(s) IV Continuous <Continuous>  dextrose 50% Injectable 12.5 Gram(s) IV Push once  dextrose 50% Injectable 25 Gram(s) IV Push once  dextrose 50% Injectable 25 Gram(s) IV Push once  glucagon  Injectable 1 milliGRAM(s) IntraMuscular once PRN  heparin  Injectable 5000 Unit(s) SubCutaneous every 8 hours  influenza   Vaccine 0.5 milliLiter(s) IntraMuscular once  insulin lispro (HumaLOG) corrective regimen sliding scale   SubCutaneous at bedtime  insulin lispro (HumaLOG) corrective regimen sliding scale   SubCutaneous three times a day before meals  lisinopril 2.5 milliGRAM(s) Oral daily  metoprolol succinate ER 25 milliGRAM(s) Oral daily      Review of Systems:  Constitutional: [ ] Fever [ ] Chills [ ] Fatigue [ ] Weight change   HEENT: [ ] Blurred vision [ ] Eye Pain [ ] Headache [ ] Runny nose [ ] Sore Throat   Respiratory: [ ] Cough [ ] Wheezing [ ] Shortness of breath  Cardiovascular: [ ] Chest Pain [ ] Palpitations [ ] ARAIZA [ ] PND [ ] Orthopnea  Gastrointestinal: [ ] Abdominal Pain [ ] Diarrhea [ ] Constipation [ ] Hemorrhoids [ ] Nausea [ ] Vomiting  Genitourinary: [ ] Nocturia [ ] Dysuria [ ] Incontinence  Extremities: [ ] Swelling [ ] Joint Pain  Neurologic: [ ] Focal deficit [ ] Paresthesias [ ] Syncope  Lymphatic: [ ] Swelling [ ] Lymphadenopathy   Skin: [ ] Rash [ ] Ecchymoses [ ] Wounds [ ] Lesions  Psychiatry: [ ] Depression [ ] Suicidal/Homicidal Ideation [ ] Anxiety [ ] Sleep Disturbances  [x] 10 point review of systems is otherwise negative except as mentioned above            [ ]Unable to obtain    Vitals:  T(C): 36.8 (18 @ 07:00), Max: 37 (18 @ 17:15)  HR: 68 (18 @ 12:00) (58 - 84)  BP: 104/67 (18 @ 12:00) (82/54 - 115/78)  BP(mean): 78 (18 @ 12:00) (63 - 88)  RR: 16 (18 @ 12:00) (15 - 17)  SpO2: 98% (18 @ 12:00) (97% - 99%)  Wt(kg): --  Daily     Daily Weight in k.3 (2018 06:00)  I&O's Summary    2018 07:01  -  2018 07:00  --------------------------------------------------------  IN: 360 mL / OUT: 1845 mL / NET: -1485 mL        Physical Exam:  NAD  PERRL, EOMI  Normal oral [place] NC/AT  S1, S2, RRR, No m/r/g appreciated, No edema, no elevation in JVP  Clear to auscultation bilaterally  Soft, Non-tender, Non-distended, BS+  No clubbing, No joint deformity   Non-focal  No lymphadenopathy  AAOx3, Mood & affect appropriate  No rashes, No ecchymoses, No cyanosis  Procedural Access Site: R groin ecchymosis, no hematoma, Non-tender to palpation, 2+ pulse , No bruit; L groin cdi    Labs:                        13.3   5.7   )-----------( 179      ( 2018 01:22 )             38.4         138  |  102  |  18  ----------------------------<  121<H>  4.2   |  23  |  0.85    Ca    9.5      2018 01:22  Phos  3.9       Mg     2.1         TPro  6.5  /  Alb  4.1  /  TBili  0.5  /  DBili  x   /  AST  25  /  ALT  36  /  AlkPhos  56      PT/INR - ( 2018 05:52 )   PT: 13.1 sec;   INR: 1.14 ratio         PTT - ( 2018 05:52 )  PTT:27.5 sec              New results/imaging:

## 2018-11-23 NOTE — PROGRESS NOTE ADULT - ASSESSMENT
Assessment and Plan:   · Assessment	  59 yo M w/ T2DM (on oral meds) who p/w NSTEMI found to have 3vessel CAD and new HFrEF (EF 15-20%, LVEDD 6 cm) with possible moderate-severe MR, s/p Impella-Assisted PCI on 11/20/18 w/ stent x 1 to the pLAD, course complicated by orthostatic hypotension. Currently normotensive and euvolemic. Overall stage C HF, NYHA class III appears compensated and euvolemic.    Neuro  -No active issues. Normal mental status.    Cardiac  ·  Problem:  Acute systolic heart failure.  Plan: - Pt appears euvolemic at this time w/ clear breath sounds and no edema.  - continue Metoprolol Succinate ER 25mg qd; hold for MAP<65 or HR <50. Captopril discontinued. Defer initiation given hypotension.   - Consider restarting low dose lisinopril pending blood pressure tolerance.  - hold on further diuretics as euvolemic; may require lasix 20 mg PO daily   - standing weights daily  - s/p Impella-Assisted PCI on 11/20/18 w/ stent x 1 placed to pLAD (95%).  - CAD: on ASA 81 mg daily, Plavix 75mg daily, Lipitor 80 mg qhs  - discussed clinical course with patient and counseled on sodium/fluid restriction  - patient will likely benefit from mitral clip for MR in future    ·  Problem: Non-rheumatic mitral regurgitation.    - CTS following for TVD w/ severe MR.      Problem/Plan - 3:  ·  Problem: NSTEMI (non-ST elevated myocardial infarction).    Plan: - CE peaked at Trop H of 1458. No chest pain  - Continue ASA, Plavix, Lipitor. Continue Metoprolol.     Pulm:  -No active issues    ID:   -No active issues.    Endo:   ·  Problem: Diabetes mellitus.  Plan: - Continue ISS.     DVT PPx: Continue Heparin sq.

## 2018-11-23 NOTE — DISCHARGE NOTE ADULT - NSFTFAOTHERFT_GEN_ALL_CORE
Patient does not need assistance leaving home, but requires observation following hospital discharge due to severely decreased heart function.

## 2018-11-23 NOTE — DISCHARGE NOTE ADULT - CARE PLAN
Principal Discharge DX:	NSTEMI (non-ST elevated myocardial infarction)  Goal:	Stay chest pain free, follow up with cardiologist.  Assessment and plan of treatment:	You came to the hospital with chest tightness and shortness of breath. You were found to have non-ST elevation myocardial infarction. You were treated with PCI and stenting, and you were also started on medical therapy. You must follow up with a cardiologist as soon as possible, and you must obtain an echocardiogram for further evaluation of your heart function, specifically the mitral valve. You must wear your life vest at all times, and if it detects an abnormal rhythm, you will feel a shock. Please weigh yourself daily, and eat a low-salt diet. Please restrict heavy fluid intake. Please monitor your blood pressure closely. If it is low, please hold your lisinopril and metoprolol, and see your cardiologist immediately.  Secondary Diagnosis:	Diabetes mellitus  Goal:	Control blood sugars, maintain low HgbA1c.  Assessment and plan of treatment:	Please continue taking your metformin as prescribed, and follow up with your primary doctor as soon as possible. Please eat a low carb, low salt diet.

## 2018-11-23 NOTE — DISCHARGE NOTE ADULT - MEDICATION SUMMARY - MEDICATIONS TO TAKE
I will START or STAY ON the medications listed below when I get home from the hospital:    aspirin 81 mg oral delayed release tablet  -- 1 tab(s) by mouth once a day  -- Indication: For NSTEMI (non-ST elevated myocardial infarction)    lisinopril 2.5 mg oral tablet  -- 1 tab(s) by mouth once a day  -- Indication: For NSTEMI (non-ST elevated myocardial infarction)    metFORMIN 1000 mg oral tablet  -- 1 tab(s) by mouth 2 times a day  -- Indication: For Diabetes mellitus    atorvastatin 80 mg oral tablet  -- 1 tab(s) by mouth once a day (at bedtime)  -- Indication: For NSTEMI (non-ST elevated myocardial infarction)    clopidogrel 75 mg oral tablet  -- 1 tab(s) by mouth once a day  -- Indication: For NSTEMI (non-ST elevated myocardial infarction)    metoprolol succinate 25 mg oral tablet, extended release  -- 1 tab(s) by mouth once a day  -- Indication: For NSTEMI (non-ST elevated myocardial infarction)

## 2018-11-29 ENCOUNTER — APPOINTMENT (OUTPATIENT)
Dept: CARDIOLOGY | Facility: CLINIC | Age: 58
End: 2018-11-29
Payer: COMMERCIAL

## 2018-11-29 ENCOUNTER — NON-APPOINTMENT (OUTPATIENT)
Age: 58
End: 2018-11-29

## 2018-11-29 VITALS
DIASTOLIC BLOOD PRESSURE: 79 MMHG | BODY MASS INDEX: 26.6 KG/M2 | OXYGEN SATURATION: 99 % | RESPIRATION RATE: 12 BRPM | HEART RATE: 80 BPM | HEIGHT: 71 IN | WEIGHT: 190 LBS | SYSTOLIC BLOOD PRESSURE: 119 MMHG

## 2018-11-29 DIAGNOSIS — Z82.49 FAMILY HISTORY OF ISCHEMIC HEART DISEASE AND OTHER DISEASES OF THE CIRCULATORY SYSTEM: ICD-10-CM

## 2018-11-29 PROCEDURE — 93000 ELECTROCARDIOGRAM COMPLETE: CPT

## 2018-11-29 PROCEDURE — 99214 OFFICE O/P EST MOD 30 MIN: CPT | Mod: 25

## 2018-12-05 ENCOUNTER — APPOINTMENT (OUTPATIENT)
Dept: CARDIOLOGY | Facility: CLINIC | Age: 58
End: 2018-12-05
Payer: COMMERCIAL

## 2018-12-05 VITALS
HEIGHT: 71 IN | SYSTOLIC BLOOD PRESSURE: 120 MMHG | BODY MASS INDEX: 26.74 KG/M2 | DIASTOLIC BLOOD PRESSURE: 77 MMHG | WEIGHT: 191 LBS | HEART RATE: 72 BPM | OXYGEN SATURATION: 98 %

## 2018-12-05 PROCEDURE — 99214 OFFICE O/P EST MOD 30 MIN: CPT

## 2018-12-05 RX ORDER — LISINOPRIL 5 MG/1
5 TABLET ORAL DAILY
Qty: 90 | Refills: 6 | Status: DISCONTINUED | COMMUNITY
Start: 2018-12-05 | End: 2018-12-05

## 2018-12-05 NOTE — PHYSICAL EXAM
[General Appearance - Well Developed] : well developed [Normal Appearance] : normal appearance [Well Groomed] : well groomed [General Appearance - In No Acute Distress] : no acute distress [Normal Conjunctiva] : the conjunctiva exhibited no abnormalities [Eyelids - No Xanthelasma] : the eyelids demonstrated no xanthelasmas [Normal Oral Mucosa] : normal oral mucosa [No Oral Pallor] : no oral pallor [Normal Oropharynx] : normal oropharynx [Normal Jugular Venous A Waves Present] : normal jugular venous A waves present [Normal Jugular Venous V Waves Present] : normal jugular venous V waves present [No Jugular Venous Pantoja A Waves] : no jugular venous pantoja A waves [] : no respiratory distress [Respiration, Rhythm And Depth] : normal respiratory rhythm and effort [Auscultation Breath Sounds / Voice Sounds] : lungs were clear to auscultation bilaterally [Heart Rate And Rhythm] : heart rate and rhythm were normal [Heart Sounds] : normal S1 and S2 [Arterial Pulses Normal] : the arterial pulses were normal [Bowel Sounds] : normal bowel sounds [Abdomen Soft] : soft [Abdomen Tenderness] : non-tender [Abnormal Walk] : normal gait [Nail Clubbing] : no clubbing of the fingernails [Cyanosis, Localized] : no localized cyanosis [Petechial Hemorrhages (___cm)] : no petechial hemorrhages [FreeTextEntry1] : no edema [Skin Color & Pigmentation] : normal skin color and pigmentation [Skin Turgor] : normal skin turgor [Oriented To Time, Place, And Person] : oriented to person, place, and time [Affect] : the affect was normal

## 2018-12-05 NOTE — REASON FOR VISIT
[Follow-Up - From Hospitalization] : follow-up of a recent hospitalization for [Coronary Artery Disease] : coronary artery disease [Heart Failure] : congestive heart failure [Spouse] : spouse [FreeTextEntry2] : admitted 11/14-11/23/18 NSTEMI S/P PCI with HFrEF

## 2018-12-05 NOTE — HISTORY OF PRESENT ILLNESS
[FreeTextEntry1] : Mr. Justin Jefferson is a 57 yo M w/ T2DM (on oral meds) who p/w NSTEMI found to have triple vessel CAD and new HFrEF (EF 15-20%, LVEDD 6 cm) with possible moderate-severe MR, s/p Impella-Assisted PCI on 11/20/18 w/ stent x 1 to the pLAD, course complicated by orthostatic hypotension. Pt is here for an initial post hospital follow up to establish care in the HF clinic. Patient's primary is Dr. Abebe Dougherty and cardiologist is Dr. Grayson Manuel. Of note, pt has no history of smoking or ETOH. His father had CAD CABG at age 75 and mother had Parkinsons.\par \par Pt initially presented to [facility] with SOB and midsternal chest tightness. Echo there showed severe global dysfunction with elevated Trop 0.6, pt was loaded with  mg and Plavix 600 mg and\par transferred to [facility] for NSTEMI. In ED, patient continued with BiPAP started on nitroglycerin gtt for elevated BP. Transferred to CCU for further management. TTE on 11/14 showed severe global LVSF with EF 15%. A cardiac cath on 11/15 showed stenosis of the pLAD 90%, mLCX 100%, pRCA 100%. A VERN on 11/16 showed EF 16%, severe MR, severe LA & LV enlargement, global LVSD, severe stage III diastolic dysfunction. A viability study was performed which showed that the anterior, anteroseptal, anterolateral, apical, and mid to distal inferoseptal walls demonstrate mostly preserved perfusion on rest imaging suggestive of viable myocardium. CT surgery was consulted and felt patient was not candidate for CABG, and decision was made for patient to undergo Impella-assisted PCI on 11/20/18. Stent x 1 placed to the pLAD (95%). Patient's course was complicated by orthostatic hypotension.His blood pressure improved, and he was discharged on ACE-inhibitor, beta blocker, statin, and dual anti-platelet therapy.\par \par Pt was d/c on 11/23/18 with D/C weight of 192.4 lbs and he is currently 191 lb, not on diuretics. \par He is able to walk on the treadmill for 5 minutes and is able to walk several blocks without dyspnea. He climbs a flight of steps up to 10 times per day and he sleeps with one pillow with no orthopnea. He denies chest pain, palpitations, dizziness/LH, syncope and pt does not have an ICD. He is wearing a Life Vest.

## 2018-12-05 NOTE — ASSESSMENT
[FreeTextEntry1] : 57 yo M w/ T2DM (on oral meds) who p/w NSTEMI found to have triple vessel CAD and new HFrEF (EF 15-20%, LVEDD 6 cm) with possible moderate-severe MR, s/p Impella-Assisted PCI on 11/20/18 w/ stent x 1 to the pLAD, course complicated by orthostatic hypotension.\par Overall stage C HF, NYHA class II\par Currently appears compensated, euvolemic and normotensive \par \par 1. Ischemic cardiomyopathy HFrEF-\par - Increase metoprolol to 50 mg daily from 25 mg daily with goal of 100 mg daily\par - Increase lisinopril to 5 mg daily from 2.5 mg daily\par - Pt is not on diuretics and does not require at this time\par - Keep a log of daily weight and B/P and bring to next appt\par - Limit salt to less than 2000 mg per day\par - Limit fluid to less than 2 liters per day\par - For lab work in 10 days on increased dose of lisinopril\par - HF teaching done and pt was given a HF booklet to reinforce\par - Continue Life Vest and will continue up titration of medications to GDMT and will repeat TTE one month after. If LVEF < 35%, will send to EP for evaluation for ICD implantation.\par \par 2. CAD S/P PCI of LAD- no active chest pain or EKG changes\par - continue ASA and plavix\par - Continue metoprolol\par - Pt has follow up with cardiologist in 2 weeks Dr. Manuel \par -Continue medication up titration to GDMT\par \par Follow up with Dr. Triana in 3 weeks

## 2018-12-19 ENCOUNTER — APPOINTMENT (OUTPATIENT)
Dept: CARDIOLOGY | Facility: CLINIC | Age: 58
End: 2018-12-19
Payer: COMMERCIAL

## 2018-12-19 ENCOUNTER — NON-APPOINTMENT (OUTPATIENT)
Age: 58
End: 2018-12-19

## 2018-12-19 VITALS
OXYGEN SATURATION: 97 % | TEMPERATURE: 97.89 F | SYSTOLIC BLOOD PRESSURE: 114 MMHG | DIASTOLIC BLOOD PRESSURE: 77 MMHG | HEART RATE: 63 BPM | RESPIRATION RATE: 14 BRPM | BODY MASS INDEX: 26.88 KG/M2 | WEIGHT: 192 LBS | HEIGHT: 71 IN

## 2018-12-19 DIAGNOSIS — Z86.39 PERSONAL HISTORY OF OTHER ENDOCRINE, NUTRITIONAL AND METABOLIC DISEASE: ICD-10-CM

## 2018-12-19 PROCEDURE — 93000 ELECTROCARDIOGRAM COMPLETE: CPT

## 2018-12-19 PROCEDURE — 99214 OFFICE O/P EST MOD 30 MIN: CPT | Mod: 25

## 2019-01-02 ENCOUNTER — APPOINTMENT (OUTPATIENT)
Dept: CARDIOLOGY | Facility: CLINIC | Age: 59
End: 2019-01-02
Payer: COMMERCIAL

## 2019-01-02 ENCOUNTER — OTHER (OUTPATIENT)
Age: 59
End: 2019-01-02

## 2019-01-02 VITALS
HEIGHT: 71 IN | WEIGHT: 184 LBS | DIASTOLIC BLOOD PRESSURE: 69 MMHG | BODY MASS INDEX: 25.76 KG/M2 | HEART RATE: 59 BPM | OXYGEN SATURATION: 99 % | SYSTOLIC BLOOD PRESSURE: 107 MMHG

## 2019-01-02 PROCEDURE — 99214 OFFICE O/P EST MOD 30 MIN: CPT

## 2019-01-03 ENCOUNTER — RESULT REVIEW (OUTPATIENT)
Age: 59
End: 2019-01-03

## 2019-01-03 LAB
ANION GAP SERPL CALC-SCNC: 16 MMOL/L
BUN SERPL-MCNC: 10 MG/DL
CALCIUM SERPL-MCNC: 10.1 MG/DL
CHLORIDE SERPL-SCNC: 97 MMOL/L
CO2 SERPL-SCNC: 26 MMOL/L
CREAT SERPL-MCNC: 0.88 MG/DL
GLUCOSE SERPL-MCNC: 84 MG/DL
NT-PROBNP SERPL-MCNC: 577 PG/ML
POTASSIUM SERPL-SCNC: 4.5 MMOL/L
SODIUM SERPL-SCNC: 138 MMOL/L

## 2019-01-14 ENCOUNTER — APPOINTMENT (OUTPATIENT)
Dept: CARDIOLOGY | Facility: CLINIC | Age: 59
End: 2019-01-14
Payer: COMMERCIAL

## 2019-01-14 VITALS
HEART RATE: 63 BPM | OXYGEN SATURATION: 100 % | WEIGHT: 182 LBS | SYSTOLIC BLOOD PRESSURE: 118 MMHG | BODY MASS INDEX: 25.48 KG/M2 | HEIGHT: 71 IN | DIASTOLIC BLOOD PRESSURE: 73 MMHG

## 2019-01-14 LAB
ANION GAP SERPL CALC-SCNC: 12 MMOL/L
BUN SERPL-MCNC: 12 MG/DL
CALCIUM SERPL-MCNC: 9.7 MG/DL
CHLORIDE SERPL-SCNC: 99 MMOL/L
CO2 SERPL-SCNC: 23 MMOL/L
CREAT SERPL-MCNC: 0.87 MG/DL
GLUCOSE SERPL-MCNC: 117 MG/DL
MAGNESIUM SERPL-MCNC: 1.7 MG/DL
POTASSIUM SERPL-SCNC: 4.2 MMOL/L
SODIUM SERPL-SCNC: 134 MMOL/L

## 2019-01-14 PROCEDURE — 99214 OFFICE O/P EST MOD 30 MIN: CPT

## 2019-01-14 RX ORDER — LISINOPRIL 2.5 MG/1
2.5 TABLET ORAL DAILY
Qty: 30 | Refills: 2 | Status: DISCONTINUED | COMMUNITY
Start: 2019-01-03 | End: 2019-01-14

## 2019-01-14 RX ORDER — LISINOPRIL 5 MG/1
5 TABLET ORAL DAILY
Qty: 30 | Refills: 3 | Status: DISCONTINUED | COMMUNITY
Start: 2018-12-05 | End: 2019-01-14

## 2019-01-14 NOTE — REASON FOR VISIT
[Follow-Up - Clinic] : a clinic follow-up of [Cardiomyopathy] : cardiomyopathy [Heart Failure] : congestive heart failure [Spouse] : spouse [FreeTextEntry1] : Instructions:\par \par 1. Continue current doses of medications at this time. I will send in a refill for your Metoprolol. \par \par 2. We will call you tomorrow with the results of your blood work and further instructions regarding stopping lisinopril and switching to Entresto.\par \par 3. Follow up with Dr. Triana on 1/28 at 2pm\par \par 4. Please call us with any questions or concerns at [phone]

## 2019-01-14 NOTE — ASSESSMENT
[FreeTextEntry1] : Mr. Jefferson is a 57 yo M with an ischemic cardiomyopathy, HFrEF (EF 15-20%, LVEDD 6 cm), s/p Impella-Assisted PCI on 11/20/18 w/ 1 stent to pLAD, moderate-severe MR, and T2DM who has been doing well since his last visit, tolerating uptitration of neurohormonal antagonists. He is ACC/AHA Stage C HF with NYHA class I-II symptoms. He currently appears well compensated, euvolemic, and normotensive.\par \par 1. Ischemic cardiomyopathy HFrEF-\par - Will continue metoprolol 50 mg daily. Further uptitration limited by HR of 50-60s as he is currently without a device.\par - Will check labs today and if renal function and potassium are acceptable will plan to switch lisinopril to Entresto 24-26mg BID.\par - Plan for addition of spironolactone pending review of follow up labs on Entresto \par - No diuretic requirement at this time. Will continue to closely monitor weights.\par - He will keep a log of daily weight and B/P and bring to next appt.\par - Limit salt to less than 2000 mg per day.\par - Limit fluid to less than 2 liters per day.\par - Continue Life Vest and will continue up titration of medications to GDMT as tolerated. Will repeat TTE one month after, and if LVEF < 35%, will send to EP for evaluation for ICD implantation.\par \par 2. CAD S/P PCI of LAD- without s/s of ischemia\par - Continue ASA, plavix, and Toprol\par - Consider initiation of MRA following review of labs as noted above\par \par 3. Follow up with Dr. Triana on 1/28/19.

## 2019-01-14 NOTE — HISTORY OF PRESENT ILLNESS
[FreeTextEntry1] : Mr. Jefferson is a 59 yo M who was admitted at [facility] from 11/14/18-11/23/18 with an NSTEMI and found to have TVD, s/p Impella-Assisted PCI on 11/20/18 w/ 1 stent to pLAD, found to have HFrEF (EF 15-20%, LVEDD 6 cm), with possible moderate-severe MR. Other PMH is notable for T2DM (on oral meds). He returns today for routine follow-up.\par \par Following his last visit on 1/2/19, his lisinopril was increased from 5mg to 7.5mg daily. He has been tolerating the medication changes well. He notes his home SBP to be ranging [id number], HR high 50s to  low 60s. States his home weight today was 180lbs, 182lbs here in clinic, down 2lbs from last visit. He has lost about 12lbs since discharge, which he attributes to changes in his diet. He walks on the treadmill for 30 minutes daily, slowly working up to a speed of 1.7 and then cooling down. He is able to walk up a flight of stairs multiple times throughout the day as well, both without dyspnea. He denies orthopnea, PND, CP, palpitations, lightheadedness, dizziness, LE edema, and abdominal distention. His appetite is normal and his bowel and bladder habits are unchanged. He reports following a low sodium, low carb, low saturated fat diet.He endorses drinking approximately 64 fl oz per day. He has been taking his medications as directed. He is wearing a Life Vest and states it has not discharged.

## 2019-01-14 NOTE — PHYSICAL EXAM
[General Appearance - Well Developed] : well developed [Normal Appearance] : normal appearance [Well Groomed] : well groomed [General Appearance - Well Nourished] : well nourished [General Appearance - In No Acute Distress] : no acute distress [Normal Conjunctiva] : the conjunctiva exhibited no abnormalities [Eyelids - No Xanthelasma] : the eyelids demonstrated no xanthelasmas [No Oral Pallor] : no oral pallor [No Oral Cyanosis] : no oral cyanosis [] : no respiratory distress [Respiration, Rhythm And Depth] : normal respiratory rhythm and effort [Auscultation Breath Sounds / Voice Sounds] : lungs were clear to auscultation bilaterally [Heart Rate And Rhythm] : heart rate and rhythm were normal [Heart Sounds] : normal S1 and S2 [Murmurs] : no murmurs present [Arterial Pulses Normal] : the arterial pulses were normal [Edema] : no peripheral edema present [2+] : left 2+ [Bowel Sounds] : normal bowel sounds [Abdomen Soft] : soft [Abdomen Tenderness] : non-tender [Abnormal Walk] : normal gait [Nail Clubbing] : no clubbing of the fingernails [Cyanosis, Localized] : no localized cyanosis [Skin Color & Pigmentation] : normal skin color and pigmentation [Skin Turgor] : normal skin turgor [Oriented To Time, Place, And Person] : oriented to person, place, and time [Impaired Insight] : insight and judgment were intact [Affect] : the affect was normal [Mood] : the mood was normal [FreeTextEntry1] : JVP < 6 cm H2O, no HJR

## 2019-01-15 LAB — NT-PROBNP SERPL-MCNC: 434 PG/ML

## 2019-01-28 ENCOUNTER — APPOINTMENT (OUTPATIENT)
Dept: CARDIOLOGY | Facility: CLINIC | Age: 59
End: 2019-01-28
Payer: COMMERCIAL

## 2019-01-28 VITALS
BODY MASS INDEX: 25.2 KG/M2 | WEIGHT: 180 LBS | HEIGHT: 71 IN | OXYGEN SATURATION: 98 % | SYSTOLIC BLOOD PRESSURE: 109 MMHG | DIASTOLIC BLOOD PRESSURE: 67 MMHG | HEART RATE: 63 BPM

## 2019-01-28 PROCEDURE — 99214 OFFICE O/P EST MOD 30 MIN: CPT

## 2019-01-28 NOTE — REASON FOR VISIT
[Follow-Up - Clinic] : a clinic follow-up of [Cardiomyopathy] : cardiomyopathy [Heart Failure] : congestive heart failure [Spouse] : spouse

## 2019-01-29 LAB
ALBUMIN SERPL ELPH-MCNC: 5 G/DL
ALP BLD-CCNC: 66 U/L
ALT SERPL-CCNC: 29 U/L
ANION GAP SERPL CALC-SCNC: 13 MMOL/L
AST SERPL-CCNC: 29 U/L
BILIRUB SERPL-MCNC: 0.7 MG/DL
BUN SERPL-MCNC: 12 MG/DL
CALCIUM SERPL-MCNC: 10.1 MG/DL
CHLORIDE SERPL-SCNC: 101 MMOL/L
CHOLEST SERPL-MCNC: 97 MG/DL
CHOLEST/HDLC SERPL: 2.4 RATIO
CO2 SERPL-SCNC: 25 MMOL/L
CREAT SERPL-MCNC: 0.78 MG/DL
GLUCOSE SERPL-MCNC: 88 MG/DL
HBA1C MFR BLD HPLC: 6 %
HDLC SERPL-MCNC: 40 MG/DL
LDLC SERPL CALC-MCNC: 48 MG/DL
NT-PROBNP SERPL-MCNC: 443 PG/ML
POTASSIUM SERPL-SCNC: 4.5 MMOL/L
PROT SERPL-MCNC: 7.1 G/DL
SODIUM SERPL-SCNC: 139 MMOL/L
TRIGL SERPL-MCNC: 47 MG/DL
TSH SERPL-ACNC: 1.19 UIU/ML

## 2019-01-29 NOTE — HISTORY OF PRESENT ILLNESS
[FreeTextEntry1] : Mr. Jefferson is a 59 y/o M with h/o CAD s/p Impella-assisted PCI to pLAD 11/20/18 with residual  of LCx and RCA, HFrEF/ICM (EF 15-20%, LVEDD 6 cm), moderate-severe MR, T2DM (on oral meds) who presents for f/u. \par \anna Was last seen 1/14 where he was started on Entresto 24-26 which he has tolerated well. Has been doing well and adhering to healthier diet with some weight loss as a result. Takes meticulous records of his sugars, BP and weight. \par \par He walks on the treadmill for 30 minutes daily, slowly working up to a speed of 1.7 and then cooling down. He is able to walk up a flight of stairs multiple times throughout the day as well, both without dyspnea. He denies orthopnea, PND, CP, palpitations, lightheadedness, dizziness, LE edema, and abdominal distention. He reports following a low sodium, low carb, low saturated fat diet.He endorses drinking approximately 64 fl oz per day. He has been taking his medications as directed. He is wearing a Life Vest and states it has not discharged.

## 2019-01-29 NOTE — ASSESSMENT
[FreeTextEntry1] : Mr. Jefferson is a 57 yo M with an ischemic cardiomyopathy, HFrEF (EF 15-20%, LVEDD 6 cm), s/p Impella-Assisted PCI on 11/20/18 w/ 1 stent to pLAD, moderate-severe MR, and T2DM who has been doing well since his last visit, tolerating uptitration of neurohormonal antagonists. He is ACC/AHA Stage C HF with NYHA class I-II symptoms. He currently appears well compensated, euvolemic, and normotensive.\par \par 1. Ischemic cardiomyopathy HFrEF-\par - Will continue metoprolol 50 mg daily. Further uptitration limited by HR of 50-60s as he is currently without a device.\par - labs checked which were stable; will add spironolactone 12.5 mg daily; continue current dose of Entresto 24-26 until next visit\par - No diuretic requirement at this time. Will continue to closely monitor weights.\par - He will keep a log of daily weight and B/P and bring to next appt.\par - Limit salt to less than 2000 mg per day.\par - Limit fluid to less than 2 liters per day.\par - Continue Life Vest and will continue up titration of medications to GDMT as tolerated. Will repeat TTE one month after, and if LVEF < 35%, will send to EP for evaluation for ICD implantation.\par \par 2. CAD S/P PCI of LAD- without s/s of ischemia\par - Continue ASA, plavix, and Toprol\par - continue lipitor 80 mg daily; lipid panel within goal \par \par RTC NP 1 month, 2 months with me

## 2019-02-04 ENCOUNTER — APPOINTMENT (OUTPATIENT)
Dept: CARDIOLOGY | Facility: CLINIC | Age: 59
End: 2019-02-04
Payer: COMMERCIAL

## 2019-02-04 ENCOUNTER — NON-APPOINTMENT (OUTPATIENT)
Age: 59
End: 2019-02-04

## 2019-02-04 VITALS
OXYGEN SATURATION: 100 % | HEART RATE: 69 BPM | BODY MASS INDEX: 25.48 KG/M2 | DIASTOLIC BLOOD PRESSURE: 73 MMHG | HEIGHT: 71 IN | SYSTOLIC BLOOD PRESSURE: 110 MMHG | WEIGHT: 182 LBS | RESPIRATION RATE: 12 BRPM

## 2019-02-04 PROCEDURE — 99214 OFFICE O/P EST MOD 30 MIN: CPT | Mod: 25

## 2019-02-04 PROCEDURE — 93000 ELECTROCARDIOGRAM COMPLETE: CPT

## 2019-02-10 ENCOUNTER — NON-APPOINTMENT (OUTPATIENT)
Age: 59
End: 2019-02-10

## 2019-02-10 PROBLEM — Z86.39 HISTORY OF TYPE 2 DIABETES MELLITUS: Status: RESOLVED | Noted: 2019-02-10 | Resolved: 2019-02-10

## 2019-02-10 PROBLEM — Z86.39 HISTORY OF HYPERLIPIDEMIA: Status: RESOLVED | Noted: 2019-02-10 | Resolved: 2019-02-10

## 2019-02-10 NOTE — DISCUSSION/SUMMARY
[FreeTextEntry1] : IMPRESSION: Mr. RAMIREZ is a 58 year-old man with a history of type 2 Diabetes and newly diagnosed coronary artery disease following a NSTEMI with drug-eluting stent placed to the proximal LAD earlier this month, severe ischemic cardiomyopathy, HTN, and hyperlipidemia who presents for further management of coronary artery disease.\par \par PLAN:\par 1. He is status post dee-eluting stent placed  to the proximal LAD following a NSTEMI. He will continue on ASA 81 mg daily as well as Plavix 75 mg daily. He is currently wearing a Life Vest which he states that he will be wearing for 3 months, prior to which he will have an echocardiogram to determine placement of a subsequent ICD.\par 2. His blood pressure is adequately controlled, thus he will continue on Lisinopril 2.5 mg daily and Toprol XL 25 mg daily which will also be beneficial given his cardiomyopathy. I have not made any changes to his medical regimen as he is scheduled to see the Heart Failure team next week and his regimen may be adjusted then (initiate diuretics or even possibly Entresto).\par 3. He will continue on Lipitor 80 mg daily and we should aim for a goal LDL of less than 70.\par 4. He will follow up with me in 3 weeks or sooner should he experience any symptoms in the interim.

## 2019-02-10 NOTE — PHYSICAL EXAM
[General Appearance - Well Developed] : well developed [Normal Appearance] : normal appearance [Well Groomed] : well groomed [General Appearance - Well Nourished] : well nourished [No Deformities] : no deformities [General Appearance - In No Acute Distress] : no acute distress [Normal Conjunctiva] : the conjunctiva exhibited no abnormalities [Normal Oral Mucosa] : normal oral mucosa [No Oral Pallor] : no oral pallor [No Oral Cyanosis] : no oral cyanosis [Normal Jugular Venous A Waves Present] : normal jugular venous A waves present [Normal Jugular Venous V Waves Present] : normal jugular venous V waves present [No Jugular Venous Pantoja A Waves] : no jugular venous pantoja A waves [Respiration, Rhythm And Depth] : normal respiratory rhythm and effort [Exaggerated Use Of Accessory Muscles For Inspiration] : no accessory muscle use [Auscultation Breath Sounds / Voice Sounds] : lungs were clear to auscultation bilaterally [Normal Rate] : normal [Rhythm Regular] : regular [Normal S1] : normal S1 [Normal S2] : normal S2 [No Murmur] : no murmurs heard [No Pitting Edema] : no pitting edema present [Bowel Sounds] : normal bowel sounds [Abdomen Soft] : soft [Abdomen Tenderness] : non-tender [Abnormal Walk] : normal gait [Gait - Sufficient For Exercise Testing] : the gait was sufficient for exercise testing [Nail Clubbing] : no clubbing of the fingernails [Cyanosis, Localized] : no localized cyanosis [Petechial Hemorrhages (___cm)] : no petechial hemorrhages [Skin Color & Pigmentation] : normal skin color and pigmentation [] : no rash [Skin Lesions] : no skin lesions [No Skin Ulcers] : no skin ulcer [Oriented To Time, Place, And Person] : oriented to person, place, and time [Affect] : the affect was normal [Mood] : the mood was normal [No Anxiety] : not feeling anxious [FreeTextEntry1] : Extraocular muscles intact. Anicteric sclerae. [Right Carotid Bruit] : no bruit heard over the right carotid [Left Carotid Bruit] : no bruit heard over the left carotid [Bruit] : no bruit heard

## 2019-02-10 NOTE — HISTORY OF PRESENT ILLNESS
[FreeTextEntry1] : Patient is a 58 year old man with a previous history of type 2 Diabetes mellitus and recently diagnosed coronary artery disease status post NSTEMI and ROSHNI placed to the proximal LAD 11/20/2018, ischemic cardiomyopathy, HTN, severe mitral regurgitation, hyperlipidemia, and family history of CAD who presents today for follow up of coronary artery disease. He states that he has been feeling relatively well denying any chest pain, dyspnea, palpitations, headaches, dizziness, PND, orthopnea, or syncope. He is currently wearing a Life vest.

## 2019-02-10 NOTE — PHYSICAL EXAM
[General Appearance - Well Developed] : well developed [Normal Appearance] : normal appearance [Well Groomed] : well groomed [General Appearance - Well Nourished] : well nourished [No Deformities] : no deformities [General Appearance - In No Acute Distress] : no acute distress [Normal Conjunctiva] : the conjunctiva exhibited no abnormalities [Normal Oral Mucosa] : normal oral mucosa [No Oral Pallor] : no oral pallor [No Oral Cyanosis] : no oral cyanosis [FreeTextEntry1] : Extraocular muscles intact. Anicteric sclerae. [Normal Jugular Venous A Waves Present] : normal jugular venous A waves present [Normal Jugular Venous V Waves Present] : normal jugular venous V waves present [No Jugular Venous Pantoja A Waves] : no jugular venous pantoja A waves [Respiration, Rhythm And Depth] : normal respiratory rhythm and effort [Exaggerated Use Of Accessory Muscles For Inspiration] : no accessory muscle use [Auscultation Breath Sounds / Voice Sounds] : lungs were clear to auscultation bilaterally [Normal Rate] : normal [Rhythm Regular] : regular [Normal S1] : normal S1 [Normal S2] : normal S2 [No Murmur] : no murmurs heard [Right Carotid Bruit] : no bruit heard over the right carotid [Left Carotid Bruit] : no bruit heard over the left carotid [Bruit] : no bruit heard [No Pitting Edema] : no pitting edema present [Bowel Sounds] : normal bowel sounds [Abdomen Soft] : soft [Abdomen Tenderness] : non-tender [Abnormal Walk] : normal gait [Gait - Sufficient For Exercise Testing] : the gait was sufficient for exercise testing [Nail Clubbing] : no clubbing of the fingernails [Cyanosis, Localized] : no localized cyanosis [Petechial Hemorrhages (___cm)] : no petechial hemorrhages [Skin Color & Pigmentation] : normal skin color and pigmentation [] : no rash [Skin Lesions] : no skin lesions [No Skin Ulcers] : no skin ulcer [Oriented To Time, Place, And Person] : oriented to person, place, and time [Affect] : the affect was normal [Mood] : the mood was normal [No Anxiety] : not feeling anxious

## 2019-02-10 NOTE — HISTORY OF PRESENT ILLNESS
[FreeTextEntry1] : Patient is a 58 year old man with a history of coronary artery disease status post NSTEMI and ROSHNI placed to the proximal LAD 11/20/2018, ischemic cardiomyopathy, HTN, severe mitral regurgitation, type 2 Diabetes mellitus, hyperlipidemia, and family history of CAD who presents today for follow up of coronary artery disease. He states that he has experienced dizziness when getting up quickly from a seated position. He otherwise denies any chest pain, dyspnea, palpitations, headaches, PND, orthopnea, or syncope. He continues to wear his Life vest and has not felt it discharge. He states that he uses the treadmill on a daily basis for about 30 minutes and peaks at 1.7 mph.

## 2019-02-10 NOTE — HISTORY OF PRESENT ILLNESS
[FreeTextEntry1] : Patient is a 58 year old man with a previous history of type 2 Diabetes mellitus who initially presented to an outside hospital with chest pain and shortness of breath, found to have elevated troponins and severe LV dysfunction on an echocardiogram and was subsequently transferred to [facility] for further management. He had a cardiac catheterization that revealed severe LV dysfunction and also subsequently found to have severe mitral regurgitation. After a viability study was performed he had a drug-eluting stent placed to the proximal LAD and also had a Life Vest placed given his severe LV dysfunction.  Patient now states that he feels fatigued, but otherwise denies any chest pain, dyspnea at rest (as he is not very active), palpitations, headaches, dizziness, PND, orthopnea, or syncope.

## 2019-02-10 NOTE — DISCUSSION/SUMMARY
[FreeTextEntry1] : IMPRESSION: Mr. RAMIREZ is a 58 year old man with a previous history of type 2 Diabetes mellitus and recently diagnosed coronary artery disease status post NSTEMI and ROSHNI placed to the proximal LAD 11/20/2018, ischemic cardiomyopathy, HTN, severe mitral regurgitation, hyperlipidemia, and family history of CAD who presents today for follow up of coronary artery disease. \par \par PLAN:\par 1. He is status post drug-eluting stent placed  to the proximal LAD last month following a NSTEMI. He will continue on ASA 81 mg daily as well as Plavix 75 mg daily. He is currently wearing a Life Vest. he will have an echocardiogram in about 2 months to follow up his ejection fraction to determine placement of a subsequent ICD. He states that he has not felt his Life Vest shock him.\par 2. His blood pressure is adequately controlled, thus he will continue on Lisinopril 5 mg daily and Toprol XL 50 mg daily which will also be beneficial given his cardiomyopathy.\par 3. He will continue on Lipitor 80 mg daily and we should aim for a goal LDL of less than 70.\par 4. He will follow up with me in 6 weeks or sooner should he experience any symptoms in the interim.\par 5. He will also continue to follow up with his heart failure doctors.

## 2019-02-10 NOTE — DISCUSSION/SUMMARY
[FreeTextEntry1] : IMPRESSION: Mr. RAMIREZ is a 58 year old man with a history of coronary artery disease status post NSTEMI and ROSHNI placed to the proximal LAD 11/20/2018, ischemic cardiomyopathy, HTN, severe mitral regurgitation, type 2 Diabetes mellitus, hyperlipidemia, and family history of CAD who presents today for follow up of coronary artery disease. \par \par PLAN:\par 1. He is status post drug-eluting stent placed  to the proximal LAD 11/2018 following a NSTEMI. He will continue on ASA 81 mg daily as well as Plavix 75 mg daily. He is currently wearing a Life Vest. He is scheduled to have an echocardiogram next month to follow up his ejection fraction to determine the need for placement of an ICD. He states that he has not felt his Life Vest shock him.\par 2. His blood pressure is adequately controlled, thus he will continue on Entresto 24-26 mg twice daily, Aldactone 25 mg every other day, and Toprol XL 50 mg daily which will also be beneficial given his cardiomyopathy. His Aldactone was just started, thus I have not made any changes at this time. He is scheduled to follow up with Heart Failure in about 3 weeks at which time he will have a BMP to follow up his potassium and creatinine. \par 3. He will continue on Lipitor 80 mg daily and we should aim for a goal LDL of less than 70. His most recent LDL was excellent.\par 4. He will follow up with me in 2 months or sooner should he experience any symptoms in the interim.

## 2019-02-10 NOTE — PHYSICAL EXAM
[General Appearance - Well Developed] : well developed [Normal Appearance] : normal appearance [Well Groomed] : well groomed [General Appearance - Well Nourished] : well nourished [No Deformities] : no deformities [General Appearance - In No Acute Distress] : no acute distress [Normal Conjunctiva] : the conjunctiva exhibited no abnormalities [Normal Oral Mucosa] : normal oral mucosa [No Oral Pallor] : no oral pallor [No Oral Cyanosis] : no oral cyanosis [Normal Jugular Venous A Waves Present] : normal jugular venous A waves present [Normal Jugular Venous V Waves Present] : normal jugular venous V waves present [No Jugular Venous Pantoja A Waves] : no jugular venous pantoja A waves [Respiration, Rhythm And Depth] : normal respiratory rhythm and effort [Exaggerated Use Of Accessory Muscles For Inspiration] : no accessory muscle use [Auscultation Breath Sounds / Voice Sounds] : lungs were clear to auscultation bilaterally [Bowel Sounds] : normal bowel sounds [Abdomen Soft] : soft [Abdomen Tenderness] : non-tender [Abnormal Walk] : normal gait [Gait - Sufficient For Exercise Testing] : the gait was sufficient for exercise testing [Nail Clubbing] : no clubbing of the fingernails [Cyanosis, Localized] : no localized cyanosis [Petechial Hemorrhages (___cm)] : no petechial hemorrhages [Skin Color & Pigmentation] : normal skin color and pigmentation [] : no rash [Skin Lesions] : no skin lesions [No Skin Ulcers] : no skin ulcer [Oriented To Time, Place, And Person] : oriented to person, place, and time [Affect] : the affect was normal [Mood] : the mood was normal [No Anxiety] : not feeling anxious [Normal Rate] : normal [Rhythm Regular] : regular [Normal S1] : normal S1 [Normal S2] : normal S2 [No Murmur] : no murmurs heard [No Pitting Edema] : no pitting edema present [FreeTextEntry1] : Extraocular muscles intact. Anicteric sclerae. [Right Carotid Bruit] : no bruit heard over the right carotid [Left Carotid Bruit] : no bruit heard over the left carotid [Bruit] : no bruit heard

## 2019-02-27 ENCOUNTER — OUTPATIENT (OUTPATIENT)
Dept: OUTPATIENT SERVICES | Facility: HOSPITAL | Age: 59
LOS: 1 days | End: 2019-02-27
Payer: COMMERCIAL

## 2019-02-27 ENCOUNTER — APPOINTMENT (OUTPATIENT)
Dept: CV DIAGNOSITCS | Facility: HOSPITAL | Age: 59
End: 2019-02-27

## 2019-02-27 ENCOUNTER — APPOINTMENT (OUTPATIENT)
Dept: CARDIOLOGY | Facility: CLINIC | Age: 59
End: 2019-02-27
Payer: COMMERCIAL

## 2019-02-27 VITALS
HEIGHT: 71 IN | OXYGEN SATURATION: 100 % | SYSTOLIC BLOOD PRESSURE: 104 MMHG | HEART RATE: 64 BPM | DIASTOLIC BLOOD PRESSURE: 66 MMHG | WEIGHT: 177 LBS | BODY MASS INDEX: 24.78 KG/M2

## 2019-02-27 DIAGNOSIS — I50.22 CHRONIC SYSTOLIC (CONGESTIVE) HEART FAILURE: ICD-10-CM

## 2019-02-27 DIAGNOSIS — R23.0 CYANOSIS: ICD-10-CM

## 2019-02-27 PROCEDURE — 93306 TTE W/DOPPLER COMPLETE: CPT | Mod: 26

## 2019-02-27 PROCEDURE — 93306 TTE W/DOPPLER COMPLETE: CPT

## 2019-02-27 PROCEDURE — 99214 OFFICE O/P EST MOD 30 MIN: CPT

## 2019-02-27 NOTE — ASSESSMENT
[FreeTextEntry1] : Mr. Jefferson is a 58 yo M with an ischemic cardiomyopathy, HFrEF (EF 15-20%, LVEDD 6 cm), s/p Impella-Assisted PCI on 11/20/18 w/ 1 stent to pLAD, moderate-severe MR, and T2DM who has been doing well since his last visit. He is ACC/AHA Stage C HF with NYHA class I-II symptoms. He currently appears well compensated, euvolemic, and normotensive.\par \par 1. Ischemic cardiomyopathy HFrEF-\par - Will continue metoprolol 50 mg daily. Further uptitration limited by HR of 50-60s as he is currently without a device.\par - He will continue current dose of Entresto 24/26 mg BID for now given at home his SBP mostly ranges from 90-100s.\par - He will continue spironolactone 25 mg QOD. Labs today to reassess K and renal function.\par - No diuretic requirement at this time. Will continue to closely monitor weights.\par - He will keep a log of daily weight and B/P and bring to next appt.\par - Limit salt to less than 2000 mg per day.\par - Limit fluid to less than 2 liters per day.\par - TTE scheduled for today. He will continue to wear LifeVest and if LVEF remains < 35%, will send to EP for evaluation for ICD implantation.\par \par 2. CAD S/P PCI of LAD- without s/s of ischemia\par - Continue ASA, plavix, and Toprol\par - continue lipitor 80 mg daily; lipid panel within goal \par \par 3. Possible peripheral vascular disease\par -  Appears to be microvascular changes in his toes\par - I will speak with Dr. Triana about referring him to vascular to further evaluate his toes.\par \par RTC in 1 month with Dr. Triana

## 2019-02-27 NOTE — PHYSICAL EXAM
[General Appearance - Well Developed] : well developed [Normal Appearance] : normal appearance [Well Groomed] : well groomed [General Appearance - Well Nourished] : well nourished [General Appearance - In No Acute Distress] : no acute distress [Normal Conjunctiva] : the conjunctiva exhibited no abnormalities [Eyelids - No Xanthelasma] : the eyelids demonstrated no xanthelasmas [No Oral Pallor] : no oral pallor [No Oral Cyanosis] : no oral cyanosis [] : no respiratory distress [Respiration, Rhythm And Depth] : normal respiratory rhythm and effort [Auscultation Breath Sounds / Voice Sounds] : lungs were clear to auscultation bilaterally [Heart Rate And Rhythm] : heart rate and rhythm were normal [Heart Sounds] : normal S1 and S2 [Murmurs] : no murmurs present [Arterial Pulses Normal] : the arterial pulses were normal [Edema] : no peripheral edema present [2+] : left 2+ [Bowel Sounds] : normal bowel sounds [Abdomen Soft] : soft [Abdomen Tenderness] : non-tender [Abnormal Walk] : normal gait [Nail Clubbing] : no clubbing of the fingernails [Skin Turgor] : normal skin turgor [Oriented To Time, Place, And Person] : oriented to person, place, and time [Impaired Insight] : insight and judgment were intact [Affect] : the affect was normal [Mood] : the mood was normal [FreeTextEntry1] : Tips of toes on right foot slightly cyanotic, cool to touch, with small petechiae. Left toes cool to touch also slightly cyanotic but not as much as right foot. 2+ right DP pulse, 1+ left DP pulse. 2+ femoral pulses b/l

## 2019-02-27 NOTE — HISTORY OF PRESENT ILLNESS
[FreeTextEntry1] : Mr. Jefferson is a 60 y/o M with h/o CAD s/p Impella-assisted PCI to pLAD 11/20/18 with residual  of LCx and RCA, HFrEF/ICM (EF 15-20%, LVEDD 6 cm), moderate-severe MR, T2DM (on oral meds) who presents for f/u. \par gino Was last seen 1/28 where he was started on spironolactone 25 mg QOD. He has continued to take Entresto 24-26 mg BID which he is tolerating well. At home SBP has been ranging  and his weight has been 175-177 lbs. He walks on the treadmill for 30 minutes daily, slowly working up to a speed of 1.8 and then cooling down about 2-3 x per week. He is able to walk up a flight of stairs multiple times throughout the day as well, both without dyspnea. He denies any orthopnea, PND, CP, palpitations, lightheadedness, dizziness, LE edema, and abdominal distention. He reports following a low sodium, low carb, low saturated fat diet. He endorses drinking approximately 64 fl oz per day. He has been taking his medications as directed. He is wearing a Life Vest and states it has not discharged. \par \par Of note, he has noticed over the past few weeks his feet have been very cool to touch and turning a purplish color with some numbness and tingling.

## 2019-03-01 LAB
ANION GAP SERPL CALC-SCNC: 12 MMOL/L
BUN SERPL-MCNC: 11 MG/DL
CALCIUM SERPL-MCNC: 9.9 MG/DL
CHLORIDE SERPL-SCNC: 96 MMOL/L
CO2 SERPL-SCNC: 25 MMOL/L
CREAT SERPL-MCNC: 0.77 MG/DL
GLUCOSE SERPL-MCNC: 109 MG/DL
MAGNESIUM SERPL-MCNC: 1.8 MG/DL
NT-PROBNP SERPL-MCNC: 344 PG/ML
POTASSIUM SERPL-SCNC: 4.3 MMOL/L
SODIUM SERPL-SCNC: 133 MMOL/L

## 2019-03-04 PROBLEM — R23.0 TOE CYANOSIS: Status: ACTIVE | Noted: 2019-03-04

## 2019-03-06 ENCOUNTER — MEDICATION RENEWAL (OUTPATIENT)
Age: 59
End: 2019-03-06

## 2019-03-25 ENCOUNTER — APPOINTMENT (OUTPATIENT)
Dept: CARDIOLOGY | Facility: CLINIC | Age: 59
End: 2019-03-25
Payer: COMMERCIAL

## 2019-03-25 ENCOUNTER — NON-APPOINTMENT (OUTPATIENT)
Age: 59
End: 2019-03-25

## 2019-03-25 VITALS
WEIGHT: 173 LBS | HEART RATE: 62 BPM | BODY MASS INDEX: 24.22 KG/M2 | HEIGHT: 71 IN | OXYGEN SATURATION: 100 % | SYSTOLIC BLOOD PRESSURE: 108 MMHG | DIASTOLIC BLOOD PRESSURE: 66 MMHG

## 2019-03-25 PROCEDURE — 99214 OFFICE O/P EST MOD 30 MIN: CPT

## 2019-03-25 PROCEDURE — 93000 ELECTROCARDIOGRAM COMPLETE: CPT

## 2019-03-25 NOTE — HISTORY OF PRESENT ILLNESS
[FreeTextEntry1] : Mr. Jefferson is a 60 y/o M with h/o CAD s/p Impella-assisted PCI to pLAD 11/20/18 with residual  of LCx and RCA, HFrEF/ICM (EF 15-20%, LVEDD 6 cm), severe MR, T2DM (on oral meds) who presents for f/u. \par gino Was last seen 2/27/19 where meds were uptitrated. He had purplish discoloration of his toes but denies claudication symptoms. Since last visit, he had a repeat TTE which showed EF 31% (however appears closer to 25% and severe MR). Had increased his Entresto to 49-51 twice/day which he has tolerated well. At home SBP has been ranging  and his weight has been 175-177 lbs. He walks on the treadmill for 30 minutes daily, slowly working up to a speed of 1.8 and then cooling down about 2-3 x per week. He is able to walk up a flight of stairs multiple times throughout the day as well, both without dyspnea. He denies any orthopnea, PND, CP, palpitations, lightheadedness, dizziness, LE edema, and abdominal distention. He reports following a low sodium, low carb, low saturated fat diet. He endorses drinking approximately 64 fl oz per day. He has been taking his medications as directed. He is wearing a Life Vest and states it has not discharged. He is awaiting EP appt for consideration of ICD. \par \par He does report episodes of lightheadedness when standing abruptly but denies syncope.

## 2019-03-25 NOTE — ASSESSMENT
[FreeTextEntry1] : Mr. Jefferson is a 60 yo M with an ischemic cardiomyopathy, HFrEF (EF 15-20%, LVEDD 6 cm), s/p Impella-Assisted PCI on 11/20/18 w/ 1 stent to pLAD, severe MR, and T2DM who has been doing well since his last visit. He is ACC/AHA Stage C HF with NYHA class I-II symptoms. He currently appears well compensated, euvolemic, and normotensive.\par \par 1. Ischemic cardiomyopathy HFrEF-\par - Will continue metoprolol 50 mg daily. Further uptitration limited by HR of 50-60s as he is currently without a device. Will discuss with Dr. De La Fuente whether we can consider CRT-D knowing he will likely have RV pacing if I further uptitrate BB\par - will renew Lifevest for primary prevention \par - He will continue current dose of Entresto 49/51 mg BID for now given at home his SBP mostly ranges from 90-100s.\par - He will continue spironolactone 25 mg QOD. Labs today to reassess K and renal function.\par - No diuretic requirement at this time. Will continue to closely monitor weights.\par - He will keep a log of daily weight and B/P and bring to next appt.\par - Limit salt to less than 2000 mg per day.\par - Limit fluid to less than 2 liters per day.\par - repeat TTE in 3 months\par \par 2. CAD S/P PCI of LAD- without s/s of ischemia\par - Continue ASA, plavix, and Toprol\par - continue lipitor 80 mg daily; lipid panel within goal \par \par 3. Possible peripheral vascular disease\par -  Appears to be microvascular changes in his toes\par - awaiting appt with Dr. Conrad\par \par 4. Severe MR - likely functional\par - if develops symptoms, will consider mitral clip but at the moment not necessary\par \par RTC 6 weeks with NP, 12 weeks with me

## 2019-03-25 NOTE — PHYSICAL EXAM
[General Appearance - Well Developed] : well developed [Normal Appearance] : normal appearance [Well Groomed] : well groomed [General Appearance - Well Nourished] : well nourished [General Appearance - In No Acute Distress] : no acute distress [Normal Conjunctiva] : the conjunctiva exhibited no abnormalities [Eyelids - No Xanthelasma] : the eyelids demonstrated no xanthelasmas [No Oral Pallor] : no oral pallor [No Oral Cyanosis] : no oral cyanosis [] : no respiratory distress [Respiration, Rhythm And Depth] : normal respiratory rhythm and effort [Auscultation Breath Sounds / Voice Sounds] : lungs were clear to auscultation bilaterally [Heart Rate And Rhythm] : heart rate and rhythm were normal [Heart Sounds] : normal S1 and S2 [Murmurs] : no murmurs present [Arterial Pulses Normal] : the arterial pulses were normal [Edema] : no peripheral edema present [2+] : left 2+ [Bowel Sounds] : normal bowel sounds [Abdomen Soft] : soft [Abdomen Tenderness] : non-tender [Abnormal Walk] : normal gait [Nail Clubbing] : no clubbing of the fingernails [Skin Turgor] : normal skin turgor [Oriented To Time, Place, And Person] : oriented to person, place, and time [Impaired Insight] : insight and judgment were intact [Affect] : the affect was normal [Mood] : the mood was normal [FreeTextEntry1] : toes do not appear dusky today but are cool to touch; good pulses DP/PT

## 2019-03-29 LAB
ANION GAP SERPL CALC-SCNC: 12 MMOL/L
BUN SERPL-MCNC: 12 MG/DL
CALCIUM SERPL-MCNC: 9.9 MG/DL
CHLORIDE SERPL-SCNC: 101 MMOL/L
CO2 SERPL-SCNC: 23 MMOL/L
CREAT SERPL-MCNC: 0.75 MG/DL
GLUCOSE SERPL-MCNC: 101 MG/DL
NT-PROBNP SERPL-MCNC: 389 PG/ML
POTASSIUM SERPL-SCNC: 4.2 MMOL/L
SODIUM SERPL-SCNC: 136 MMOL/L

## 2019-04-10 ENCOUNTER — APPOINTMENT (OUTPATIENT)
Dept: CARDIOLOGY | Facility: CLINIC | Age: 59
End: 2019-04-10

## 2019-04-11 ENCOUNTER — NON-APPOINTMENT (OUTPATIENT)
Age: 59
End: 2019-04-11

## 2019-04-11 ENCOUNTER — APPOINTMENT (OUTPATIENT)
Dept: ELECTROPHYSIOLOGY | Facility: CLINIC | Age: 59
End: 2019-04-11
Payer: COMMERCIAL

## 2019-04-11 VITALS
BODY MASS INDEX: 24.08 KG/M2 | HEART RATE: 56 BPM | WEIGHT: 172 LBS | SYSTOLIC BLOOD PRESSURE: 93 MMHG | OXYGEN SATURATION: 100 % | HEIGHT: 71 IN | DIASTOLIC BLOOD PRESSURE: 63 MMHG

## 2019-04-11 PROCEDURE — 99204 OFFICE O/P NEW MOD 45 MIN: CPT

## 2019-04-11 PROCEDURE — 93000 ELECTROCARDIOGRAM COMPLETE: CPT

## 2019-04-11 NOTE — HISTORY OF PRESENT ILLNESS
[FreeTextEntry1] : Referring Physician: Sravan Triana MD\par \par Dear Sravan:\par \par Mr. Justin Jefferson was seen in the [facility] today. For our records, please allow me to summarize the history and my findings.\par \par This pleasant 59 year old man has a cardiovascular history significant for CAD s/p PCI (11/2018 after presentation with unstable angina), NYHA III severe ICM, DM, HL, and mod-sev mitral regurgitation. He has been in a Lifevest since PCI. Repeat TTE in February showed severe persistent LV dysfunction at 30%. He presents today to discuss defibrillator implantation for the prevention of sudden death.\par \par Mr. Jefferson denies any recent history of chest pain, shortness of breath, palpitations, dizziness, or syncope.\par \par DIAGNOSTIC TEST\par

## 2019-04-11 NOTE — PHYSICAL EXAM
[General Appearance - Well Developed] : well developed [Normal Appearance] : normal appearance [Well Groomed] : well groomed [No Deformities] : no deformities [General Appearance - Well Nourished] : well nourished [General Appearance - In No Acute Distress] : no acute distress [Normal Oral Mucosa] : normal oral mucosa [Normal Jugular Venous A Waves Present] : normal jugular venous A waves present [No Oral Cyanosis] : no oral cyanosis [No Oral Pallor] : no oral pallor [No Jugular Venous Pantoja A Waves] : no jugular venous pantoja A waves [Normal Jugular Venous V Waves Present] : normal jugular venous V waves present [Heart Sounds] : normal S1 and S2 [Heart Rate And Rhythm] : heart rate and rhythm were normal [Murmurs] : no murmurs present [Respiration, Rhythm And Depth] : normal respiratory rhythm and effort [Auscultation Breath Sounds / Voice Sounds] : lungs were clear to auscultation bilaterally [Exaggerated Use Of Accessory Muscles For Inspiration] : no accessory muscle use [Abdomen Soft] : soft [Abdomen Mass (___ Cm)] : no abdominal mass palpated [Abdomen Tenderness] : non-tender [Gait - Sufficient For Exercise Testing] : the gait was sufficient for exercise testing [Abnormal Walk] : normal gait [Petechial Hemorrhages (___cm)] : no petechial hemorrhages [Cyanosis, Localized] : no localized cyanosis [Nail Clubbing] : no clubbing of the fingernails [] : no ischemic changes [Skin Color & Pigmentation] : normal skin color and pigmentation [No Skin Ulcers] : no skin ulcer [No Venous Stasis] : no venous stasis [Skin Lesions] : no skin lesions [Affect] : the affect was normal [Oriented To Time, Place, And Person] : oriented to person, place, and time [No Xanthoma] : no  xanthoma was observed [No Anxiety] : not feeling anxious [Mood] : the mood was normal

## 2019-04-11 NOTE — DISCUSSION/SUMMARY
[FreeTextEntry1] : In summary, this is a 59 year old man with history of severe persistent NYHA III ICM (EF 25-30%) despite revasculariation in 11/2018 and administration of optimal medical therapy. We discussed the association of severe LV dysfunction and ventricular arrhythmia causing syncope or sudden death, and the role of defibrillators in decreasing mortality associated with this condition. \par \par The rationale for device implantation and well as the procedural risks--including but not limited to pocket hematoma, infection, pneumothorax, pericardial bleed/tamponade, lead dislodgement, and death--were reviewed in detail. After consideration of this information, the decision was made to proceed with device implantation.\par \par Mr. Jefferson appeared to understand the whole discussion and verbalized that all of his questions were answered to his satisfaction.\par \par Thank you for allowing me to be involved in the care of this pleasant man. Please feel free to contact me with any questions.\par \par

## 2019-04-12 ENCOUNTER — APPOINTMENT (OUTPATIENT)
Dept: CARDIOLOGY | Facility: CLINIC | Age: 59
End: 2019-04-12
Payer: COMMERCIAL

## 2019-04-12 VITALS
HEIGHT: 71 IN | DIASTOLIC BLOOD PRESSURE: 60 MMHG | OXYGEN SATURATION: 100 % | BODY MASS INDEX: 24.08 KG/M2 | RESPIRATION RATE: 12 BRPM | WEIGHT: 172 LBS | SYSTOLIC BLOOD PRESSURE: 90 MMHG

## 2019-04-12 DIAGNOSIS — I73.9 PERIPHERAL VASCULAR DISEASE, UNSPECIFIED: ICD-10-CM

## 2019-04-12 PROCEDURE — 99244 OFF/OP CNSLTJ NEW/EST MOD 40: CPT

## 2019-04-12 NOTE — REASON FOR VISIT
[Consultation] : a consultation regarding [FreeTextEntry1] : 59M CAD, PCI to LAD, ICM EF 20%, MR, DM  here for a vascular medicine consultation for purple discoloration of toes\par ICD eval ongoing, needs ICD with Dr. Serrano.  \par \par in regards to his legs his feet get very cold and purple in color. Now with the warmer weather  the symptoms have resolved. No pain in the calf or thighs with walking.  Treadmill 4-5 times per week 45 minutes at a time.  No break in the skin or tissue loss.  If he were to walk quickly, must stop due to breathing, not leg pains.  The toes are not painful, more "annoying".  The righ foot is worse than the left.  His hands are affected sometimes.  Never smoker.  Very rare ETOH. This all occurred after MI, thinks its due to medications. \par \par Father with coronary disease.\par \par Currently his toes feel fine.  \par \par Upon review of the non-con CT of the abdomen in Novmeber 2018 - he has mild calcific plaque in the descending aorta. \par  \par \par

## 2019-04-12 NOTE — PHYSICAL EXAM
[General Appearance - Well Developed] : well developed [Normal Appearance] : normal appearance [Well Groomed] : well groomed [General Appearance - Well Nourished] : well nourished [No Deformities] : no deformities [General Appearance - In No Acute Distress] : no acute distress [Normal Conjunctiva] : the conjunctiva exhibited no abnormalities [Eyelids - No Xanthelasma] : the eyelids demonstrated no xanthelasmas [Normal Oral Mucosa] : normal oral mucosa [No Oral Pallor] : no oral pallor [No Oral Cyanosis] : no oral cyanosis [Normal Jugular Venous A Waves Present] : normal jugular venous A waves present [Normal Jugular Venous V Waves Present] : normal jugular venous V waves present [No Jugular Venous Pantoja A Waves] : no jugular venous pantoja A waves [Heart Rate And Rhythm] : heart rate and rhythm were normal [Heart Sounds] : normal S1 and S2 [Murmurs] : no murmurs present [Respiration, Rhythm And Depth] : normal respiratory rhythm and effort [Exaggerated Use Of Accessory Muscles For Inspiration] : no accessory muscle use [Auscultation Breath Sounds / Voice Sounds] : lungs were clear to auscultation bilaterally [Abdomen Soft] : soft [Abdomen Tenderness] : non-tender [Abdomen Mass (___ Cm)] : no abdominal mass palpated [Abnormal Walk] : normal gait [Gait - Sufficient For Exercise Testing] : the gait was sufficient for exercise testing [Nail Clubbing] : no clubbing of the fingernails [Cyanosis, Localized] : no localized cyanosis [Petechial Hemorrhages (___cm)] : no petechial hemorrhages [Skin Color & Pigmentation] : normal skin color and pigmentation [] : no rash [No Venous Stasis] : no venous stasis [Skin Lesions] : no skin lesions [No Skin Ulcers] : no skin ulcer [No Xanthoma] : no  xanthoma was observed [Oriented To Time, Place, And Person] : oriented to person, place, and time [Affect] : the affect was normal [Mood] : the mood was normal [No Anxiety] : not feeling anxious [FreeTextEntry1] : palpable pedal arteries.  purple discoloration and cool temp of toes.  Resolves with foot elevation.  No tissue loss.  Strong pedal pulses.  Cool toes.  Purple discoloration.  no pitting or tissue losss

## 2019-04-12 NOTE — REVIEW OF SYSTEMS
[Leg Claudication] : intermittent leg claudication [Negative] : Heme/Lymph [FreeTextEntry1] : purple toes

## 2019-04-12 NOTE — ASSESSMENT
[FreeTextEntry1] : Assessmetn:\par 1. Acrocynosis.  \par 2.  PAD\par 3.  CHF\par 4.  Life vest\par 5.  MR\par \par  Plan: \par 1.  Keep core body temperature warm, wool socks, daily foot checks\par 2.  JOHNY/PVR and arterial duplex\par 3.  Avoidance of triggers such as cold, caffeine, anxiety.  \par 4.  Continue excellent care per ER and CHF teams\par 5.  No vascular contraindication to ICD placement.

## 2019-04-17 ENCOUNTER — INPATIENT (INPATIENT)
Facility: HOSPITAL | Age: 59
LOS: 0 days | Discharge: HOME | DRG: 227 | End: 2019-04-18
Attending: INTERNAL MEDICINE | Admitting: INTERNAL MEDICINE
Payer: COMMERCIAL

## 2019-04-17 VITALS
SYSTOLIC BLOOD PRESSURE: 111 MMHG | HEIGHT: 71 IN | OXYGEN SATURATION: 100 % | DIASTOLIC BLOOD PRESSURE: 69 MMHG | TEMPERATURE: 98 F | RESPIRATION RATE: 18 BRPM | WEIGHT: 171.96 LBS | HEART RATE: 57 BPM

## 2019-04-17 DIAGNOSIS — I25.10 ATHEROSCLEROTIC HEART DISEASE OF NATIVE CORONARY ARTERY WITHOUT ANGINA PECTORIS: ICD-10-CM

## 2019-04-17 LAB
ANION GAP SERPL CALC-SCNC: 11 MMOL/L — SIGNIFICANT CHANGE UP (ref 5–17)
BLD GP AB SCN SERPL QL: NEGATIVE — SIGNIFICANT CHANGE UP
BUN SERPL-MCNC: 11 MG/DL — SIGNIFICANT CHANGE UP (ref 7–23)
CALCIUM SERPL-MCNC: 9.9 MG/DL — SIGNIFICANT CHANGE UP (ref 8.4–10.5)
CHLORIDE SERPL-SCNC: 98 MMOL/L — SIGNIFICANT CHANGE UP (ref 96–108)
CO2 SERPL-SCNC: 27 MMOL/L — SIGNIFICANT CHANGE UP (ref 22–31)
CREAT SERPL-MCNC: 0.77 MG/DL — SIGNIFICANT CHANGE UP (ref 0.5–1.3)
GLUCOSE SERPL-MCNC: 97 MG/DL — SIGNIFICANT CHANGE UP (ref 70–99)
HCT VFR BLD CALC: 43.2 % — SIGNIFICANT CHANGE UP (ref 39–50)
HGB BLD-MCNC: 14.7 G/DL — SIGNIFICANT CHANGE UP (ref 13–17)
INR BLD: 1.15 RATIO — SIGNIFICANT CHANGE UP (ref 0.88–1.16)
MCHC RBC-ENTMCNC: 31.5 PG — SIGNIFICANT CHANGE UP (ref 27–34)
MCHC RBC-ENTMCNC: 34 GM/DL — SIGNIFICANT CHANGE UP (ref 32–36)
MCV RBC AUTO: 92.7 FL — SIGNIFICANT CHANGE UP (ref 80–100)
PLATELET # BLD AUTO: 152 K/UL — SIGNIFICANT CHANGE UP (ref 150–400)
POTASSIUM SERPL-MCNC: 4 MMOL/L — SIGNIFICANT CHANGE UP (ref 3.5–5.3)
POTASSIUM SERPL-SCNC: 4 MMOL/L — SIGNIFICANT CHANGE UP (ref 3.5–5.3)
PROTHROM AB SERPL-ACNC: 13.2 SEC — HIGH (ref 10–12.9)
RBC # BLD: 4.66 M/UL — SIGNIFICANT CHANGE UP (ref 4.2–5.8)
RBC # FLD: 12.8 % — SIGNIFICANT CHANGE UP (ref 10.3–14.5)
RH IG SCN BLD-IMP: NEGATIVE — SIGNIFICANT CHANGE UP
SODIUM SERPL-SCNC: 136 MMOL/L — SIGNIFICANT CHANGE UP (ref 135–145)
WBC # BLD: 4.6 K/UL — SIGNIFICANT CHANGE UP (ref 3.8–10.5)
WBC # FLD AUTO: 4.6 K/UL — SIGNIFICANT CHANGE UP (ref 3.8–10.5)

## 2019-04-17 PROCEDURE — 99152 MOD SED SAME PHYS/QHP 5/>YRS: CPT

## 2019-04-17 PROCEDURE — 93010 ELECTROCARDIOGRAM REPORT: CPT

## 2019-04-17 PROCEDURE — 33249 INSJ/RPLCMT DEFIB W/LEAD(S): CPT | Mod: 59

## 2019-04-17 PROCEDURE — 71045 X-RAY EXAM CHEST 1 VIEW: CPT | Mod: 26

## 2019-04-17 RX ORDER — ACETAMINOPHEN 500 MG
1000 TABLET ORAL ONCE
Qty: 0 | Refills: 0 | Status: COMPLETED | OUTPATIENT
Start: 2019-04-17 | End: 2019-04-17

## 2019-04-17 RX ORDER — DEXTROSE 50 % IN WATER 50 %
25 SYRINGE (ML) INTRAVENOUS ONCE
Qty: 0 | Refills: 0 | Status: DISCONTINUED | OUTPATIENT
Start: 2019-04-17 | End: 2019-04-18

## 2019-04-17 RX ORDER — SACUBITRIL AND VALSARTAN 24; 26 MG/1; MG/1
1 TABLET, FILM COATED ORAL
Qty: 0 | Refills: 0 | Status: DISCONTINUED | OUTPATIENT
Start: 2019-04-17 | End: 2019-04-18

## 2019-04-17 RX ORDER — DEXTROSE 50 % IN WATER 50 %
12.5 SYRINGE (ML) INTRAVENOUS ONCE
Qty: 0 | Refills: 0 | Status: DISCONTINUED | OUTPATIENT
Start: 2019-04-17 | End: 2019-04-18

## 2019-04-17 RX ORDER — SPIRONOLACTONE 25 MG/1
25 TABLET, FILM COATED ORAL DAILY
Qty: 0 | Refills: 0 | Status: DISCONTINUED | OUTPATIENT
Start: 2019-04-17 | End: 2019-04-18

## 2019-04-17 RX ORDER — INSULIN LISPRO 100/ML
VIAL (ML) SUBCUTANEOUS
Qty: 0 | Refills: 0 | Status: DISCONTINUED | OUTPATIENT
Start: 2019-04-17 | End: 2019-04-18

## 2019-04-17 RX ORDER — ASPIRIN/CALCIUM CARB/MAGNESIUM 324 MG
81 TABLET ORAL DAILY
Qty: 0 | Refills: 0 | Status: DISCONTINUED | OUTPATIENT
Start: 2019-04-17 | End: 2019-04-18

## 2019-04-17 RX ORDER — METOPROLOL TARTRATE 50 MG
50 TABLET ORAL DAILY
Qty: 0 | Refills: 0 | Status: DISCONTINUED | OUTPATIENT
Start: 2019-04-17 | End: 2019-04-18

## 2019-04-17 RX ORDER — GLUCAGON INJECTION, SOLUTION 0.5 MG/.1ML
1 INJECTION, SOLUTION SUBCUTANEOUS ONCE
Qty: 0 | Refills: 0 | Status: DISCONTINUED | OUTPATIENT
Start: 2019-04-17 | End: 2019-04-18

## 2019-04-17 RX ORDER — INFLUENZA VIRUS VACCINE 15; 15; 15; 15 UG/.5ML; UG/.5ML; UG/.5ML; UG/.5ML
0.5 SUSPENSION INTRAMUSCULAR ONCE
Qty: 0 | Refills: 0 | Status: COMPLETED | OUTPATIENT
Start: 2019-04-17 | End: 2019-04-17

## 2019-04-17 RX ORDER — INSULIN LISPRO 100/ML
VIAL (ML) SUBCUTANEOUS AT BEDTIME
Qty: 0 | Refills: 0 | Status: DISCONTINUED | OUTPATIENT
Start: 2019-04-17 | End: 2019-04-18

## 2019-04-17 RX ORDER — SODIUM CHLORIDE 9 MG/ML
1000 INJECTION, SOLUTION INTRAVENOUS
Qty: 0 | Refills: 0 | Status: DISCONTINUED | OUTPATIENT
Start: 2019-04-17 | End: 2019-04-18

## 2019-04-17 RX ORDER — DEXTROSE 50 % IN WATER 50 %
15 SYRINGE (ML) INTRAVENOUS ONCE
Qty: 0 | Refills: 0 | Status: DISCONTINUED | OUTPATIENT
Start: 2019-04-17 | End: 2019-04-18

## 2019-04-17 RX ORDER — ATORVASTATIN CALCIUM 80 MG/1
80 TABLET, FILM COATED ORAL AT BEDTIME
Qty: 0 | Refills: 0 | Status: DISCONTINUED | OUTPATIENT
Start: 2019-04-17 | End: 2019-04-18

## 2019-04-17 RX ORDER — CLOPIDOGREL BISULFATE 75 MG/1
75 TABLET, FILM COATED ORAL DAILY
Qty: 0 | Refills: 0 | Status: DISCONTINUED | OUTPATIENT
Start: 2019-04-17 | End: 2019-04-18

## 2019-04-17 RX ORDER — OXYCODONE HYDROCHLORIDE 5 MG/1
5 TABLET ORAL ONCE
Qty: 0 | Refills: 0 | Status: DISCONTINUED | OUTPATIENT
Start: 2019-04-17 | End: 2019-04-17

## 2019-04-17 RX ORDER — CEFAZOLIN SODIUM 1 G
1000 VIAL (EA) INJECTION EVERY 8 HOURS
Qty: 0 | Refills: 0 | Status: COMPLETED | OUTPATIENT
Start: 2019-04-17 | End: 2019-04-18

## 2019-04-17 RX ADMIN — Medication 1000 MILLIGRAM(S): at 17:59

## 2019-04-17 RX ADMIN — OXYCODONE HYDROCHLORIDE 5 MILLIGRAM(S): 5 TABLET ORAL at 22:30

## 2019-04-17 RX ADMIN — Medication 100 MILLIGRAM(S): at 22:32

## 2019-04-17 RX ADMIN — Medication 1000 MILLIGRAM(S): at 19:01

## 2019-04-17 RX ADMIN — OXYCODONE HYDROCHLORIDE 5 MILLIGRAM(S): 5 TABLET ORAL at 23:00

## 2019-04-17 NOTE — H&P CARDIOLOGY - HISTORY OF PRESENT ILLNESS
This is a 58 y/o  male with PMHX CAD s/p PCI 11/2018) NYHA III severe ICM ,Moderate to severe mitral reguritation , pt has been on LifeVest since PCI, HLD, of T2DM. Pt had repeat TTE in February 2019 which showed severe persistent LV dysfunction at 30%. Pt presents today for Scheduled Defibrillator implantation for the prevention of sudden death. Currently Cp free no sob no lightheadedness or dizziness noted.      < from: Transthoracic Echocardiogram (02.27.19 @ 15:18) >  PROCEDURE: Transthoracic echocardiogram with 2-D, M-Mode  and complete spectral and color flow Doppler.  INDICATION: Cardiomyopathy, unspecified (I42.9)  ------------------------------------------------------------------------  Dimensions:    Normal Values:  LA:     4.7    2.0 - 4.0 cm  Ao:     3.3    2.0 - 3.8 cm  SEPTUM: 0.7    0.6 - 1.2 cm  PWT:    0.7   0.6 - 1.1 cm  LVIDd:  6.3    3.0 - 5.6 cm  LVIDs:  5.4    1.8 - 4.0 cm  Derived variables:  LVMI: 87 g/m2  RWT: 0.22  Fractional short: 14 %  EF (Visual Estimate): 30-35 %  EF (Rivas Method): 31 %Doppler Peak Velocity (m/sec):  AoV=1.2  ------------------------------------------------------------------------  Observations:  Mitral Valve: Tethered mitral valve leaflets with normal  opening. Severe mitral regurgitation.  Aortic Valve/Aorta: Normal trileaflet aortic valve. Peak  transaortic valve gradient equals 6 mm Hg, mean transaortic  valve gradient equals 4 mm Hg, aortic valve velocity time  integral equals 34 cm, estimated aortic valve area equals  2.1 sqcm. Minimal aortic regurgitation.  Peak left  ventricular outflow tract gradient equals 2 mm Hg, mean  gradient is equal to 1 mm Hg, LVOT velocity time integral  equals 19 cm.  Aortic Root: 3.3 cm.  LVOT diameter: 2.2 cm.  Left Atrium: Moderately dilated left atrium.  LA volume  index = 47 cc/m2.  Left Ventricle: Severe global left ventricular systolic  dysfunction with regional variation.  Eccentric left  ventricular hypertrophy (dilated left ventricle with normal  relative wall thickness). Moderate diastolic dysfunction  (Stage II).  Right Heart: Normal right atrium. Normal right ventricular  size and function. TV s' = 12 cm/sec. Normal tricuspid  valve. Mild tricuspid regurgitation. Pulmonic valve not  well visualized. Minimal pulmonic regurgitation.  Pericardium/Pleura: Normal pericardium with no pericardial  effusion.  Hemodynamic: Estimated right atrial pressure is 8 m  < from: Transthoracic Echocardiogram (02.27.19 @ 15:18) >  Estimated right ventricular systolic pressure equals 35 mm  Hg, assuming right atrial pressure equals 8 mm Hg,  consistent with borderline pulmonary hypertension.  ------------------------------------------------------------------------  Conclusions:  1. Tethered mitral valve leaflets with normal opening.  Severe mitral regurgitation.  2. Moderately dilated left atrium.  LA volume index = 47  cc/m2.  3. Eccentric left ventricular hypertrophy (dilated left  ventricle with normal relative wall thickness).  4. Severe global left ventricular systolic dysfunction with  regional variation.  5. Moderate diastolic dysfunction (Stage II).  6. Normal right ventricular size and function. TV s' =12  cm/sec.  7. Estimated right ventricular systolic pressure equals 35  mm Hg, assuming right atrial pressure equals 8 mm Hg,  consistent with borderline pulmonary hypertension.  *** Compared with echocardiogram ([facility]) of 11/16/2018,  findings are grossly similar.  ------------------------------------------------------------------------  Confirmed on  2/27/2019 - 18:00:04 by Chalo Machado M.D.  ------------------------------------------------------------------------    < end of copied text >  < from: Cardiac Cath Lab - Adult (11.15.18 @ 14:16) >  TECHNIQUE: The risks and alternatives of the procedures and conscious  sedation were explained to the patient and informed consent was obtained.  Cardiac catheterization performed electively. Coronary intervention  performed electively.  Local anesthetic given. Right femoral artery access. Right femoral vein  access. Right heart catheterization. The procedure was performed utilizing  a catheter. Left heart catheterization. Ventriculography was performed.  Left coronary artery angiography. The vessel was injected utilizing a  catheter. Right coronary artery angiography. The vessel was injected  utilizing a catheter. RADIATION EXPOSURE: 2.1 min.  CONTRAST GIVEN: Omnipaque 69 ml.  MEDICATIONS GIVEN: Midazolam, 0.5 mg, IV. Fentanyl, 25 mcg, IV.  VENTRICLES: Global left ventricular function was severely depressed. EF  estimated was 15 %.  VALVES: MITRAL VALVE: The mitral valve exhibited severe regurgitation.  CORONARY VESSELS: The coronary circulation is right dominant.  LM:   --  LM: Normal.  LAD: --  Proximal LAD: There was a 90 % stenosis.  CX:   --  Mid circumflex: There was a 100 % stenosis.  RCA:   --  Proximal RCA: There was a 100 % stenosis.  COMPLICATIONS: There were no complications.  DIAGNOSTIC RECOMMENDATIONS: Consultation with a cardiac surgeon will be  obtained for surgical opinion and coronary artery bypass grafting.  Prepared and signed by  Antonio Ruiz M.D.  Signed 11/16/2018 13:25:04    < end of copied text >    < end of copied text > This is a 58 y/o  male with PMHX CAD s/p PCI 11/2018) NYHA III severe ICM ,Moderate to severe mitral reguritation , pt has been on LifeVest since PCI in 2018, HLD, of T2DM. Pt had repeat TTE in February 2019 which showed severe persistent LV dysfunction at 30%. Pt referred to Dr. De La Fuente for AICD implant. Pt presents today for Scheduled Defibrillator implantation for the prevention of sudden death. Currently Cp free no sob no lightheadedness or dizziness noted.   Pt Cardiologist is Sravan Triana      < from: Transthoracic Echocardiogram (02.27.19 @ 15:18) >  PROCEDURE: Transthoracic echocardiogram with 2-D, M-Mode  and complete spectral and color flow Doppler.  INDICATION: Cardiomyopathy, unspecified (I42.9)  ------------------------------------------------------------------------  Dimensions:    Normal Values:  LA:     4.7    2.0 - 4.0 cm  Ao:     3.3    2.0 - 3.8 cm  SEPTUM: 0.7    0.6 - 1.2 cm  PWT:    0.7   0.6 - 1.1 cm  LVIDd:  6.3    3.0 - 5.6 cm  LVIDs:  5.4    1.8 - 4.0 cm  Derived variables:  LVMI: 87 g/m2  RWT: 0.22  Fractional short: 14 %  EF (Visual Estimate): 30-35 %  EF (Rivas Method): 31 %Doppler Peak Velocity (m/sec):  AoV=1.2  ------------------------------------------------------------------------  Observations:  Mitral Valve: Tethered mitral valve leaflets with normal  opening. Severe mitral regurgitation.  Aortic Valve/Aorta: Normal trileaflet aortic valve. Peak  transaortic valve gradient equals 6 mm Hg, mean transaortic  valve gradient equals 4 mm Hg, aortic valve velocity time  integral equals 34 cm, estimated aortic valve area equals  2.1 sqcm. Minimal aortic regurgitation.  Peak left  ventricular outflow tract gradient equals 2 mm Hg, mean  gradient is equal to 1 mm Hg, LVOT velocity time integral  equals 19 cm.  Aortic Root: 3.3 cm.  LVOT diameter: 2.2 cm.  Left Atrium: Moderately dilated left atrium.  LA volume  index = 47 cc/m2.  Left Ventricle: Severe global left ventricular systolic  dysfunction with regional variation.  Eccentric left  ventricular hypertrophy (dilated left ventricle with normal  relative wall thickness). Moderate diastolic dysfunction  (Stage II).  Right Heart: Normal right atrium. Normal right ventricular  size and function. TV s' = 12 cm/sec. Normal tricuspid  valve. Mild tricuspid regurgitation. Pulmonic valve not  well visualized. Minimal pulmonic regurgitation.  Pericardium/Pleura: Normal pericardium with no pericardial  effusion.  Hemodynamic: Estimated right atrial pressure is 8 m  < from: Transthoracic Echocardiogram (02.27.19 @ 15:18) >  Estimated right ventricular systolic pressure equals 35 mm  Hg, assuming right atrial pressure equals 8 mm Hg,  consistent with borderline pulmonary hypertension.  ------------------------------------------------------------------------  Conclusions:  1. Tethered mitral valve leaflets with normal opening.  Severe mitral regurgitation.  2. Moderately dilated left atrium.  LA volume index = 47  cc/m2.  3. Eccentric left ventricular hypertrophy (dilated left  ventricle with normal relative wall thickness).  4. Severe global left ventricular systolic dysfunction with  regional variation.  5. Moderate diastolic dysfunction (Stage II).  6. Normal right ventricular size and function. TV s' =12  cm/sec.  7. Estimated right ventricular systolic pressure equals 35  mm Hg, assuming right atrial pressure equals 8 mm Hg,  consistent with borderline pulmonary hypertension.  *** Compared with echocardiogram ([facility]) of 11/16/2018,  findings are grossly similar.  ------------------------------------------------------------------------  Confirmed on  2/27/2019 - 18:00:04 by Chalo Machado M.D.  ------------------------------------------------------------------------    < end of copied text >  < from: Cardiac Cath Lab - Adult (11.15.18 @ 14:16) >  TECHNIQUE: The risks and alternatives of the procedures and conscious  sedation were explained to the patient and informed consent was obtained.  Cardiac catheterization performed electively. Coronary intervention  performed electively.  Local anesthetic given. Right femoral artery access. Right femoral vein  access. Right heart catheterization. The procedure was performed utilizing  a catheter. Left heart catheterization. Ventriculography was performed.  Left coronary artery angiography. The vessel was injected utilizing a  catheter. Right coronary artery angiography. The vessel was injected  utilizing a catheter. RADIATION EXPOSURE: 2.1 min.  CONTRAST GIVEN: Omnipaque 69 ml.  MEDICATIONS GIVEN: Midazolam, 0.5 mg, IV. Fentanyl, 25 mcg, IV.  VENTRICLES: Global left ventricular function was severely depressed. EF  estimated was 15 %.  VALVES: MITRAL VALVE: The mitral valve exhibited severe regurgitation.  CORONARY VESSELS: The coronary circulation is right dominant.  LM:   --  LM: Normal.  LAD: --  Proximal LAD: There was a 90 % stenosis.  CX:   --  Mid circumflex: There was a 100 % stenosis.  RCA:   --  Proximal RCA: There was a 100 % stenosis.  COMPLICATIONS: There were no complications.  DIAGNOSTIC RECOMMENDATIONS: Consultation with a cardiac surgeon will be  obtained for surgical opinion and coronary artery bypass grafting.  Prepared and signed by  Antonio Ruiz M.D.  Signed 11/16/2018 13:25:04    < end of copied text >    < end of copied text > This is a 58 y/o  male with PMHX CAD s/p PCI 11/2018) NYHA III severe ICM ,Moderate to severe mitral reguritation , pt has been on LifeVest since PCI in 2018, HLD, of T2DM on Metformin uncomplicated and compliant with meds FS glucose today 100. Pt had repeat TTE in February 2019 which showed severe persistent LV dysfunction at 30%. Pt referred to Dr. De La Fuente for AICD implant. Pt presents today for Scheduled Defibrillator implantation for the prevention of sudden death. Currently Cp free no sob no lightheadedness or dizziness noted.   Pt Cardiologist is Sravan Triana      < from: Transthoracic Echocardiogram (02.27.19 @ 15:18) >  PROCEDURE: Transthoracic echocardiogram with 2-D, M-Mode  and complete spectral and color flow Doppler.  INDICATION: Cardiomyopathy, unspecified (I42.9)  ------------------------------------------------------------------------  Dimensions:    Normal Values:  LA:     4.7    2.0 - 4.0 cm  Ao:     3.3    2.0 - 3.8 cm  SEPTUM: 0.7    0.6 - 1.2 cm  PWT:    0.7   0.6 - 1.1 cm  LVIDd:  6.3    3.0 - 5.6 cm  LVIDs:  5.4    1.8 - 4.0 cm  Derived variables:  LVMI: 87 g/m2  RWT: 0.22  Fractional short: 14 %  EF (Visual Estimate): 30-35 %  EF (Rivas Method): 31 %Doppler Peak Velocity (m/sec):  AoV=1.2  ------------------------------------------------------------------------  Observations:  Mitral Valve: Tethered mitral valve leaflets with normal  opening. Severe mitral regurgitation.  Aortic Valve/Aorta: Normal trileaflet aortic valve. Peak  transaortic valve gradient equals 6 mm Hg, mean transaortic  valve gradient equals 4 mm Hg, aortic valve velocity time  integral equals 34 cm, estimated aortic valve area equals  2.1 sqcm. Minimal aortic regurgitation.  Peak left  ventricular outflow tract gradient equals 2 mm Hg, mean  gradient is equal to 1 mm Hg, LVOT velocity time integral  equals 19 cm.  Aortic Root: 3.3 cm.  LVOT diameter: 2.2 cm.  Left Atrium: Moderately dilated left atrium.  LA volume  index = 47 cc/m2.  Left Ventricle: Severe global left ventricular systolic  dysfunction with regional variation.  Eccentric left  ventricular hypertrophy (dilated left ventricle with normal  relative wall thickness). Moderate diastolic dysfunction  (Stage II).  Right Heart: Normal right atrium. Normal right ventricular  size and function. TV s' = 12 cm/sec. Normal tricuspid  valve. Mild tricuspid regurgitation. Pulmonic valve not  well visualized. Minimal pulmonic regurgitation.  Pericardium/Pleura: Normal pericardium with no pericardial  effusion.  Hemodynamic: Estimated right atrial pressure is 8 m  < from: Transthoracic Echocardiogram (02.27.19 @ 15:18) >  Estimated right ventricular systolic pressure equals 35 mm  Hg, assuming right atrial pressure equals 8 mm Hg,  consistent with borderline pulmonary hypertension.  ------------------------------------------------------------------------  Conclusions:  1. Tethered mitral valve leaflets with normal opening.  Severe mitral regurgitation.  2. Moderately dilated left atrium.  LA volume index = 47  cc/m2.  3. Eccentric left ventricular hypertrophy (dilated left  ventricle with normal relative wall thickness).  4. Severe global left ventricular systolic dysfunction with  regional variation.  5. Moderate diastolic dysfunction (Stage II).  6. Normal right ventricular size and function. TV s' =12  cm/sec.  7. Estimated right ventricular systolic pressure equals 35  mm Hg, assuming right atrial pressure equals 8 mm Hg,  consistent with borderline pulmonary hypertension.  *** Compared with echocardiogram ([facility]) of 11/16/2018,  findings are grossly similar.  ------------------------------------------------------------------------  Confirmed on  2/27/2019 - 18:00:04 by Chalo Machado M.D.  ------------------------------------------------------------------------    < end of copied text >  < from: Cardiac Cath Lab - Adult (11.15.18 @ 14:16) >  TECHNIQUE: The risks and alternatives of the procedures and conscious  sedation were explained to the patient and informed consent was obtained.  Cardiac catheterization performed electively. Coronary intervention  performed electively.  Local anesthetic given. Right femoral artery access. Right femoral vein  access. Right heart catheterization. The procedure was performed utilizing  a catheter. Left heart catheterization. Ventriculography was performed.  Left coronary artery angiography. The vessel was injected utilizing a  catheter. Right coronary artery angiography. The vessel was injected  utilizing a catheter. RADIATION EXPOSURE: 2.1 min.  CONTRAST GIVEN: Omnipaque 69 ml.  MEDICATIONS GIVEN: Midazolam, 0.5 mg, IV. Fentanyl, 25 mcg, IV.  VENTRICLES: Global left ventricular function was severely depressed. EF  estimated was 15 %.  VALVES: MITRAL VALVE: The mitral valve exhibited severe regurgitation.  CORONARY VESSELS: The coronary circulation is right dominant.  LM:   --  LM: Normal.  LAD: --  Proximal LAD: There was a 90 % stenosis.  CX:   --  Mid circumflex: There was a 100 % stenosis.  RCA:   --  Proximal RCA: There was a 100 % stenosis.  COMPLICATIONS: There were no complications.  DIAGNOSTIC RECOMMENDATIONS: Consultation with a cardiac surgeon will be  obtained for surgical opinion and coronary artery bypass grafting.  Prepared and signed by  Antonio Ruiz M.D.  Signed 11/16/2018 13:25:04    < end of copied text >    < end of copied text >

## 2019-04-18 ENCOUNTER — TRANSCRIPTION ENCOUNTER (OUTPATIENT)
Age: 59
End: 2019-04-18

## 2019-04-18 VITALS — HEART RATE: 60 BPM | DIASTOLIC BLOOD PRESSURE: 55 MMHG | SYSTOLIC BLOOD PRESSURE: 92 MMHG

## 2019-04-18 DIAGNOSIS — I25.5 ISCHEMIC CARDIOMYOPATHY: ICD-10-CM

## 2019-04-18 DIAGNOSIS — E11.9 TYPE 2 DIABETES MELLITUS WITHOUT COMPLICATIONS: ICD-10-CM

## 2019-04-18 DIAGNOSIS — Z95.810 PRESENCE OF AUTOMATIC (IMPLANTABLE) CARDIAC DEFIBRILLATOR: ICD-10-CM

## 2019-04-18 DIAGNOSIS — E78.5 HYPERLIPIDEMIA, UNSPECIFIED: ICD-10-CM

## 2019-04-18 LAB
ANION GAP SERPL CALC-SCNC: 13 MMOL/L — SIGNIFICANT CHANGE UP (ref 5–17)
BASOPHILS # BLD AUTO: 0 K/UL — SIGNIFICANT CHANGE UP (ref 0–0.2)
BASOPHILS NFR BLD AUTO: 0.3 % — SIGNIFICANT CHANGE UP (ref 0–2)
BUN SERPL-MCNC: 14 MG/DL — SIGNIFICANT CHANGE UP (ref 7–23)
CALCIUM SERPL-MCNC: 9.3 MG/DL — SIGNIFICANT CHANGE UP (ref 8.4–10.5)
CHLORIDE SERPL-SCNC: 103 MMOL/L — SIGNIFICANT CHANGE UP (ref 96–108)
CO2 SERPL-SCNC: 23 MMOL/L — SIGNIFICANT CHANGE UP (ref 22–31)
CREAT SERPL-MCNC: 0.83 MG/DL — SIGNIFICANT CHANGE UP (ref 0.5–1.3)
EOSINOPHIL # BLD AUTO: 0.1 K/UL — SIGNIFICANT CHANGE UP (ref 0–0.5)
EOSINOPHIL NFR BLD AUTO: 1.5 % — SIGNIFICANT CHANGE UP (ref 0–6)
GLUCOSE SERPL-MCNC: 81 MG/DL — SIGNIFICANT CHANGE UP (ref 70–99)
HBA1C BLD-MCNC: 6 % — HIGH (ref 4–5.6)
HCT VFR BLD CALC: 41.6 % — SIGNIFICANT CHANGE UP (ref 39–50)
HGB BLD-MCNC: 14 G/DL — SIGNIFICANT CHANGE UP (ref 13–17)
LYMPHOCYTES # BLD AUTO: 1.6 K/UL — SIGNIFICANT CHANGE UP (ref 1–3.3)
LYMPHOCYTES # BLD AUTO: 24.6 % — SIGNIFICANT CHANGE UP (ref 13–44)
MCHC RBC-ENTMCNC: 30.9 PG — SIGNIFICANT CHANGE UP (ref 27–34)
MCHC RBC-ENTMCNC: 33.7 GM/DL — SIGNIFICANT CHANGE UP (ref 32–36)
MCV RBC AUTO: 91.8 FL — SIGNIFICANT CHANGE UP (ref 80–100)
MONOCYTES # BLD AUTO: 0.6 K/UL — SIGNIFICANT CHANGE UP (ref 0–0.9)
MONOCYTES NFR BLD AUTO: 9.5 % — SIGNIFICANT CHANGE UP (ref 2–14)
NEUTROPHILS # BLD AUTO: 4.1 K/UL — SIGNIFICANT CHANGE UP (ref 1.8–7.4)
NEUTROPHILS NFR BLD AUTO: 64.1 % — SIGNIFICANT CHANGE UP (ref 43–77)
PLATELET # BLD AUTO: 146 K/UL — LOW (ref 150–400)
POTASSIUM SERPL-MCNC: 4 MMOL/L — SIGNIFICANT CHANGE UP (ref 3.5–5.3)
POTASSIUM SERPL-SCNC: 4 MMOL/L — SIGNIFICANT CHANGE UP (ref 3.5–5.3)
RBC # BLD: 4.53 M/UL — SIGNIFICANT CHANGE UP (ref 4.2–5.8)
RBC # FLD: 12.5 % — SIGNIFICANT CHANGE UP (ref 10.3–14.5)
SODIUM SERPL-SCNC: 139 MMOL/L — SIGNIFICANT CHANGE UP (ref 135–145)
WBC # BLD: 6.4 K/UL — SIGNIFICANT CHANGE UP (ref 3.8–10.5)
WBC # FLD AUTO: 6.4 K/UL — SIGNIFICANT CHANGE UP (ref 3.8–10.5)

## 2019-04-18 PROCEDURE — C1769: CPT

## 2019-04-18 PROCEDURE — 71046 X-RAY EXAM CHEST 2 VIEWS: CPT | Mod: 26

## 2019-04-18 PROCEDURE — 82962 GLUCOSE BLOOD TEST: CPT

## 2019-04-18 PROCEDURE — 85027 COMPLETE CBC AUTOMATED: CPT

## 2019-04-18 PROCEDURE — C1898: CPT

## 2019-04-18 PROCEDURE — 93010 ELECTROCARDIOGRAM REPORT: CPT

## 2019-04-18 PROCEDURE — 71046 X-RAY EXAM CHEST 2 VIEWS: CPT

## 2019-04-18 PROCEDURE — 83036 HEMOGLOBIN GLYCOSYLATED A1C: CPT

## 2019-04-18 PROCEDURE — 85610 PROTHROMBIN TIME: CPT

## 2019-04-18 PROCEDURE — C1721: CPT

## 2019-04-18 PROCEDURE — 93005 ELECTROCARDIOGRAM TRACING: CPT

## 2019-04-18 PROCEDURE — 71045 X-RAY EXAM CHEST 1 VIEW: CPT

## 2019-04-18 PROCEDURE — 33249 INSJ/RPLCMT DEFIB W/LEAD(S): CPT | Mod: 59

## 2019-04-18 PROCEDURE — 86900 BLOOD TYPING SEROLOGIC ABO: CPT

## 2019-04-18 PROCEDURE — 86901 BLOOD TYPING SEROLOGIC RH(D): CPT

## 2019-04-18 PROCEDURE — 99153 MOD SED SAME PHYS/QHP EA: CPT

## 2019-04-18 PROCEDURE — 86850 RBC ANTIBODY SCREEN: CPT

## 2019-04-18 PROCEDURE — C1892: CPT

## 2019-04-18 PROCEDURE — 80048 BASIC METABOLIC PNL TOTAL CA: CPT

## 2019-04-18 RX ORDER — OXYCODONE AND ACETAMINOPHEN 5; 325 MG/1; MG/1
1 TABLET ORAL ONCE
Qty: 0 | Refills: 0 | Status: DISCONTINUED | OUTPATIENT
Start: 2019-04-18 | End: 2019-04-18

## 2019-04-18 RX ORDER — METFORMIN HYDROCHLORIDE 850 MG/1
1 TABLET ORAL
Qty: 0 | Refills: 0 | DISCHARGE
Start: 2019-04-18

## 2019-04-18 RX ADMIN — Medication 50 MILLIGRAM(S): at 06:29

## 2019-04-18 RX ADMIN — Medication 81 MILLIGRAM(S): at 06:29

## 2019-04-18 RX ADMIN — OXYCODONE AND ACETAMINOPHEN 1 TABLET(S): 5; 325 TABLET ORAL at 04:38

## 2019-04-18 RX ADMIN — OXYCODONE AND ACETAMINOPHEN 1 TABLET(S): 5; 325 TABLET ORAL at 04:08

## 2019-04-18 RX ADMIN — ATORVASTATIN CALCIUM 80 MILLIGRAM(S): 80 TABLET, FILM COATED ORAL at 07:52

## 2019-04-18 RX ADMIN — Medication 100 MILLIGRAM(S): at 06:30

## 2019-04-18 RX ADMIN — CLOPIDOGREL BISULFATE 75 MILLIGRAM(S): 75 TABLET, FILM COATED ORAL at 06:29

## 2019-04-18 RX ADMIN — SACUBITRIL AND VALSARTAN 1 TABLET(S): 24; 26 TABLET, FILM COATED ORAL at 06:29

## 2019-04-18 RX ADMIN — SPIRONOLACTONE 25 MILLIGRAM(S): 25 TABLET, FILM COATED ORAL at 06:29

## 2019-04-18 NOTE — DISCHARGE NOTE PROVIDER - HOSPITAL COURSE
This is a 58 y/o  male with PMHX CAD s/p PCI 11/2018) NYHA III severe ICM ,Moderate to severe mitral reguritation , pt has been on LifeVest since PCI in 2018, HLD, of T2DM on Metformin uncomplicated and compliant with meds FS glucose today 100. Pt had repeat TTE in February 2019 which showed severe persistent LV dysfunction at 30%. Pt referred to Dr. De La Fuente for AICD implant. Pt presents today for Scheduled Defibrillator implantation for the prevention of sudden death. Currently Chest pain free no SOB no lightheadedness or dizziness noted.  Pt Cardiologist is Sravan Triana .  Pt s/p AICD implant to Left ACW. This is a 60 y/o  male with PMHX CAD s/p PCI 11/2018) NYHA III severe ICM ,Moderate to severe mitral reguritation , pt has been on LifeVest since PCI in 2018, HLD, of T2DM on Metformin uncomplicated and compliant with meds FS glucose today 100. Pt had repeat TTE in February 2019 which showed severe persistent LV dysfunction at 30%. Pt referred to Dr. De La Fuente for AICD implant. Pt presents today for Scheduled Defibrillator implantation for the prevention of sudden death. Currently Chest pain free no SOB no lightheadedness or dizziness noted.  Pt Cardiologist is Sravan Triana .  Pt s/p AICD implant to Left ACW. CXR showed no pneumothorax or effusion, AICD in place. Plan to discharge home f/u with Dr. De La Fuente and Wound check and his cardiologist as scheduled.

## 2019-04-18 NOTE — DISCHARGE NOTE PROVIDER - NSDCFUADDAPPT_GEN_ALL_CORE_FT
AURELIANO site check 5/1/2019 8:45 AM,   Dr. De La Fuente 5/13/2019 10:20 AM   Please call  to confirm appointment before the above dates

## 2019-04-18 NOTE — PROGRESS NOTE ADULT - PROBLEM SELECTOR PLAN 4
Cont DAPT  Cont Entresto  Cont Spironalactone  No signs of overload on exam    Plan for D/C home today

## 2019-04-18 NOTE — PROGRESS NOTE ADULT - ASSESSMENT
HPI:  This is a 60 y/o  male with PMHX CAD s/p PCI 11/2018) NYHA III severe ICM ,Moderate to severe mitral reguritation , pt has been on LifeVest since PCI in 2018, HLD, of T2DM on Metformin uncomplicated and compliant with meds FS glucose today 100. Pt had repeat TTE in February 2019 which showed severe persistent LV dysfunction at 30%. Pt referred to Dr. De La Fuente for AICD implant. Pt presents today for Scheduled Defibrillator implantation for the prevention of sudden death. Currently Cp free no sob no lightheadedness or dizziness noted.  Pt Cardiologist is Sravan Triana .  Pt s/p AICD implant to Left ACW.

## 2019-04-18 NOTE — DISCHARGE NOTE PROVIDER - NSDCCPCAREPLAN_GEN_ALL_CORE_FT
PRINCIPAL DISCHARGE DIAGNOSIS  Diagnosis: S/P implantation of automatic cardioverter/defibrillator (AICD)  Assessment and Plan of Treatment: Appointment: You must have a follow up appointment with the electrophysiology dept. as directed. Please read carefully written instruction sheet for site care, follow up care, activity and reportable signs and symptoms.      SECONDARY DISCHARGE DIAGNOSES  Diagnosis: CHF with cardiomyopathy  Assessment and Plan of Treatment: Take your medications as prescribed. Follow a low-salt, low salt, low cholesterol heart healthy diet. Weigh yourself every day and keep a record; call your doctor if you gain 2 pounds over one to two days or 5 pounds over three days. Get to or maintain a healthy weight; ask your heart failure team for referrals to a registered dietitian if needed. Avoid alcohol. Be active (check with your physician or cardiologist first). Find healthy ways to deal with stress, such as deep breathing, meditation, exercise, and doing hobbies that you enjoy. If you smoke, quit. (A resource to help you stop smoking is the [facility] – phone number [phone].).

## 2019-04-18 NOTE — DISCHARGE NOTE PROVIDER - NSFOLLOWUPCLINICS_GEN_ALL_ED_FT
[facility]  Cardiology  [address] [place] [id number]  Phone: [phone]  Fax:   Follow Up Time:

## 2019-04-18 NOTE — DISCHARGE NOTE NURSING/CASE MANAGEMENT/SOCIAL WORK - NSDCDPATPORTLINK_GEN_ALL_CORE
You can access the [facility] Patient Portal, offered by [facility], by registering with the following website: [email or web address]

## 2019-04-18 NOTE — PROGRESS NOTE ADULT - SUBJECTIVE AND OBJECTIVE BOX
Follow Up:   S/P AICD Implant    Subjective/Observations: Seen and examined.  Resting comfortably in bed arouses easily.  Wound site stable with pain relieved after Percocet.  Pt states was able to sleep.  Denies cp, sob or palpitations.  No signs of orthopnea or PND.       REVIEW OF SYSTEMS: All other review of systems is negative unless indicated above    PAST MEDICAL & SURGICAL HISTORY:  Hyperlipidemia  Cardiomyopathy  Diabetes mellitus  No significant past surgical history      MEDICATIONS  (STANDING):  aspirin enteric coated 81 milliGRAM(s) Oral daily  atorvastatin 80 milliGRAM(s) Oral at bedtime  ceFAZolin   IVPB 1000 milliGRAM(s) IV Intermittent every 8 hours  clopidogrel Tablet 75 milliGRAM(s) Oral daily  dextrose 5%. 1000 milliLiter(s) (50 mL/Hr) IV Continuous <Continuous>  dextrose 50% Injectable 12.5 Gram(s) IV Push once  dextrose 50% Injectable 25 Gram(s) IV Push once  dextrose 50% Injectable 25 Gram(s) IV Push once  insulin lispro (HumaLOG) corrective regimen sliding scale   SubCutaneous three times a day before meals  insulin lispro (HumaLOG) corrective regimen sliding scale   SubCutaneous at bedtime  metoprolol succinate ER 50 milliGRAM(s) Oral daily  sacubitril 49 mG/valsartan 51 mG 1 Tablet(s) Oral two times a day  spironolactone 25 milliGRAM(s) Oral daily    MEDICATIONS  (PRN):  dextrose 40% Gel 15 Gram(s) Oral once PRN Blood Glucose LESS THAN 70 milliGRAM(s)/deciliter  glucagon  Injectable 1 milliGRAM(s) IntraMuscular once PRN Glucose LESS THAN 70 milligrams/deciliter      Allergies    No Known Allergies    Intolerances            Vital Signs Last 24 Hrs  T(C): 36.9 (17 Apr 2019 21:15), Max: 36.9 (17 Apr 2019 21:15)  T(F): 98.4 (17 Apr 2019 21:15), Max: 98.4 (17 Apr 2019 21:15)  HR: 60 (17 Apr 2019 23:15) (57 - 64)  BP: 90/58 (17 Apr 2019 23:15) (85/59 - 111/69)  BP(mean): 83 (17 Apr 2019 11:21) (83 - 83)  RR: 18 (17 Apr 2019 23:15) (16 - 18)  SpO2: 96% (17 Apr 2019 23:15) (96% - 100%)    I&O's Summary    17 Apr 2019 07:01  -  18 Apr 2019 05:48  --------------------------------------------------------  IN: 140 mL / OUT: 0 mL / NET: 140 mL      Weight (kg): 78 (04-17 @ 11:53)    PHYSICAL EXAM:  TELE: Atrial paced/SR 60s  Constitutional: NAD, arouses easily, well-developed  HEENT: Moist Mucous Membranes, Anicteric  Pulmonary: Non-labored, breath sounds are clear bilaterally, No wheezing, rales or rhonchi  Cardiovascular: Regular, S1 and S2, No murmurs, rubs, gallops or clicks  Gastrointestinal: Bowel Sounds present, soft, nontender.   Lymph: No peripheral edema. No lymphadenopathy.  Skin: No visible rashes or ulcers.  Psych:  Mood & affect appropriate  LEFT ACW wound stable    LABS: All Labs Reviewed:                        14.0   6.4   )-----------( 146      ( 18 Apr 2019 05:27 )             41.6                         14.7   4.6   )-----------( 152      ( 17 Apr 2019 11:22 )             43.2     17 Apr 2019 11:22    136    |  98     |  11     ----------------------------<  97     4.0     |  27     |  0.77     Ca    9.9        17 Apr 2019 11:22      PT/INR - ( 17 Apr 2019 11:22 )   PT: 13.2 sec;   INR: 1.15 ratio

## 2019-04-18 NOTE — PROGRESS NOTE ADULT - PROBLEM SELECTOR PLAN 3
Blood sugars well controlled  Cont insulin Sliding scale  restart Metformin as outpt  followup with PCP or Endocrinologist in 1-2 weeks for DM management

## 2019-04-18 NOTE — PROGRESS NOTE ADULT - PROBLEM SELECTOR PLAN 1
Left ACW wound site stable   Teaching to be done this am  Appointment for wound check to be scheduled  CXR this am pending  check ECG

## 2019-04-18 NOTE — DISCHARGE NOTE PROVIDER - CARE PROVIDER_API CALL
Raphael De La Fuente (MD)  Cardiac Electrophysiology; Cardiology; Internal Medicine  [address] [place] [address]  Phone: [phone]  Follow Up Time:

## 2019-04-19 ENCOUNTER — APPOINTMENT (OUTPATIENT)
Dept: CARDIOLOGY | Facility: CLINIC | Age: 59
End: 2019-04-19

## 2019-04-19 ENCOUNTER — TRANSCRIPTION ENCOUNTER (OUTPATIENT)
Age: 59
End: 2019-04-19

## 2019-04-29 ENCOUNTER — OTHER (OUTPATIENT)
Age: 59
End: 2019-04-29

## 2019-05-01 ENCOUNTER — APPOINTMENT (OUTPATIENT)
Dept: ELECTROPHYSIOLOGY | Facility: HOSPITAL | Age: 59
End: 2019-05-01
Payer: COMMERCIAL

## 2019-05-01 VITALS
DIASTOLIC BLOOD PRESSURE: 64 MMHG | HEIGHT: 71 IN | BODY MASS INDEX: 24.08 KG/M2 | WEIGHT: 172 LBS | HEART RATE: 60 BPM | SYSTOLIC BLOOD PRESSURE: 100 MMHG | OXYGEN SATURATION: 100 %

## 2019-05-01 PROBLEM — I42.9 CARDIOMYOPATHY, UNSPECIFIED: Chronic | Status: ACTIVE | Noted: 2019-04-17

## 2019-05-01 PROBLEM — E78.5 HYPERLIPIDEMIA, UNSPECIFIED: Chronic | Status: ACTIVE | Noted: 2019-04-17

## 2019-05-01 PROCEDURE — 99024 POSTOP FOLLOW-UP VISIT: CPT

## 2019-05-01 PROCEDURE — 93283 PRGRMG EVAL IMPLANTABLE DFB: CPT

## 2019-05-07 ENCOUNTER — APPOINTMENT (OUTPATIENT)
Dept: CARDIOLOGY | Facility: CLINIC | Age: 59
End: 2019-05-07
Payer: COMMERCIAL

## 2019-05-07 VITALS
DIASTOLIC BLOOD PRESSURE: 60 MMHG | SYSTOLIC BLOOD PRESSURE: 96 MMHG | HEIGHT: 71 IN | OXYGEN SATURATION: 97 % | HEART RATE: 65 BPM | WEIGHT: 175 LBS | BODY MASS INDEX: 24.5 KG/M2

## 2019-05-07 PROCEDURE — 99214 OFFICE O/P EST MOD 30 MIN: CPT

## 2019-05-07 NOTE — PHYSICAL EXAM
[Well Groomed] : well groomed [General Appearance - Well Developed] : well developed [Normal Appearance] : normal appearance [General Appearance - Well Nourished] : well nourished [General Appearance - In No Acute Distress] : no acute distress [Eyelids - No Xanthelasma] : the eyelids demonstrated no xanthelasmas [Normal Conjunctiva] : the conjunctiva exhibited no abnormalities [No Oral Pallor] : no oral pallor [Respiration, Rhythm And Depth] : normal respiratory rhythm and effort [No Oral Cyanosis] : no oral cyanosis [] : no respiratory distress [Heart Rate And Rhythm] : heart rate and rhythm were normal [Auscultation Breath Sounds / Voice Sounds] : lungs were clear to auscultation bilaterally [Heart Sounds] : normal S1 and S2 [Arterial Pulses Normal] : the arterial pulses were normal [Murmurs] : no murmurs present [Edema] : no peripheral edema present [2+] : left 2+ [Abdomen Tenderness] : non-tender [Bowel Sounds] : normal bowel sounds [Abdomen Soft] : soft [Abnormal Walk] : normal gait [Nail Clubbing] : no clubbing of the fingernails [Affect] : the affect was normal [Oriented To Time, Place, And Person] : oriented to person, place, and time [Impaired Insight] : insight and judgment were intact [Skin Turgor] : normal skin turgor [Mood] : the mood was normal [FreeTextEntry1] : JVP < 6 cm H2O, no HJR

## 2019-05-07 NOTE — REASON FOR VISIT
[Follow-Up - Clinic] : a clinic follow-up of [Heart Failure] : congestive heart failure [Cardiomyopathy] : cardiomyopathy

## 2019-05-07 NOTE — HISTORY OF PRESENT ILLNESS
[FreeTextEntry1] : Mr. Jefferson is a 58 y/o M with h/o CAD s/p Impella-assisted PCI to pLAD 11/20/18 with residual  of LCx and RCA, HFrEF/ICM (EF 15-20%, LVEDD 6 cm), severe MR, T2DM.  4/17/19 AICD implant\par \par Presents today for routine follow up, recent AICD implant without complication.  He denies sob, orthopnea, PND, CP, palpitations,  or issues with ICD. He reports following a low sodium, low carb, low saturated fat diet. He is eating and sleeping well, denies any abdominal discomfort or bloating. Denies any LE edema.  He does not note any limitation in his functional capacity, walks on a treadmill several times a week. He has been taking his medications as directed. He has been having episodes of dizziness and hypotension, potentially related to taking his meds too close together. ICD site C&D\par Recent labs all wnl

## 2019-05-07 NOTE — ASSESSMENT
[FreeTextEntry1] : Mr. Jefferson is a 58 yo M with an ischemic cardiomyopathy, HFrEF (EF 15-20%, LVEDD 6 cm), s/p Impella-Assisted PCI on 11/20/18 w/ 1 stent to pLAD, severe MR, and T2DM who has been doing well since his last visit. He is ACC/AHA Stage C HF with NYHA class I-II symptoms. He currently appears well compensated, euvolemic, and low normotensive.\par \par 1. Ischemic cardiomyopathy HFrEF-\par - Will continue metoprolol 50 mg daily and  Entresto 49/51 mg BID for now given at home his SBP mostly ranges from 80-100s.\par - He will continue spironolactone 25 mg QOD. \par - No diuretic requirement at this time. Will continue to closely monitor weights.\par - He will keep a log of daily weight and B/P \par - Limit salt to less than 2000 mg per day.\par - Limit fluid to less than 2 liters per day.\par - f/u Echo 6/3/19\par \par 2. CAD S/P PCI of LAD- without s/s of ischemia\par - Continue ASA, plavix, and Toprol\par - continue lipitor 80 mg daily; lipid panel within goal \par \par \par RTC 4 weeks with Dr Triana

## 2019-05-13 ENCOUNTER — APPOINTMENT (OUTPATIENT)
Dept: ELECTROPHYSIOLOGY | Facility: CLINIC | Age: 59
End: 2019-05-13
Payer: COMMERCIAL

## 2019-05-13 VITALS
HEART RATE: 61 BPM | DIASTOLIC BLOOD PRESSURE: 56 MMHG | OXYGEN SATURATION: 100 % | SYSTOLIC BLOOD PRESSURE: 86 MMHG | BODY MASS INDEX: 24.5 KG/M2 | HEIGHT: 71 IN | WEIGHT: 175 LBS

## 2019-05-13 PROCEDURE — 99024 POSTOP FOLLOW-UP VISIT: CPT

## 2019-05-13 PROCEDURE — 93000 ELECTROCARDIOGRAM COMPLETE: CPT

## 2019-05-14 NOTE — PROCEDURE
[NSR] : normal sinus rhythm [No] : not [ICD] : Implantable cardioverter-defibrillator [DDD] : DDD [Lead Imp:  ___ohms] : lead impedance was [unfilled] ohms [Sensing Amplitude ___mv] : sensing amplitude was [unfilled] mv [___V @] : [unfilled] V [de-identified] : 4/17/19 [de-identified] : [facility] [___ ms] : [unfilled] ms [de-identified] : 13 years [de-identified] : 58

## 2019-05-14 NOTE — PHYSICAL EXAM
[Normal Appearance] : normal appearance [General Appearance - Well Developed] : well developed [General Appearance - Well Nourished] : well nourished [Well Groomed] : well groomed [No Deformities] : no deformities [Heart Sounds] : normal S1 and S2 [Heart Rate And Rhythm] : heart rate and rhythm were normal [General Appearance - In No Acute Distress] : no acute distress [Murmurs] : no murmurs present [Respiration, Rhythm And Depth] : normal respiratory rhythm and effort [Auscultation Breath Sounds / Voice Sounds] : lungs were clear to auscultation bilaterally [Exaggerated Use Of Accessory Muscles For Inspiration] : no accessory muscle use [Clean] : clean [Well-Healed] : well-healed [Dry] : dry [Abdomen Tenderness] : non-tender [Abdomen Soft] : soft [Nail Clubbing] : no clubbing of the fingernails [Abdomen Mass (___ Cm)] : no abdominal mass palpated [Cyanosis, Localized] : no localized cyanosis [Petechial Hemorrhages (___cm)] : no petechial hemorrhages [Normal Oral Mucosa] : normal oral mucosa [No Oral Cyanosis] : no oral cyanosis [No Oral Pallor] : no oral pallor [Normal Jugular Venous V Waves Present] : normal jugular venous V waves present [No Jugular Venous Pantoja A Waves] : no jugular venous pantoja A waves [Normal Jugular Venous A Waves Present] : normal jugular venous A waves present [Skin Color & Pigmentation] : normal skin color and pigmentation [Gait - Sufficient For Exercise Testing] : the gait was sufficient for exercise testing [Abnormal Walk] : normal gait [] : no rash [Skin Lesions] : no skin lesions [No Venous Stasis] : no venous stasis [No Skin Ulcers] : no skin ulcer [Affect] : the affect was normal [No Xanthoma] : no  xanthoma was observed [Oriented To Time, Place, And Person] : oriented to person, place, and time [Mood] : the mood was normal [No Anxiety] : not feeling anxious

## 2019-05-14 NOTE — HISTORY OF PRESENT ILLNESS
[FreeTextEntry1] : Referring Physician: Sravan Triana MD\par \par Dear Sravan:\par \par Mr. Justin Jefferson was seen in the [facility] today. For our records, please allow me to summarize the history and my findings.\par \par This pleasant 59 year old man has a cardiovascular history significant for CAD s/p PCI (11/2018 after presentation with unstable angina), NYHA III severe ICM, DM, HL, and mod-sev mitral regurgitation. He has been in a [employer] since PCI. Repeat TTE in February showed severe persistent LV dysfunction at 30%. \par \par On 4/1719 he underwent implantatiion of a dual chamber ICD. He presents now for followup. Interrogation shows normal pace/sense function without events.\par \par Mr. Jefferson denies any recent history of chest pain, shortness of breath, palpitations, dizziness, or syncope.\par \par DIAGNOSTIC TEST\par

## 2019-06-03 ENCOUNTER — OUTPATIENT (OUTPATIENT)
Dept: OUTPATIENT SERVICES | Facility: HOSPITAL | Age: 59
LOS: 1 days | End: 2019-06-03
Payer: COMMERCIAL

## 2019-06-03 ENCOUNTER — APPOINTMENT (OUTPATIENT)
Dept: CV DIAGNOSITCS | Facility: HOSPITAL | Age: 59
End: 2019-06-03

## 2019-06-03 DIAGNOSIS — I50.22 CHRONIC SYSTOLIC (CONGESTIVE) HEART FAILURE: ICD-10-CM

## 2019-06-03 PROCEDURE — 93306 TTE W/DOPPLER COMPLETE: CPT

## 2019-06-03 PROCEDURE — 93306 TTE W/DOPPLER COMPLETE: CPT | Mod: 26

## 2019-06-10 ENCOUNTER — OTHER (OUTPATIENT)
Age: 59
End: 2019-06-10

## 2019-06-10 ENCOUNTER — APPOINTMENT (OUTPATIENT)
Dept: CARDIOLOGY | Facility: CLINIC | Age: 59
End: 2019-06-10
Payer: COMMERCIAL

## 2019-06-10 VITALS
BODY MASS INDEX: 24.08 KG/M2 | HEIGHT: 71 IN | OXYGEN SATURATION: 98 % | SYSTOLIC BLOOD PRESSURE: 99 MMHG | WEIGHT: 172 LBS | HEART RATE: 61 BPM | DIASTOLIC BLOOD PRESSURE: 63 MMHG

## 2019-06-10 PROCEDURE — 99214 OFFICE O/P EST MOD 30 MIN: CPT

## 2019-06-11 NOTE — HISTORY OF PRESENT ILLNESS
[FreeTextEntry1] : Mr. Jefferson is a 58 y/o M with ICM/HFrEF (EF initially 15-20%, LVEDD 6 cm; improved to 25%) s/p [place] [employer] ICD 4/19, CAD s/p Impella-Assisted PCI on 11/20/18 w/ 1 stent to pLAD w/ residual  of RCA/LCx, mod-severe MR, and T2DM who presents for f/u. \par \par Since last visit he had an episode of hypotension and meds were spaced out. Reports BPs at times are in 80-90 with once/week lightheadedness. He denies sob, orthopnea, PND, CP, palpitations,  or issues with ICD. He reports following a low sodium, low carb, low saturated fat diet. He is eating and sleeping well, denies any abdominal discomfort or bloating. Denies any LE edema.  He does not note any limitation in his functional capacity, walks on a treadmill several times a week (up to 50 min). He has been taking his medications as directed. Drinking up to 90 ounces/day w/o fluid accumulation.

## 2019-06-11 NOTE — ASSESSMENT
[FreeTextEntry1] : Mr. Jefferson is a 58 yo M with an ischemic cardiomyopathy, HFrEF (EF 15-20%, LVEDD 6 cm; improved to 25%) s/p [place] Sci ICD 4/19, CAD s/p Impella-Assisted PCI on 11/20/18 w/ 1 stent to pLAD w/ residual  of RCA/LCx, mod-severe MR, and T2DM who has been doing well since his last visit. He is ACC/AHA Stage C HF with NYHA class I-II symptoms. He currently appears well compensated, euvolemic/hypovolemic, and low normotensive.\par \par 1. Ischemic cardiomyopathy HFrEF-\par - Will continue metoprolol 50 mg daily and  Entresto 49/51 mg BID for now given at home his SBP mostly ranges from 80-100s.\par - He will continue spironolactone 25 mg QOD. \par - No diuretic requirement at this time. Will continue to closely monitor weights\par - given low JVP, encouraged him to hydrate and liberalize salt intake slightly (has been limiting to <1 gram a day)\par - He will keep a log of daily weight and B/P \par \par 2. CAD S/P PCI of LAD- without s/s of ischemia\par - Continue ASA, plavix, and Toprol\par - continue lipitor 80 mg daily; lipid panel within goal \par - if becomes symptomatic, will pursue cardiac MRI to assess for viability in territory of LCx as may consider PCI to improve mitral regurg; defer for now\par \par RTC NP 2 months, 4 months with me

## 2019-06-11 NOTE — PHYSICAL EXAM
[Normal Appearance] : normal appearance [General Appearance - Well Developed] : well developed [Well Groomed] : well groomed [General Appearance - Well Nourished] : well nourished [General Appearance - In No Acute Distress] : no acute distress [Eyelids - No Xanthelasma] : the eyelids demonstrated no xanthelasmas [Normal Conjunctiva] : the conjunctiva exhibited no abnormalities [No Oral Pallor] : no oral pallor [No Oral Cyanosis] : no oral cyanosis [] : no respiratory distress [Respiration, Rhythm And Depth] : normal respiratory rhythm and effort [Auscultation Breath Sounds / Voice Sounds] : lungs were clear to auscultation bilaterally [Heart Rate And Rhythm] : heart rate and rhythm were normal [Heart Sounds] : normal S1 and S2 [Arterial Pulses Normal] : the arterial pulses were normal [Murmurs] : no murmurs present [2+] : left 2+ [Edema] : no peripheral edema present [Bowel Sounds] : normal bowel sounds [Abdomen Soft] : soft [Abnormal Walk] : normal gait [Skin Turgor] : normal skin turgor [Abdomen Tenderness] : non-tender [Impaired Insight] : insight and judgment were intact [Oriented To Time, Place, And Person] : oriented to person, place, and time [Affect] : the affect was normal [Mood] : the mood was normal [FreeTextEntry1] : JVP < 6 cm H2O, no HJR

## 2019-07-17 ENCOUNTER — APPOINTMENT (OUTPATIENT)
Dept: ELECTROPHYSIOLOGY | Facility: CLINIC | Age: 59
End: 2019-07-17
Payer: COMMERCIAL

## 2019-07-17 PROCEDURE — 93295 DEV INTERROG REMOTE 1/2/MLT: CPT

## 2019-07-17 PROCEDURE — 93296 REM INTERROG EVL PM/IDS: CPT

## 2019-08-01 ENCOUNTER — APPOINTMENT (OUTPATIENT)
Dept: ELECTROPHYSIOLOGY | Facility: CLINIC | Age: 59
End: 2019-08-01
Payer: COMMERCIAL

## 2019-08-01 VITALS
SYSTOLIC BLOOD PRESSURE: 100 MMHG | HEIGHT: 71 IN | WEIGHT: 167.8 LBS | HEART RATE: 64 BPM | OXYGEN SATURATION: 99 % | BODY MASS INDEX: 23.49 KG/M2 | DIASTOLIC BLOOD PRESSURE: 68 MMHG

## 2019-08-01 PROCEDURE — 93283 PRGRMG EVAL IMPLANTABLE DFB: CPT

## 2019-08-13 ENCOUNTER — APPOINTMENT (OUTPATIENT)
Dept: CARDIOLOGY | Facility: CLINIC | Age: 59
End: 2019-08-13
Payer: COMMERCIAL

## 2019-08-13 ENCOUNTER — OTHER (OUTPATIENT)
Age: 59
End: 2019-08-13

## 2019-08-13 VITALS
HEART RATE: 62 BPM | OXYGEN SATURATION: 99 % | HEIGHT: 71 IN | DIASTOLIC BLOOD PRESSURE: 71 MMHG | BODY MASS INDEX: 23.24 KG/M2 | WEIGHT: 166 LBS | SYSTOLIC BLOOD PRESSURE: 109 MMHG

## 2019-08-13 PROCEDURE — 99214 OFFICE O/P EST MOD 30 MIN: CPT

## 2019-08-13 RX ORDER — TRIAMCINOLONE ACETONIDE 1 MG/G
0.1 OINTMENT TOPICAL
Qty: 30 | Refills: 0 | Status: DISCONTINUED | COMMUNITY
Start: 2019-02-26 | End: 2019-08-13

## 2019-08-15 LAB
ALBUMIN SERPL ELPH-MCNC: 4.9 G/DL
ALP BLD-CCNC: 71 U/L
ALT SERPL-CCNC: 19 U/L
ANION GAP SERPL CALC-SCNC: 12 MMOL/L
AST SERPL-CCNC: 20 U/L
BILIRUB SERPL-MCNC: 0.8 MG/DL
BUN SERPL-MCNC: 16 MG/DL
CALCIUM SERPL-MCNC: 10.4 MG/DL
CHLORIDE SERPL-SCNC: 99 MMOL/L
CO2 SERPL-SCNC: 26 MMOL/L
CREAT SERPL-MCNC: 0.87 MG/DL
GLUCOSE SERPL-MCNC: 96 MG/DL
MAGNESIUM SERPL-MCNC: 1.9 MG/DL
NT-PROBNP SERPL-MCNC: 403 PG/ML
POTASSIUM SERPL-SCNC: 4.7 MMOL/L
PROT SERPL-MCNC: 7.2 G/DL
SODIUM SERPL-SCNC: 137 MMOL/L

## 2019-08-17 NOTE — HISTORY OF PRESENT ILLNESS
[FreeTextEntry1] : Mr. Jefferson is a 60 y/o M with ICM/HFrEF (EF initially 15-20%, LVEDD 6 cm; improved to 25%) s/p [place] Sci ICD 4/19, CAD s/p Impella-Assisted PCI on 11/20/18 w/ 1 stent to pLAD w/ residual  of RCA/LCx, mod-severe MR, and T2DM.\par \par Presents today for routine follow up, looks and feels well.  He denies any sob, orthopnea or PND. He denies any chest pain, palpitations, dizziness or ICD shocks. SBP at times in the 80's ping well. He is eating well,  denies any abdominal discomfort or bloating. Denies any LE edema.  He does not note any limitation in his functional capacity, walks on a treadmill several times a week (up to 45min). He has been taking his medications as directed. There were no med changes last visit.

## 2019-08-17 NOTE — PHYSICAL EXAM
[General Appearance - Well Developed] : well developed [Normal Appearance] : normal appearance [Well Groomed] : well groomed [General Appearance - Well Nourished] : well nourished [General Appearance - In No Acute Distress] : no acute distress [Normal Conjunctiva] : the conjunctiva exhibited no abnormalities [Eyelids - No Xanthelasma] : the eyelids demonstrated no xanthelasmas [No Oral Pallor] : no oral pallor [No Oral Cyanosis] : no oral cyanosis [Respiration, Rhythm And Depth] : normal respiratory rhythm and effort [] : no respiratory distress [Heart Rate And Rhythm] : heart rate and rhythm were normal [Auscultation Breath Sounds / Voice Sounds] : lungs were clear to auscultation bilaterally [Murmurs] : no murmurs present [Heart Sounds] : normal S1 and S2 [Edema] : no peripheral edema present [Arterial Pulses Normal] : the arterial pulses were normal [2+] : left 2+ [Bowel Sounds] : normal bowel sounds [Abdomen Tenderness] : non-tender [Abdomen Soft] : soft [Abnormal Walk] : normal gait [Skin Turgor] : normal skin turgor [Oriented To Time, Place, And Person] : oriented to person, place, and time [Impaired Insight] : insight and judgment were intact [Mood] : the mood was normal [Affect] : the affect was normal [FreeTextEntry1] : JVP < 6 cm H2O, no HJR

## 2019-08-17 NOTE — ASSESSMENT
[FreeTextEntry1] : Mr. Jefferson is a 58 yo M with an ischemic cardiomyopathy, HFrEF (EF 15-20%, LVEDD 6 cm; improved to 25%) s/p [place] Sci ICD 4/19, CAD s/p Impella-Assisted PCI on 11/20/18 w/ 1 stent to pLAD w/ residual  of RCA/LCx, mod-severe MR, and T2DM who has been doing well since his last visit. He is ACC/AHA Stage C HF with NYHA class I-II symptoms. He currently appears well compensated, euvolemic/hypovolemic, and normotensive.\par \par 1. Ischemic cardiomyopathy HFrEF-\par - Will continue metoprolol 50 mg daily and  Entresto 49/51 mg BID for now given at home his SBP mostly ranges from 80-100s.\par - He will continue spironolactone 25 mg QOD. \par - No diuretic requirement at this time. Will continue to closely monitor weights\par - He will keep a log of daily weight and B/P\par - Labs today wnl\par \par 2. CAD S/P PCI of LAD- without s/s of ischemia\par - Continue ASA, plavix, and Toprol\par - continue lipitor 80 mg daily; lipid panel within goal \par - if becomes symptomatic, will pursue cardiac MRI to assess for viability in territory of LCx as may consider PCI to improve mitral regurg; defer for now\par \par RTC 2 months with Dr. Triana

## 2019-11-04 ENCOUNTER — APPOINTMENT (OUTPATIENT)
Dept: ELECTROPHYSIOLOGY | Facility: CLINIC | Age: 59
End: 2019-11-04
Payer: COMMERCIAL

## 2019-11-04 PROCEDURE — 93295 DEV INTERROG REMOTE 1/2/MLT: CPT

## 2019-11-04 PROCEDURE — 93296 REM INTERROG EVL PM/IDS: CPT

## 2019-11-18 ENCOUNTER — APPOINTMENT (OUTPATIENT)
Dept: CARDIOLOGY | Facility: CLINIC | Age: 59
End: 2019-11-18
Payer: COMMERCIAL

## 2019-11-18 ENCOUNTER — NON-APPOINTMENT (OUTPATIENT)
Age: 59
End: 2019-11-18

## 2019-11-18 VITALS
HEART RATE: 74 BPM | HEIGHT: 71 IN | BODY MASS INDEX: 24.5 KG/M2 | OXYGEN SATURATION: 99 % | RESPIRATION RATE: 12 BRPM | DIASTOLIC BLOOD PRESSURE: 62 MMHG | SYSTOLIC BLOOD PRESSURE: 98 MMHG | WEIGHT: 175 LBS

## 2019-11-18 PROCEDURE — 93000 ELECTROCARDIOGRAM COMPLETE: CPT

## 2019-11-18 PROCEDURE — 99214 OFFICE O/P EST MOD 30 MIN: CPT

## 2019-11-18 NOTE — HISTORY OF PRESENT ILLNESS
[FreeTextEntry1] : Mr. Jefferson is a 60 y/o M with ICM/HFrEF (EF initially 15-20%, LVEDD 6 cm; improved to 25%) s/p [place] [employer] ICD 4/19, CAD s/p Impella-Assisted PCI on 11/20/18 w/ 1 stent to pLAD w/ residual  of RCA/LCx, mod-severe MR, and T2DM.\par \par Since last visit has been doing welll. He denies any sob, orthopnea or PND. He denies any chest pain, palpitations, dizziness or ICD shocks. Reports some discomfort along ICD site at rest but no relation to activity. Denies any LE edema.  He does not note any limitation in his functional capacity, walks on a treadmill several times a week (up to 45min at 2 mph). He has been taking his medications as directed. Able to go up 2 flights of stairs w/o difficulty. \par \par Weight has been uptrending likely 2/2 caloric intake. Denies lightheadedness/dizziness. BP ranging high 80s to 100s. Stays hydrated (drinks up to 80 ounces/day). \par

## 2019-11-18 NOTE — PHYSICAL EXAM
[Normal Appearance] : normal appearance [General Appearance - Well Developed] : well developed [Well Groomed] : well groomed [General Appearance - Well Nourished] : well nourished [Normal Conjunctiva] : the conjunctiva exhibited no abnormalities [General Appearance - In No Acute Distress] : no acute distress [No Oral Pallor] : no oral pallor [Eyelids - No Xanthelasma] : the eyelids demonstrated no xanthelasmas [] : no respiratory distress [No Oral Cyanosis] : no oral cyanosis [Respiration, Rhythm And Depth] : normal respiratory rhythm and effort [Auscultation Breath Sounds / Voice Sounds] : lungs were clear to auscultation bilaterally [Heart Rate And Rhythm] : heart rate and rhythm were normal [Murmurs] : no murmurs present [Heart Sounds] : normal S1 and S2 [Edema] : no peripheral edema present [Arterial Pulses Normal] : the arterial pulses were normal [2+] : left 2+ [Bowel Sounds] : normal bowel sounds [Abdomen Tenderness] : non-tender [Abdomen Soft] : soft [Abnormal Walk] : normal gait [Skin Turgor] : normal skin turgor [Oriented To Time, Place, And Person] : oriented to person, place, and time [Impaired Insight] : insight and judgment were intact [Affect] : the affect was normal [Mood] : the mood was normal [FreeTextEntry1] : JVP < 6 cm H2O, no HJR

## 2019-11-18 NOTE — ASSESSMENT
[FreeTextEntry1] : Mr. Jefferson is a 58 yo M with an ischemic cardiomyopathy, HFrEF (EF 15-20%, LVEDD 6 cm; improved to 25%) s/p [place] Sci ICD 4/19, CAD s/p Impella-Assisted PCI on 11/20/18 w/ 1 stent to pLAD w/ residual  of RCA/LCx, mod-severe MR, and T2DM who has been doing well since his last visit. He is ACC/AHA Stage C HF with NYHA class I-II symptoms. He currently appears well compensated, euvolemic/hypovolemic, and normotensive.\par \par 1. Ischemic cardiomyopathy HFrEF-\par - Will continue metoprolol 50 mg daily and  Entresto 49/51 mg BID for now given at home his SBP mostly ranges from 80-100s. HR at goal in 60s and unable to uptitrate beta-blocker\par - He will continue spironolactone 25 mg QOD. \par - No diuretic requirement at this time. Will continue to closely monitor weights\par - He will keep a log of daily weight and B/P\par - will do labs today\par - repeat TTE in December to assess if mitral regurg is improved\par \par 2. CAD S/P PCI of LAD- without s/s of ischemia\par - Continue ASA, plavix, and Toprol; while he may be able to discontinue plavix given unrevascularized LCx territory will continue DAPT as he has tolerated w/o bleeding issues\par - continue lipitor 80 mg daily; repeat lipid panel\par \par RTC 2 months with NP, 4 months with me

## 2019-11-25 LAB
ALBUMIN SERPL ELPH-MCNC: 4.9 G/DL
ALP BLD-CCNC: 68 U/L
ALT SERPL-CCNC: 24 U/L
ANION GAP SERPL CALC-SCNC: 12 MMOL/L
AST SERPL-CCNC: 24 U/L
BASOPHILS # BLD AUTO: 0.02 K/UL
BASOPHILS NFR BLD AUTO: 0.3 %
BILIRUB SERPL-MCNC: 0.8 MG/DL
BUN SERPL-MCNC: 13 MG/DL
CALCIUM SERPL-MCNC: 9.9 MG/DL
CHLORIDE SERPL-SCNC: 96 MMOL/L
CHOLEST SERPL-MCNC: 99 MG/DL
CHOLEST/HDLC SERPL: 1.9 RATIO
CO2 SERPL-SCNC: 24 MMOL/L
CREAT SERPL-MCNC: 0.81 MG/DL
EOSINOPHIL # BLD AUTO: 0.07 K/UL
EOSINOPHIL NFR BLD AUTO: 1.2 %
ESTIMATED AVERAGE GLUCOSE: 120 MG/DL
GLUCOSE SERPL-MCNC: 81 MG/DL
HBA1C MFR BLD HPLC: 5.8 %
HCT VFR BLD CALC: 43.8 %
HDLC SERPL-MCNC: 51 MG/DL
HGB BLD-MCNC: 14.7 G/DL
IMM GRANULOCYTES NFR BLD AUTO: 0.3 %
LDLC SERPL CALC-MCNC: 34 MG/DL
LYMPHOCYTES # BLD AUTO: 1.55 K/UL
LYMPHOCYTES NFR BLD AUTO: 27 %
MAGNESIUM SERPL-MCNC: 1.9 MG/DL
MAN DIFF?: NORMAL
MCHC RBC-ENTMCNC: 30.5 PG
MCHC RBC-ENTMCNC: 33.6 GM/DL
MCV RBC AUTO: 90.9 FL
MONOCYTES # BLD AUTO: 0.75 K/UL
MONOCYTES NFR BLD AUTO: 13.1 %
NEUTROPHILS # BLD AUTO: 3.33 K/UL
NEUTROPHILS NFR BLD AUTO: 58.1 %
NT-PROBNP SERPL-MCNC: 379 PG/ML
PLATELET # BLD AUTO: 169 K/UL
POTASSIUM SERPL-SCNC: 4.7 MMOL/L
PROT SERPL-MCNC: 6.9 G/DL
RBC # BLD: 4.82 M/UL
RBC # FLD: 12.9 %
SODIUM SERPL-SCNC: 131 MMOL/L
TRIGL SERPL-MCNC: 72 MG/DL
TSH SERPL-ACNC: 1.64 UIU/ML
WBC # FLD AUTO: 5.74 K/UL

## 2019-12-09 ENCOUNTER — APPOINTMENT (OUTPATIENT)
Dept: CV DIAGNOSITCS | Facility: HOSPITAL | Age: 59
End: 2019-12-09

## 2019-12-09 ENCOUNTER — OUTPATIENT (OUTPATIENT)
Dept: OUTPATIENT SERVICES | Facility: HOSPITAL | Age: 59
LOS: 1 days | End: 2019-12-09
Payer: COMMERCIAL

## 2019-12-09 DIAGNOSIS — I50.22 CHRONIC SYSTOLIC (CONGESTIVE) HEART FAILURE: ICD-10-CM

## 2019-12-09 PROCEDURE — 93306 TTE W/DOPPLER COMPLETE: CPT | Mod: 26

## 2019-12-09 PROCEDURE — C8929: CPT

## 2020-01-14 ENCOUNTER — APPOINTMENT (OUTPATIENT)
Dept: HEART FAILURE | Facility: CLINIC | Age: 60
End: 2020-01-14
Payer: COMMERCIAL

## 2020-01-14 VITALS
SYSTOLIC BLOOD PRESSURE: 96 MMHG | OXYGEN SATURATION: 100 % | HEIGHT: 71 IN | HEART RATE: 60 BPM | RESPIRATION RATE: 12 BRPM | DIASTOLIC BLOOD PRESSURE: 58 MMHG | WEIGHT: 178 LBS | BODY MASS INDEX: 24.92 KG/M2

## 2020-01-14 PROCEDURE — 99214 OFFICE O/P EST MOD 30 MIN: CPT

## 2020-01-16 NOTE — ASSESSMENT
[FreeTextEntry1] : Mr. Jefferson is a 60 yo M with an ischemic cardiomyopathy, HFrEF (EF 15-20%, LVEDD 6 cm; improved to 25%) s/p [place] Sci ICD 4/19, CAD s/p Impella-Assisted PCI on 11/20/18 w/ 1 stent to pLAD w/ residual  of RCA/LCx, mod-severe MR, and T2DM who has been doing well since his last visit. He is ACC/AHA Stage C HF with NYHA class I-II symptoms. He currently appears well compensated, euvolemic and low normotensive.\par \par 1. Ischemic cardiomyopathy HFrEF-\par - Increase Toprol to 75mg bid and continue Entresto 49/51 mg BID,  \par - He will continue spironolactone 25 mg QOD. \par - Stable on no diuretics. Will continue to closely monitor weights\par - Schedule CPET \par - He will keep a log of daily weight and B/P\par \par \par 2. CAD S/P PCI of LAD- without s/s of ischemia\par - Continue ASA, plavix, and Toprol; \par - continue lipitor 80 mg daily\par \par RTC 2 months with Dr. Triana

## 2020-01-16 NOTE — HISTORY OF PRESENT ILLNESS
[FreeTextEntry1] : Mr. Jefferson is a 60 y/o M with ICM/HFrEF (EF initially 15-20%, LVEDD 6 cm; improved to 25%) s/p [place] Sci ICD 4/19, CAD s/p Impella-Assisted PCI on 11/20/18 w/ 1 stent to pLAD w/ residual  of RCA/LCx, mod-severe MR, and T2DM.\par \par Since last visit he has been feeling well.  He denies any sob, orthopnea or PND. He denies any chest pain, palpitations, dizziness or ICD shocks.  He does not note any limitation in his functional capacity, walks on a treadmill 4-5 times a week (up to 45min at 2 mph). He has been taking his medications as directed. Able to go up 2 flights of stairs w/o difficulty.   He has no abdominal discomfort, distention or LE edema.  A recent echo showed no improvement in LV function and Toprol was increased to 50mg bid, which he has tolerated.  \par SBP at home ranging high 80s to 100s, HR 60's - 70's. Stays hydrated (drinks up to 80 ounces/day). \par \par 12/9/19 TTE EF 25% LVIDd 6.5 mod-sev eccentric MR, no change from previous echo 6/19\par

## 2020-01-16 NOTE — PHYSICAL EXAM
[General Appearance - Well Developed] : well developed [Normal Appearance] : normal appearance [General Appearance - Well Nourished] : well nourished [Well Groomed] : well groomed [General Appearance - In No Acute Distress] : no acute distress [Normal Conjunctiva] : the conjunctiva exhibited no abnormalities [Eyelids - No Xanthelasma] : the eyelids demonstrated no xanthelasmas [No Oral Cyanosis] : no oral cyanosis [No Oral Pallor] : no oral pallor [] : no respiratory distress [Respiration, Rhythm And Depth] : normal respiratory rhythm and effort [Auscultation Breath Sounds / Voice Sounds] : lungs were clear to auscultation bilaterally [Heart Rate And Rhythm] : heart rate and rhythm were normal [Murmurs] : no murmurs present [Heart Sounds] : normal S1 and S2 [Arterial Pulses Normal] : the arterial pulses were normal [Edema] : no peripheral edema present [2+] : right 2+ [Bowel Sounds] : normal bowel sounds [Abdomen Soft] : soft [Abdomen Tenderness] : non-tender [Skin Turgor] : normal skin turgor [Abnormal Walk] : normal gait [Oriented To Time, Place, And Person] : oriented to person, place, and time [Impaired Insight] : insight and judgment were intact [Mood] : the mood was normal [Affect] : the affect was normal [Nail Clubbing] : no clubbing of the fingernails [Cyanosis, Localized] : no localized cyanosis [FreeTextEntry1] : JVP < 6 cm H2O, no HJR

## 2020-02-05 ENCOUNTER — APPOINTMENT (OUTPATIENT)
Dept: ELECTROPHYSIOLOGY | Facility: CLINIC | Age: 60
End: 2020-02-05
Payer: COMMERCIAL

## 2020-02-05 PROCEDURE — 93296 REM INTERROG EVL PM/IDS: CPT

## 2020-02-05 PROCEDURE — 93295 DEV INTERROG REMOTE 1/2/MLT: CPT

## 2020-02-24 ENCOUNTER — RX RENEWAL (OUTPATIENT)
Age: 60
End: 2020-02-24

## 2020-03-09 ENCOUNTER — NON-APPOINTMENT (OUTPATIENT)
Age: 60
End: 2020-03-09

## 2020-03-09 ENCOUNTER — APPOINTMENT (OUTPATIENT)
Dept: HEART FAILURE | Facility: CLINIC | Age: 60
End: 2020-03-09
Payer: COMMERCIAL

## 2020-03-09 VITALS
WEIGHT: 180 LBS | SYSTOLIC BLOOD PRESSURE: 93 MMHG | HEIGHT: 71 IN | DIASTOLIC BLOOD PRESSURE: 57 MMHG | HEART RATE: 62 BPM | BODY MASS INDEX: 25.2 KG/M2 | RESPIRATION RATE: 12 BRPM | OXYGEN SATURATION: 100 %

## 2020-03-09 PROCEDURE — 99214 OFFICE O/P EST MOD 30 MIN: CPT

## 2020-03-09 PROCEDURE — 93000 ELECTROCARDIOGRAM COMPLETE: CPT

## 2020-03-09 RX ORDER — CLOPIDOGREL BISULFATE 75 MG/1
75 TABLET, FILM COATED ORAL DAILY
Qty: 90 | Refills: 3 | Status: DISCONTINUED | COMMUNITY
Start: 2018-12-05 | End: 2020-03-09

## 2020-03-10 LAB
ALBUMIN SERPL ELPH-MCNC: 5 G/DL
ALP BLD-CCNC: 67 U/L
ALT SERPL-CCNC: 25 U/L
ANION GAP SERPL CALC-SCNC: 11 MMOL/L
AST SERPL-CCNC: 21 U/L
BASOPHILS # BLD AUTO: 0.02 K/UL
BASOPHILS NFR BLD AUTO: 0.4 %
BILIRUB SERPL-MCNC: 0.9 MG/DL
BUN SERPL-MCNC: 12 MG/DL
CALCIUM SERPL-MCNC: 9.9 MG/DL
CHLORIDE SERPL-SCNC: 101 MMOL/L
CHOLEST SERPL-MCNC: 97 MG/DL
CHOLEST/HDLC SERPL: 1.9 RATIO
CO2 SERPL-SCNC: 25 MMOL/L
CREAT SERPL-MCNC: 0.83 MG/DL
EOSINOPHIL # BLD AUTO: 0.06 K/UL
EOSINOPHIL NFR BLD AUTO: 1.1 %
ESTIMATED AVERAGE GLUCOSE: 123 MG/DL
GLUCOSE SERPL-MCNC: 86 MG/DL
HBA1C MFR BLD HPLC: 5.9 %
HCT VFR BLD CALC: 43.6 %
HDLC SERPL-MCNC: 51 MG/DL
HGB BLD-MCNC: 14.8 G/DL
IMM GRANULOCYTES NFR BLD AUTO: 0.4 %
LDLC SERPL CALC-MCNC: 37 MG/DL
LYMPHOCYTES # BLD AUTO: 1.52 K/UL
LYMPHOCYTES NFR BLD AUTO: 27.7 %
MAGNESIUM SERPL-MCNC: 2 MG/DL
MAN DIFF?: NORMAL
MCHC RBC-ENTMCNC: 31.1 PG
MCHC RBC-ENTMCNC: 33.9 GM/DL
MCV RBC AUTO: 91.6 FL
MONOCYTES # BLD AUTO: 0.71 K/UL
MONOCYTES NFR BLD AUTO: 13 %
NEUTROPHILS # BLD AUTO: 3.15 K/UL
NEUTROPHILS NFR BLD AUTO: 57.4 %
NT-PROBNP SERPL-MCNC: 426 PG/ML
PLATELET # BLD AUTO: 161 K/UL
POTASSIUM SERPL-SCNC: 4.7 MMOL/L
PROT SERPL-MCNC: 6.9 G/DL
RBC # BLD: 4.76 M/UL
RBC # FLD: 13.5 %
SODIUM SERPL-SCNC: 137 MMOL/L
TRIGL SERPL-MCNC: 46 MG/DL
WBC # FLD AUTO: 5.48 K/UL

## 2020-03-10 NOTE — HISTORY OF PRESENT ILLNESS
[FreeTextEntry1] : Mr. Jefferson is a 61 y/o M with ICM/HFrEF (EF initially 15-20%, LVEDD 6 cm; improved to 25%) s/p [place] Sci ICD 4/19, CAD s/p Impella-Assisted PCI on 11/20/18 w/ 1 stent to pLAD w/ residual  of RCA/LCx, mod-severe MR, and T2DM (A1c 5.8 11/19).\par \par Since last visit he has been feeling well.  He denies any sob, orthopnea or PND. He denies any chest pain, palpitations, dizziness or ICD shocks.  He does not note any limitation in his functional capacity, walks on a treadmill 4-5 times a week (up to 60 min at top speed of 2.8 mph). He has been taking his medications as directed. Able to go up 2 flights of stairs w/o difficulty. He has no abdominal discomfort, distention or LE edema. SBP at home ranging high 80s to 100s, HR 60's - 70's. Stays hydrated (drinks up to 80 ounces/day).

## 2020-03-10 NOTE — PHYSICAL EXAM
[General Appearance - Well Developed] : well developed [Normal Appearance] : normal appearance [Well Groomed] : well groomed [General Appearance - Well Nourished] : well nourished [General Appearance - In No Acute Distress] : no acute distress [Normal Conjunctiva] : the conjunctiva exhibited no abnormalities [Eyelids - No Xanthelasma] : the eyelids demonstrated no xanthelasmas [No Oral Pallor] : no oral pallor [No Oral Cyanosis] : no oral cyanosis [] : no respiratory distress [Respiration, Rhythm And Depth] : normal respiratory rhythm and effort [Auscultation Breath Sounds / Voice Sounds] : lungs were clear to auscultation bilaterally [Heart Rate And Rhythm] : heart rate and rhythm were normal [Heart Sounds] : normal S1 and S2 [Murmurs] : no murmurs present [Arterial Pulses Normal] : the arterial pulses were normal [Edema] : no peripheral edema present [2+] : left 2+ [Bowel Sounds] : normal bowel sounds [Abdomen Soft] : soft [Abdomen Tenderness] : non-tender [Abnormal Walk] : normal gait [Nail Clubbing] : no clubbing of the fingernails [Cyanosis, Localized] : no localized cyanosis [Skin Turgor] : normal skin turgor [Oriented To Time, Place, And Person] : oriented to person, place, and time [Impaired Insight] : insight and judgment were intact [Affect] : the affect was normal [Mood] : the mood was normal [FreeTextEntry1] : no edema

## 2020-03-10 NOTE — ASSESSMENT
[FreeTextEntry1] : Mr. Jefferson is a 58 yo M with an ischemic cardiomyopathy, HFrEF (EF 15-20%, LVEDD 6 cm; improved to 25%) s/p [place] Sci ICD 4/19, CAD s/p Impella-Assisted PCI on 11/20/18 w/ 1 stent to pLAD w/ residual  of RCA/LCx, mod-severe MR, and T2DM who has been doing well since his last visit. He is ACC/AHA Stage C HF with NYHA class I-II symptoms. He currently appears well compensated, euvolemic and low normotensive.\par \par 1. Ischemic cardiomyopathy HFrEF-\par - Increase Toprol to 75mg bid\par - Continue Entresto 49/51 mg BID; unable to uptitrate due to BP in 80-90s (asymptomatic)\par - Continue spironolactone 25 mg QOD. \par - Stable on no diuretics. Will continue to closely monitor weights\par - Schedule CPET to better assess his function and prognosis as well as to determine whether MR needs intervention\par - Schedule viability test (to evaluate if there is any viability in posterior wall which is severely hypokinetic. If there is viable myocardium in the posterior wall, we can consider revascularization of LCX/RCA which may help with reducing the degree of mitral regurgitation)\par - He will keep a log of daily weight and B/P\par \par 2. CAD S/P PCI of LAD- without s/s of ischemia\par - Continue ASA and Toprol\par - Discontinue clopidogrel since it's been one year since the last stent\par - continue lipitor 80 mg daily\par \par RTC 6 weeks with NP, 12 weeks with me

## 2020-03-23 ENCOUNTER — RX RENEWAL (OUTPATIENT)
Age: 60
End: 2020-03-23

## 2020-03-25 ENCOUNTER — OUTPATIENT (OUTPATIENT)
Dept: OUTPATIENT SERVICES | Facility: HOSPITAL | Age: 60
LOS: 1 days | End: 2020-03-25
Payer: COMMERCIAL

## 2020-03-25 ENCOUNTER — APPOINTMENT (OUTPATIENT)
Dept: CV DIAGNOSTICS | Facility: HOSPITAL | Age: 60
End: 2020-03-25

## 2020-03-25 DIAGNOSIS — I25.10 ATHEROSCLEROTIC HEART DISEASE OF NATIVE CORONARY ARTERY WITHOUT ANGINA PECTORIS: ICD-10-CM

## 2020-03-26 PROCEDURE — A9505: CPT

## 2020-03-26 PROCEDURE — 78452 HT MUSCLE IMAGE SPECT MULT: CPT | Mod: 26

## 2020-03-26 PROCEDURE — 78452 HT MUSCLE IMAGE SPECT MULT: CPT

## 2020-04-15 ENCOUNTER — APPOINTMENT (OUTPATIENT)
Dept: HEART FAILURE | Facility: CLINIC | Age: 60
End: 2020-04-15

## 2020-05-07 ENCOUNTER — APPOINTMENT (OUTPATIENT)
Dept: ELECTROPHYSIOLOGY | Facility: CLINIC | Age: 60
End: 2020-05-07
Payer: COMMERCIAL

## 2020-05-07 PROCEDURE — 93296 REM INTERROG EVL PM/IDS: CPT

## 2020-05-07 PROCEDURE — 93295 DEV INTERROG REMOTE 1/2/MLT: CPT

## 2020-06-08 ENCOUNTER — APPOINTMENT (OUTPATIENT)
Dept: HEART FAILURE | Facility: CLINIC | Age: 60
End: 2020-06-08

## 2020-06-22 ENCOUNTER — NON-APPOINTMENT (OUTPATIENT)
Age: 60
End: 2020-06-22

## 2020-06-22 ENCOUNTER — APPOINTMENT (OUTPATIENT)
Dept: HEART FAILURE | Facility: CLINIC | Age: 60
End: 2020-06-22
Payer: COMMERCIAL

## 2020-06-22 VITALS
HEIGHT: 71 IN | WEIGHT: 180 LBS | SYSTOLIC BLOOD PRESSURE: 105 MMHG | HEART RATE: 61 BPM | OXYGEN SATURATION: 99 % | DIASTOLIC BLOOD PRESSURE: 68 MMHG | BODY MASS INDEX: 25.2 KG/M2

## 2020-06-22 PROCEDURE — 99214 OFFICE O/P EST MOD 30 MIN: CPT

## 2020-06-22 PROCEDURE — 93000 ELECTROCARDIOGRAM COMPLETE: CPT

## 2020-06-23 LAB
ALBUMIN SERPL ELPH-MCNC: 4.8 G/DL
ALP BLD-CCNC: 58 U/L
ALT SERPL-CCNC: 20 U/L
ANION GAP SERPL CALC-SCNC: 12 MMOL/L
AST SERPL-CCNC: 28 U/L
BILIRUB SERPL-MCNC: 0.8 MG/DL
BUN SERPL-MCNC: 15 MG/DL
CALCIUM SERPL-MCNC: 9.6 MG/DL
CHLORIDE SERPL-SCNC: 103 MMOL/L
CHOLEST SERPL-MCNC: 95 MG/DL
CHOLEST/HDLC SERPL: 2.1 RATIO
CO2 SERPL-SCNC: 24 MMOL/L
CREAT SERPL-MCNC: 0.88 MG/DL
ESTIMATED AVERAGE GLUCOSE: 134 MG/DL
GLUCOSE SERPL-MCNC: 103 MG/DL
HBA1C MFR BLD HPLC: 6.3 %
HDLC SERPL-MCNC: 46 MG/DL
LDLC SERPL CALC-MCNC: 36 MG/DL
NT-PROBNP SERPL-MCNC: 426 PG/ML
POTASSIUM SERPL-SCNC: 4.6 MMOL/L
PROT SERPL-MCNC: 6.7 G/DL
SODIUM SERPL-SCNC: 139 MMOL/L
TRIGL SERPL-MCNC: 65 MG/DL

## 2020-06-23 NOTE — ASSESSMENT
[FreeTextEntry1] : Mr. Jefferson is a 61 yo M with an ischemic cardiomyopathy, HFrEF (EF 15-20%, LVEDD 6 cm; improved to 25%) s/p [place] Sci ICD 4/19, CAD s/p Impella-Assisted PCI on 11/20/18 w/ 1 stent to pLAD w/ residual  of RCA/LCx, mod-severe MR, and T2DM who has been doing well since his last visit. He is ACC/AHA Stage C HF with NYHA class I-II symptoms. He currently appears well compensated, euvolemic and low normotensive.\par \par 1. Ischemic cardiomyopathy HFrEF-\par - Continue Toprol 75mg bid\par - Continue Entresto 49/51 mg BID; unable to uptitrate due to BP in 80-90s (asymptomatic)\par - on spironolactone 25 mg QOD; given relatively hypovolemic, reduce to Monday and Friday\par - Stable on no diuretics. Will continue to closely monitor weights and BP.\par - will schedule CPET to better assess his function and prognosis as well as to determine whether MR needs intervention during last visit but unable to obtain due to Coronavirus pandemic. We'll schedule hopefully in the next 1-2 months.\par \par 2. CAD S/P PCI of LAD- without s/s of ischemia\par - Continue ASA and Toprol\par - Continue lipitor 80 mg daily; lipid panel at goal\par \par RTC 4 months with me.

## 2020-06-23 NOTE — HISTORY OF PRESENT ILLNESS
[FreeTextEntry1] : Mr. Jefferson is a 61 y/o M with ICM/HFrEF (EF initially 15-20%, LVEDD 6 cm; improved to 25%) s/p [place] Sci ICD 4/19, CAD s/p Impella-Assisted PCI on 11/20/18 w/ 1 stent to pLAD w/ residual  of RCA/LCx, mod-severe MR, and T2DM (A1c 5.8 11/19) who presents for follow-up. \par \par He had a cardiac viability study on 3/26/20 which showed large moderate-severe defects in basal anterolateral, inferior, inferolateral and inferoapical walls that were fixed c/w infarct but had some viability in mid-anterolateral and distal lateral walls as well as preserved perfusion on rest of anterior, septal and apical walls. Based on this, did not appear revascularization of RCA/LCx would be indicated. \par \par Since last visit he has been feeling well.  He denies any sob, orthopnea or PND. He denies any chest pain, palpitations, dizziness or ICD shocks.  He does not note any limitation in his functional capacity, walks on a treadmill 4-5 times a week (up to 60 to 70min up to 3.3 mph). He has been taking his medications as directed. Able to go up 2 flights of stairs w/o difficulty. He has no abdominal discomfort, distention or LE edema. Denies lightheadedness, dizziness, syncope, or ICD discharge. Denies hospitalization or ER visits since the last visit. SBP at home ranging high 80s to 100s, HR 60's - 70's. He denies lightheadedness or dizziness with SBP 80's.

## 2020-06-23 NOTE — PHYSICAL EXAM
[General Appearance - Well Developed] : well developed [Well Groomed] : well groomed [Normal Appearance] : normal appearance [General Appearance - In No Acute Distress] : no acute distress [General Appearance - Well Nourished] : well nourished [Eyelids - No Xanthelasma] : the eyelids demonstrated no xanthelasmas [Normal Conjunctiva] : the conjunctiva exhibited no abnormalities [No Oral Cyanosis] : no oral cyanosis [No Oral Pallor] : no oral pallor [] : no respiratory distress [Auscultation Breath Sounds / Voice Sounds] : lungs were clear to auscultation bilaterally [Respiration, Rhythm And Depth] : normal respiratory rhythm and effort [Heart Sounds] : normal S1 and S2 [Heart Rate And Rhythm] : heart rate and rhythm were normal [Murmurs] : no murmurs present [Arterial Pulses Normal] : the arterial pulses were normal [Edema] : no peripheral edema present [2+] : right 2+ [Bowel Sounds] : normal bowel sounds [Abdomen Soft] : soft [Abdomen Tenderness] : non-tender [Abnormal Walk] : normal gait [Nail Clubbing] : no clubbing of the fingernails [Cyanosis, Localized] : no localized cyanosis [Skin Turgor] : normal skin turgor [Oriented To Time, Place, And Person] : oriented to person, place, and time [Mood] : the mood was normal [Impaired Insight] : insight and judgment were intact [Affect] : the affect was normal [FreeTextEntry1] : JVP < 6 cm H2O, no HJR

## 2020-07-31 NOTE — PATIENT PROFILE ADULT - NSTRANSFERBELONGINGSDISPO_GEN_A_NUR
Airway    Date/Time: 7/31/2020 4:15 PM  Performed by: Julia Schroeder M.D.  Authorized by: Julia Schroeder M.D.     Location:  OR  Urgency:  Elective  Difficult Airway: No    Indications for Airway Management:  Anesthesia      Spontaneous Ventilation: absent    Sedation Level:  Deep  Preoxygenated: Yes    Patient Position:  Sniffing  MILS Maintained Throughout: No    Mask Difficulty Assessment:  1 - vent by mask  Final Airway Type:  Supraglottic airway  Final Supraglottic Airway:  Standard LMA    SGA Size:  4  Number of Attempts at Approach:  1  Number of Other Approaches Attempted:  0           not applicable

## 2020-08-03 ENCOUNTER — APPOINTMENT (OUTPATIENT)
Dept: ELECTROPHYSIOLOGY | Facility: CLINIC | Age: 60
End: 2020-08-03

## 2020-08-08 ENCOUNTER — APPOINTMENT (OUTPATIENT)
Dept: DISASTER EMERGENCY | Facility: CLINIC | Age: 60
End: 2020-08-08

## 2020-08-09 LAB — SARS-COV-2 N GENE NPH QL NAA+PROBE: NOT DETECTED

## 2020-08-11 ENCOUNTER — APPOINTMENT (OUTPATIENT)
Dept: ELECTROPHYSIOLOGY | Facility: CLINIC | Age: 60
End: 2020-08-11
Payer: COMMERCIAL

## 2020-08-11 ENCOUNTER — APPOINTMENT (OUTPATIENT)
Dept: HEART FAILURE | Facility: CLINIC | Age: 60
End: 2020-08-11
Payer: COMMERCIAL

## 2020-08-11 PROCEDURE — 93296 REM INTERROG EVL PM/IDS: CPT

## 2020-08-11 PROCEDURE — 93000 ELECTROCARDIOGRAM COMPLETE: CPT | Mod: 59

## 2020-08-11 PROCEDURE — 94621 CARDIOPULM EXERCISE TESTING: CPT

## 2020-08-11 PROCEDURE — 93018 CV STRESS TEST I&R ONLY: CPT | Mod: 59

## 2020-08-11 PROCEDURE — 93295 DEV INTERROG REMOTE 1/2/MLT: CPT

## 2020-08-11 PROCEDURE — 94375 RESPIRATORY FLOW VOLUME LOOP: CPT

## 2020-08-11 PROCEDURE — 94200 LUNG FUNCTION TEST (MBC/MVV): CPT | Mod: 59

## 2020-09-28 ENCOUNTER — APPOINTMENT (OUTPATIENT)
Dept: CARDIOLOGY | Facility: CLINIC | Age: 60
End: 2020-09-28

## 2020-09-28 NOTE — REASON FOR VISIT
[Home] : at home, [unfilled] , at the time of the visit. [Medical Office: ([facility])___] : at the medical office located in  [Verbal consent obtained from patient] : the patient, [unfilled] [Initial Evaluation] : an initial evaluation of [FreeTextEntry2] : Cardiac Rehab Phase 2 [FreeTextEntry1] :  Sep 28 2020 11:00AM \par \par Reason For Visit: Patient initial intake assessment - cardiac rehabilitation: To assess and evaluate current physical activity/exercise status and other risk factor lifestyle behaviors; and to collaborate on individual heart health goals and action.  \par \par \par \par HOME RAMIREZ is a 60 radha old who is presented today via telephonic appointment for an initial intake assessment for Cardiac Rehabilitation Phase 2 program.  He  appears calm, appropriate, and seems to be in no acute distress.  HOME  is aware of the purpose of this appointment: to connect with cardiac rehabilitation provider; to assess and evaluate current physical activity/exercise; medication; psychosocial support system and emotional health; nutritional status; weight management; diabetes management; and blood pressure management.  Also, to confirm his current health care providers, past cardiac history, recent HPI, past medical and surgical history, and to review related risk factors. \par \par To collaborate with patient and create a safe individual cardiac rehab plan to support heart health goals that are specific measurable, attainable, realistic, and timed.\par \par \par \par \par

## 2020-09-28 NOTE — REVIEW OF SYSTEMS
[see HPI] : see HPI [Shortness Of Breath] : no shortness of breath [Dyspnea on exertion] : not dyspnea during exertion [Chest  Pressure] : no chest pressure [Chest Pain] : no chest pain [Palpitations] : no palpitations [Cough] : no cough [Wheezing] : no wheezing [Joint Pain] : no joint pain [Dizziness] : no dizziness

## 2020-09-28 NOTE — ASSESSMENT
[FreeTextEntry1] : Assessment/Plan\par HOME RAMIREZ is a patient who is a 60 year male  referred to our facility-based cardiac rehabilitation, with Chronic Systolic Heart Failure.  Presently, patient  is not motivated and agreeable to attend our program.  Patient expressed he exercise  independently at home for 60-70 minutes, 4-5 times per week and he his doing great. States he want to discuss further with Dr. Triana. The importance of cardiac rehab explained,  patient verbalized understanding.   States he will call on Monday, October 5th, 2020 with his decision. \par \par HOME identifies the following SMART goals:\par Physical activity/ exercise routine: By the end of the 12 weeks program: to do aerobic exercise at a higher intensity.  \par \par \par Plan:  Patient and NP collaborated to formulate an Individualized Treatment Plan.  Patient to complete Rate-Your-Plate; PHQ-9; and [facility] [place] CO[facility].  Cardiac rehab number made available for additional questions or concerns. Patient left appointment with all questions answered. A start date and time to be determined \par \par \par Time spent in this encounter: 30 minutes\par \par \par \par \par Tiburcio Gore NP\par Nurse Practitioner\par [facility] [place]\par \par \par

## 2020-09-28 NOTE — HISTORY OF PRESENT ILLNESS
[FreeTextEntry1] : HOME RAMIREZ is a  60 year male who presents for cardiac rehab phase 2 initial intake assessment via telephonic visit. \par \par Clinical diagnosis indication for cardiac rehabilitation phase 2: Chronic Systolic heart Failure, EF 25% .\par \par Referring Cardiologist:  Sravan Triana \par PCP:  Macho Andujar \par \par Subjective: HOME states he feels very well. \par Patient reports no changes in health or medications.\par Currently, patient denies signs or symptoms related to angina or acute dyspnea. \par Patient states he  walks independently, denies loss of balance, lightheadedness, orthopedic limitations or falls.\par \par HOME self-reports the following:\par Physical activity/exercise status: \par Aerobic exercise: Reports he walks on the treadmill 4-5 times per week with 6 pounds hand held weights at a speed of 3.5 MPH and an incline of 2. 0 for 60-70 minutes. Reports no adverse reaction with current home exercise regimen. \par Resistance/strength training: none\par \par Medications: Reconciled with patient.  He is adhering to his medications as prescribed. \par \par Heart healthy eating pattern: HOME reports no challenges to eating healthy.  States he made lifestyle changes in  2018 after cardiac intervention. \par Fruits and vegetables:  Reports he wants to increase to 3-4 times per week\par Lean protein: States he only eats lean meat.\par Whole grains: Reports he wants to increase to 3-4 times per week\par Sodium intake: Patient limits salt and sodium\par Dietary fat: Patient reports limited amount\par Processed foods: States he reads food labels\par Alcohol intake: Reports he drinks one to two beers per year\par \par \par Psychosocial status and emotional well-being:  HOME states He is doing well.  He retired one year ago, he used to work as a [place]  \par He  reports current coping as very good. He  spends his time reading and watching sports.  Reports he uses deep breathing exercise and listens to music to manage stress.  States he  lives with his  wife who is very supportive. Patient denies suicidal ideation, reports no depression \par \par \par Use of nicotine or tobacco products: Patient denies current use of tobacco/cigarette.\par \par Other self-monitoring information reported by patient includes:\par Blood pressure: Monitor daily\par Blood glucose: Monitor twice daily\par Daily weights: Monitor daily, reports no significant weight gain or weight loss\par \par \par \par \par

## 2020-10-05 ENCOUNTER — NON-APPOINTMENT (OUTPATIENT)
Age: 60
End: 2020-10-05

## 2020-10-05 ENCOUNTER — APPOINTMENT (OUTPATIENT)
Dept: HEART FAILURE | Facility: CLINIC | Age: 60
End: 2020-10-05
Payer: COMMERCIAL

## 2020-10-05 VITALS
SYSTOLIC BLOOD PRESSURE: 104 MMHG | WEIGHT: 186 LBS | BODY MASS INDEX: 26.04 KG/M2 | DIASTOLIC BLOOD PRESSURE: 65 MMHG | OXYGEN SATURATION: 100 % | HEART RATE: 60 BPM | RESPIRATION RATE: 12 BRPM | TEMPERATURE: 97.8 F | HEIGHT: 71 IN

## 2020-10-05 PROCEDURE — 93000 ELECTROCARDIOGRAM COMPLETE: CPT

## 2020-10-05 PROCEDURE — 99214 OFFICE O/P EST MOD 30 MIN: CPT

## 2020-10-05 NOTE — HISTORY OF PRESENT ILLNESS
[FreeTextEntry1] : Mr. Jefferson is a 61 y/o M with ICM/HFrEF (EF initially 15-20%, LVEDD 6 cm; improved to 25%) s/p [place] Sci ICD 4/19, CAD s/p Impella-Assisted PCI on 11/20/18 w/ 1 stent to pLAD w/ residual  of RCA/LCx, mod-severe MR, and T2DM (A1c 5.8 11/19) who presents for follow-up. Accompanied by wife. \par \par He had a cardiac viability study on 3/26/20 which showed large moderate-severe defects in basal anterolateral, inferior, inferolateral and inferoapical walls that were fixed c/w infarct but had some viability in mid-anterolateral and distal lateral walls as well as preserved perfusion on rest of anterior, septal and apical walls. Based on this, did not appear revascularization of RCA/LCx would be indicated. \par \par To further evaluate his exertional capacity, he underwent a CPET (full results below) on 8/11 with peak VO2 of 14.4 (43% predicted) and VO2 at AT of 4.6 (<40% predicted) with VE/VCO2 of 35 consistent with impaired cardiopulmonary status with possible element of deconditioning so he was referred to cardiac rehab. \par \par Since last visit he has been feeling well. He denies any sob, orthopnea or PND. He denies any chest pain, palpitations, dizziness or ICD shocks. He does not note any limitation in his functional capacity, walks on a treadmill 4-5 times a week (up to 60 to 70min up to 3.3 mph). He has been taking his medications as directed. Able to go up 2 flights of stairs w/o difficulty. He has no abdominal discomfort, distention or LE edema. Denies lightheadedness, dizziness, syncope, or ICD discharge. Denies hospitalization or ER visits since the last visit. SBP at home ranging high 80s to 100s, HR 60's - 70's. He denies lightheadedness or dizziness with SBP 80's. He is unsure if he wants to participate in cardiac rehab.

## 2020-10-05 NOTE — ASSESSMENT
[FreeTextEntry1] : Mr. Jefferson is a 59 yo M with an ischemic cardiomyopathy, HFrEF (EF 15-20%, LVEDD 6 cm; improved to 25%) s/p [place] Sci ICD 4/19, CAD s/p Impella-Assisted PCI on 11/20/18 w/ 1 stent to pLAD w/ residual  of RCA/LCx, mod-severe MR, and T2DM who has been doing well since his last visit. He is ACC/AHA Stage C HF with NYHA class I-II symptoms. He currently appears well compensated, hypovolemic and low normotensive. Advised cardiac rehab based on recent CPET but is reluctant to pursue. \par \par 1. Ischemic cardiomyopathy HFrEF-\par - Continue Toprol 75mg bid\par - Continue Entresto 49/51 mg BID; unable to uptitrate due to BP in 80-90s (asymptomatic)\par - on spironolactone 25 mg QOD; given relatively hypovolemic, reduce to 12.5 mg every other day\par - Stable on no diuretics. Will continue to closely monitor weights and BP.\par - repeat TTE\par \par 2. CAD S/P PCI of LAD- without s/s of ischemia\par - Continue ASA and Toprol\par - Continue lipitor 80 mg daily; lipid panel at goal \par \par RTC 6 months with me

## 2020-10-20 ENCOUNTER — APPOINTMENT (OUTPATIENT)
Dept: CV DIAGNOSITCS | Facility: HOSPITAL | Age: 60
End: 2020-10-20

## 2020-10-20 ENCOUNTER — OUTPATIENT (OUTPATIENT)
Dept: OUTPATIENT SERVICES | Facility: HOSPITAL | Age: 60
LOS: 1 days | End: 2020-10-20
Payer: COMMERCIAL

## 2020-10-20 DIAGNOSIS — I50.22 CHRONIC SYSTOLIC (CONGESTIVE) HEART FAILURE: ICD-10-CM

## 2020-10-20 PROCEDURE — 93306 TTE W/DOPPLER COMPLETE: CPT | Mod: 26

## 2020-10-20 PROCEDURE — C8929: CPT

## 2020-11-02 ENCOUNTER — APPOINTMENT (OUTPATIENT)
Dept: ELECTROPHYSIOLOGY | Facility: CLINIC | Age: 60
End: 2020-11-02
Payer: COMMERCIAL

## 2020-11-02 VITALS — HEART RATE: 60 BPM | OXYGEN SATURATION: 99 % | DIASTOLIC BLOOD PRESSURE: 67 MMHG | SYSTOLIC BLOOD PRESSURE: 102 MMHG

## 2020-11-02 PROCEDURE — 93283 PRGRMG EVAL IMPLANTABLE DFB: CPT

## 2020-11-02 PROCEDURE — 99072 ADDL SUPL MATRL&STAF TM PHE: CPT

## 2020-11-10 ENCOUNTER — RX RENEWAL (OUTPATIENT)
Age: 60
End: 2020-11-10

## 2021-01-26 ENCOUNTER — APPOINTMENT (OUTPATIENT)
Dept: HEART FAILURE | Facility: CLINIC | Age: 61
End: 2021-01-26
Payer: COMMERCIAL

## 2021-01-26 VITALS
HEIGHT: 71 IN | WEIGHT: 188 LBS | SYSTOLIC BLOOD PRESSURE: 95 MMHG | BODY MASS INDEX: 26.32 KG/M2 | RESPIRATION RATE: 16 BRPM | DIASTOLIC BLOOD PRESSURE: 59 MMHG | HEART RATE: 60 BPM | TEMPERATURE: 97.7 F | OXYGEN SATURATION: 100 %

## 2021-01-26 PROCEDURE — 99072 ADDL SUPL MATRL&STAF TM PHE: CPT

## 2021-01-26 PROCEDURE — 99214 OFFICE O/P EST MOD 30 MIN: CPT

## 2021-01-27 LAB
ALBUMIN SERPL ELPH-MCNC: 4.9 G/DL
ALP BLD-CCNC: 60 U/L
ALT SERPL-CCNC: 26 U/L
ANION GAP SERPL CALC-SCNC: 14 MMOL/L
AST SERPL-CCNC: 33 U/L
BILIRUB SERPL-MCNC: 1 MG/DL
BUN SERPL-MCNC: 16 MG/DL
CALCIUM SERPL-MCNC: 9.5 MG/DL
CHLORIDE SERPL-SCNC: 104 MMOL/L
CO2 SERPL-SCNC: 21 MMOL/L
CREAT SERPL-MCNC: 0.96 MG/DL
GLUCOSE SERPL-MCNC: 105 MG/DL
MAGNESIUM SERPL-MCNC: 2.1 MG/DL
NT-PROBNP SERPL-MCNC: 380 PG/ML
POTASSIUM SERPL-SCNC: 4.6 MMOL/L
PROT SERPL-MCNC: 6.9 G/DL
SODIUM SERPL-SCNC: 139 MMOL/L

## 2021-01-27 NOTE — HISTORY OF PRESENT ILLNESS
[FreeTextEntry1] : Mr. Jefferson is a 59 y/o M with ICM/HFrEF (EF initially 15-20%, LVEDD 6.5 cm; improved to 25%) s/p [place] Sci ICD 4/19, CAD s/p Impella-Assisted PCI on 11/20/18 w/ 1 stent to pLAD w/ residual  of RCA/LCx, mod-severe MR, and T2DM (A1c 6.3% 6/22/20). \par \par He had a cardiac viability study on 3/26/20 which showed large moderate-severe defects in basal anterolateral, inferior, inferolateral and inferoapical walls that were fixed c/w infarct but had some viability in mid-anterolateral and distal lateral walls as well as preserved perfusion on rest of anterior, septal and apical walls. Based on this, did not appear revascularization of RCA/LCx would be indicated. \par \par To further evaluate his exertional capacity, he underwent a CPET (full results below) on 8/11/20 with peak VO2 of 14.4 (43% predicted) and VO2 at AT of 4.6 (<40% predicted) with VE/VCO2 of 35 consistent with impaired cardiopulmonary status with possible element of deconditioning so he was referred to cardiac rehab. \par \par He presents today for follow-up of his cardiomyopathy. Since his last visit on 10/5, has noted new onset ARAIZA and fatigue for the past 2 months. Previously, was without limitations to his functional capacity and was able to exercise on a treadmill 5-6 days a week (up to 60-70 min at 3.3 mph). Today, he reports having to slow down his pace M[facility] when walking from entrance 3 to our office (approx 810 feet), which is new for him. Overnight, noted mildly labored breathing upon awakening but denies this being the reason he awoke. Reported BPs appears stable, generally 90/60s with occasional SBP readings in high 80s without LH/dizziness. Home weights have been stable, today 186 lbs. His appetite remains good. He is taking his medications as prescribed. He denies SOB at rest, CP, palpitations, orthopnea, cough, ABD distention and LE swelling. His ICD has not fired. He has not been admitted to the hospital or seen in the ED for HF in the interim. \par

## 2021-01-27 NOTE — PHYSICAL EXAM
[General Appearance - Well Developed] : well developed [Normal Appearance] : normal appearance [Well Groomed] : well groomed [General Appearance - Well Nourished] : well nourished [General Appearance - In No Acute Distress] : no acute distress [Normal Conjunctiva] : the conjunctiva exhibited no abnormalities [Eyelids - No Xanthelasma] : the eyelids demonstrated no xanthelasmas [No Oral Pallor] : no oral pallor [No Oral Cyanosis] : no oral cyanosis [] : no respiratory distress [Respiration, Rhythm And Depth] : normal respiratory rhythm and effort [Auscultation Breath Sounds / Voice Sounds] : lungs were clear to auscultation bilaterally [Heart Sounds] : normal S1 and S2 [Heart Rate And Rhythm] : heart rate and rhythm were normal [Arterial Pulses Normal] : the arterial pulses were normal [Edema] : no peripheral edema present [2+] : left 2+ [Bowel Sounds] : normal bowel sounds [Abdomen Soft] : soft [Abdomen Tenderness] : non-tender [Abnormal Walk] : normal gait [Nail Clubbing] : no clubbing of the fingernails [Cyanosis, Localized] : no localized cyanosis [Skin Color & Pigmentation] : normal skin color and pigmentation [FreeTextEntry1] : warm peripherally  [Skin Turgor] : normal skin turgor [Oriented To Time, Place, And Person] : oriented to person, place, and time [Impaired Insight] : insight and judgment were intact [Affect] : the affect was normal [Mood] : the mood was normal

## 2021-01-27 NOTE — ASSESSMENT
[FreeTextEntry1] : Mr. Jefferson is a 59 yo M with an ischemic cardiomyopathy, HFrEF (EF 15-20% improved to 25% with LVEDD 6.9 cm) s/p [place] Sci ICD 4/19, CAD s/p Impella-Assisted PCI on 11/20/18 w/ 1 stent to pLAD w/ residual  of RCA/LCx, mod-severe MR, and T2DM.\par \par He is ACC/AHA Stage C HF, now reporting development of NYHA Class III symptoms likely related to his degree of MR. Otherwise, he appears euvolemic and compensated from HR perspective. He is low normotensive on moderate dose GDMT. \par \par \par \par 1. Ischemic cardiomyopathy HFrEF-\par - Continue Toprol XL 75 mg BID; HR at goal\par - Continue Entresto 49/51 mg BID; further uptitration limited by marginal BPs \par - Continue spironolactone 12.5 mg QOD\par - Maintaining euvolemia without need of loop diuretics\par - Previously advised cardiac rehab based on recent CPET data, but has been reluctant to pursue\par - Labs today with normal renal and hepatic serologies with electrolytes within normal range. Pro-BNP has not increased, now 380 compared to 426 in June. \par \par 2. Mitral Regurgitation\par - Will refer to structural cardiology, Dr. Castañeda for further evaluation \par \par 3. CAD S/P PCI of LAD\par - Without signs of ischemia\par - Continue ASA, BB and ARB (in Entresto)\par - Continue Lipitor 80 mg daily; lipid panel at goal \par \par \par RTC 3 months with Dr. Triana, sooner if symptoms dictate

## 2021-01-28 ENCOUNTER — NON-APPOINTMENT (OUTPATIENT)
Age: 61
End: 2021-01-28

## 2021-01-28 ENCOUNTER — APPOINTMENT (OUTPATIENT)
Dept: CARDIOLOGY | Facility: CLINIC | Age: 61
End: 2021-01-28
Payer: COMMERCIAL

## 2021-01-28 VITALS
OXYGEN SATURATION: 99 % | WEIGHT: 185 LBS | SYSTOLIC BLOOD PRESSURE: 125 MMHG | DIASTOLIC BLOOD PRESSURE: 80 MMHG | BODY MASS INDEX: 25.9 KG/M2 | HEART RATE: 63 BPM | RESPIRATION RATE: 14 BRPM | TEMPERATURE: 98 F | HEIGHT: 71 IN

## 2021-01-28 DIAGNOSIS — E78.5 HYPERLIPIDEMIA, UNSPECIFIED: ICD-10-CM

## 2021-01-28 DIAGNOSIS — R06.02 SHORTNESS OF BREATH: ICD-10-CM

## 2021-01-28 PROCEDURE — 99215 OFFICE O/P EST HI 40 MIN: CPT

## 2021-01-28 PROCEDURE — 93000 ELECTROCARDIOGRAM COMPLETE: CPT

## 2021-01-28 PROCEDURE — 99072 ADDL SUPL MATRL&STAF TM PHE: CPT

## 2021-02-01 ENCOUNTER — APPOINTMENT (OUTPATIENT)
Dept: ELECTROPHYSIOLOGY | Facility: CLINIC | Age: 61
End: 2021-02-01
Payer: COMMERCIAL

## 2021-02-01 PROCEDURE — 93295 DEV INTERROG REMOTE 1/2/MLT: CPT

## 2021-02-01 PROCEDURE — 93296 REM INTERROG EVL PM/IDS: CPT

## 2021-02-13 ENCOUNTER — APPOINTMENT (OUTPATIENT)
Dept: DISASTER EMERGENCY | Facility: CLINIC | Age: 61
End: 2021-02-13

## 2021-02-14 LAB — SARS-COV-2 N GENE NPH QL NAA+PROBE: NOT DETECTED

## 2021-02-16 ENCOUNTER — OUTPATIENT (OUTPATIENT)
Dept: OUTPATIENT SERVICES | Facility: HOSPITAL | Age: 61
LOS: 1 days | End: 2021-02-16
Payer: COMMERCIAL

## 2021-02-16 VITALS
SYSTOLIC BLOOD PRESSURE: 108 MMHG | RESPIRATION RATE: 18 BRPM | DIASTOLIC BLOOD PRESSURE: 70 MMHG | HEART RATE: 60 BPM | OXYGEN SATURATION: 97 %

## 2021-02-16 VITALS
DIASTOLIC BLOOD PRESSURE: 69 MMHG | RESPIRATION RATE: 16 BRPM | OXYGEN SATURATION: 100 % | HEIGHT: 71 IN | SYSTOLIC BLOOD PRESSURE: 112 MMHG | HEART RATE: 60 BPM | TEMPERATURE: 98 F | WEIGHT: 181 LBS

## 2021-02-16 DIAGNOSIS — I25.5 ISCHEMIC CARDIOMYOPATHY: ICD-10-CM

## 2021-02-16 DIAGNOSIS — Z95.810 PRESENCE OF AUTOMATIC (IMPLANTABLE) CARDIAC DEFIBRILLATOR: Chronic | ICD-10-CM

## 2021-02-16 LAB
ANION GAP SERPL CALC-SCNC: 9 MMOL/L — SIGNIFICANT CHANGE UP (ref 5–17)
BUN SERPL-MCNC: 20 MG/DL — SIGNIFICANT CHANGE UP (ref 7–23)
CALCIUM SERPL-MCNC: 9.4 MG/DL — SIGNIFICANT CHANGE UP (ref 8.4–10.5)
CHLORIDE SERPL-SCNC: 102 MMOL/L — SIGNIFICANT CHANGE UP (ref 96–108)
CO2 SERPL-SCNC: 28 MMOL/L — SIGNIFICANT CHANGE UP (ref 22–31)
CREAT SERPL-MCNC: 0.91 MG/DL — SIGNIFICANT CHANGE UP (ref 0.5–1.3)
GLUCOSE BLDC GLUCOMTR-MCNC: 115 MG/DL — HIGH (ref 70–99)
GLUCOSE SERPL-MCNC: 117 MG/DL — HIGH (ref 70–99)
HCT VFR BLD CALC: 45.9 % — SIGNIFICANT CHANGE UP (ref 39–50)
HGB BLD-MCNC: 14.9 G/DL — SIGNIFICANT CHANGE UP (ref 13–17)
MCHC RBC-ENTMCNC: 30.3 PG — SIGNIFICANT CHANGE UP (ref 27–34)
MCHC RBC-ENTMCNC: 32.5 GM/DL — SIGNIFICANT CHANGE UP (ref 32–36)
MCV RBC AUTO: 93.5 FL — SIGNIFICANT CHANGE UP (ref 80–100)
NRBC # BLD: 0 /100 WBCS — SIGNIFICANT CHANGE UP (ref 0–0)
PLATELET # BLD AUTO: 148 K/UL — LOW (ref 150–400)
POTASSIUM SERPL-MCNC: 4.2 MMOL/L — SIGNIFICANT CHANGE UP (ref 3.5–5.3)
POTASSIUM SERPL-SCNC: 4.2 MMOL/L — SIGNIFICANT CHANGE UP (ref 3.5–5.3)
RBC # BLD: 4.91 M/UL — SIGNIFICANT CHANGE UP (ref 4.2–5.8)
RBC # FLD: 13.1 % — SIGNIFICANT CHANGE UP (ref 10.3–14.5)
SODIUM SERPL-SCNC: 139 MMOL/L — SIGNIFICANT CHANGE UP (ref 135–145)
WBC # BLD: 5.56 K/UL — SIGNIFICANT CHANGE UP (ref 3.8–10.5)
WBC # FLD AUTO: 5.56 K/UL — SIGNIFICANT CHANGE UP (ref 3.8–10.5)

## 2021-02-16 PROCEDURE — C1894: CPT

## 2021-02-16 PROCEDURE — 93010 ELECTROCARDIOGRAM REPORT: CPT

## 2021-02-16 PROCEDURE — 93460 R&L HRT ART/VENTRICLE ANGIO: CPT | Mod: 26

## 2021-02-16 PROCEDURE — C1887: CPT

## 2021-02-16 PROCEDURE — 93005 ELECTROCARDIOGRAM TRACING: CPT

## 2021-02-16 PROCEDURE — 80048 BASIC METABOLIC PNL TOTAL CA: CPT

## 2021-02-16 PROCEDURE — 99152 MOD SED SAME PHYS/QHP 5/>YRS: CPT

## 2021-02-16 PROCEDURE — 82962 GLUCOSE BLOOD TEST: CPT

## 2021-02-16 PROCEDURE — C1769: CPT

## 2021-02-16 PROCEDURE — 85027 COMPLETE CBC AUTOMATED: CPT

## 2021-02-16 PROCEDURE — 93460 R&L HRT ART/VENTRICLE ANGIO: CPT

## 2021-02-16 RX ORDER — SPIRONOLACTONE 25 MG/1
1 TABLET, FILM COATED ORAL
Qty: 0 | Refills: 0 | DISCHARGE

## 2021-02-16 RX ORDER — SODIUM CHLORIDE 9 MG/ML
10 INJECTION INTRAMUSCULAR; INTRAVENOUS; SUBCUTANEOUS
Refills: 0 | Status: DISCONTINUED | OUTPATIENT
Start: 2021-02-16 | End: 2021-03-02

## 2021-02-16 NOTE — ASU DISCHARGE PLAN (ADULT/PEDIATRIC) - ASU DC SPECIAL INSTRUCTIONSFT
Wound Care:   the day AFTER your procedure remove bandage GENTLY, and clean using  mild soap and gentle warm, water stream, pat dry. leave OPEN to air. YOU MAY SHOWER   DO NOT apply lotions, creams, ointments, powder, perfumes to your incision site  DO NOT SOAK your site for 1 week ( no baths, no pools, no tubs, etc...)  Check  your groin and /or wrist daily. A small amount of bruising, and soreness are normal    ACTIVITY: for 24 hours   - DO NOT DRIVE  - DO NOT make any important decisions or sign legal documents   - DO NOT operate heavy machineries   - you may resume sexual activity in 48 hours, unless otherwise instructed by your cardiologist     If your procedure was done through the WRIST: for the NEXT 3DAYS:  - avoid pushing, pulling, with that affected wrist   - avoid repeated movement of that hand and wrist ( e.g: typing, hammering)  - DO NOT LIFT anything more than 5 lbs     If your procedure was done through the GROIN: for the NEXT 5 DAYS  - Limit climbing stairs, DO NOT soak in bathtub or pool  - no strenuous activities, pushing, pulling, straining  - Do not lift anything 10lbs or heavier     MEDICATION:   take your medications as explained ( see discharge paperwork)   If you received a STENT, you will be taking antiplatelet medications to KEEP YOUR STENT OPEN ( e.g: Aspirin, Plavix, Brilinta, Effient, etc).  Take as prescribed DO NOT STOP taking them without consulting with your cardiologist first.     Follow heart healthy diet recommended by your doctor, , if you smoke STOP SMOKING ( may call [phone] for center of tobacco control if you need assistance)     CALL your doctor to make appointment in 2 WEEKS     ***CALL YOUR DOCTOR***  if you experience: fever, chills, body aches, or severe pain, swelling, redness, heat or yellow discharge at incision site  If you experience Bleeding or excruciating pain at the procedural site, swelling ( golf ball size) at your procedural site  If you experience CHEST PAIN  If you experience extremity numbness, tingling, temperature change ( of your procedural site)   If you are unable to reach your doctor, you may contact:   -Cardiology Office at [facility] at [phone] or   - [facility] [phone]  - [facility] [phone]

## 2021-02-16 NOTE — H&P CARDIOLOGY - SOURCE
Patient Patient is a 46 y/o male with no significant PMHx who presents to [facility] with left shoulder pain after moving a table 2 days ago. Pain gets worse with movement. DENIES fall injury or direct trauma. ~YUKO Hinkle Sling LUE. Patient re-examined and re-evaluated. Patient feels much better at this time. ED evaluation, Diagnosis and management discussed with the patient and family in detail. Workup results discussed with ED attending, OK to dc home.  Close PMD follow up encouraged.  Strict ED return instructions discussed in detail and patient given the opportunity to ask any questions about their discharge diagnosis and instructions. Patient verbalized understanding. ~ YUKO Hinkle

## 2021-02-16 NOTE — ASU PATIENT PROFILE, ADULT - PSH
History of implantable cardioverter-defibrillator (ICD) insertion  [employer] implanted 4/2019

## 2021-02-16 NOTE — H&P CARDIOLOGY - CARDIOVASCULAR DETAILS
Abiodun Raygoza Patient Age: 31 year old  MESSAGE:   Patient calling as he had vasectomy with Dr. Bello 10/2 and continues to have right testicle pain. Per patient pain is consistently and rates it 3-4/10. He states that area feels swollen and affects his ability to walk. He would like to know if there is anything that he can do or take. Please advise.      WEIGHT AND HEIGHT:   Wt Readings from Last 1 Encounters:   08/06/20 98.4 kg (217 lb)     Ht Readings from Last 1 Encounters:   08/06/20 6' (1.829 m)     BMI Readings from Last 1 Encounters:   08/06/20 29.43 kg/m²       ALLERGIES:  Pollen  Current Outpatient Medications   Medication   • HYDROcodone-acetaminophen (Norco) 5-325 MG per tablet   • montelukast (SINGULAIR) 10 MG tablet   • fluticasone (FLONASE) 50 MCG/ACT nasal spray     No current facility-administered medications for this visit.      PHARMACY to use:           Pharmacy preference(s) on file:   [facility] [address] [place] [address] - [address]  [address]  [facility] [address]  Phone: [phone] Fax: [phone]      CALL BACK INFO: Ok to leave response (including medical information) with family member or on answering machine  ROUTING: Patient's physician/staff        PCP: Jason Rosenberg MD         INS: Payor: [employer] / Plan: [employer] / Product Type: [place] Risk   PATIENT ADDRESS:  [address] [place] [address]   [facility] [id number]   positive S1/positive S2/murmur

## 2021-02-16 NOTE — ASU DISCHARGE PLAN (ADULT/PEDIATRIC) - CARE PROVIDER_API CALL
David Valentine)  Interventional Cardiology  [address] [place] [address]  Phone: [phone]  Fax: [phone]  Follow Up Time:

## 2021-02-16 NOTE — H&P CARDIOLOGY - PMH
Acute myocardial infarction    Cardiomyopathy    Chronic systolic heart failure    Coronary artery disease due to lipid rich plaque    Hyperlipidemia    Ischemic cardiomyopathy with implantable cardioverter-defibrillator (ICD)    Nonrheumatic mitral valve regurgitation    Stented coronary artery    Type 2 diabetes mellitus

## 2021-02-16 NOTE — H&P CARDIOLOGY - PSH
History of implantable cardioverter-defibrillator (ICD) insertion  [employer] implanted 4/2019

## 2021-02-16 NOTE — H&P CARDIOLOGY - HISTORY OF PRESENT ILLNESS
61 y/o  male with PMHx of T2DM, CAD - AMI in 11/2018 s/p Impella assisted PCI of pLAD with residual  of the LCx and RCA, ICM s/p [employer] ICD placement in 4/2019, Chronic systolic HFrEF (27% in 10/2020) NYHA Class II on Entresto, Spirolactone, and Metoprolol and known MR offering c/o increasing fatigue and worsening dyspnea x 4 months.  Patient had TTE in 10/2020 showing mild dilated LA, tethered MV leaflets with normal opening, mod-sever eccentric and laterally directed MR.  He is now under evaluation by structural heart for MV intervention.  Patient presents today for R/L HC.

## 2021-02-18 ENCOUNTER — APPOINTMENT (OUTPATIENT)
Dept: CARDIOTHORACIC SURGERY | Facility: CLINIC | Age: 61
End: 2021-02-18
Payer: COMMERCIAL

## 2021-02-25 ENCOUNTER — APPOINTMENT (OUTPATIENT)
Dept: CARDIOTHORACIC SURGERY | Facility: CLINIC | Age: 61
End: 2021-02-25
Payer: COMMERCIAL

## 2021-02-25 ENCOUNTER — OUTPATIENT (OUTPATIENT)
Dept: OUTPATIENT SERVICES | Facility: HOSPITAL | Age: 61
LOS: 1 days | End: 2021-02-25
Payer: COMMERCIAL

## 2021-02-25 VITALS
SYSTOLIC BLOOD PRESSURE: 106 MMHG | HEIGHT: 71 IN | RESPIRATION RATE: 14 BRPM | TEMPERATURE: 98.5 F | WEIGHT: 181 LBS | HEART RATE: 62 BPM | OXYGEN SATURATION: 98 % | DIASTOLIC BLOOD PRESSURE: 70 MMHG | BODY MASS INDEX: 25.34 KG/M2

## 2021-02-25 DIAGNOSIS — Z98.62 PERIPHERAL VASCULAR ANGIOPLASTY STATUS: ICD-10-CM

## 2021-02-25 DIAGNOSIS — I73.89 OTHER SPECIFIED PERIPHERAL VASCULAR DISEASES: ICD-10-CM

## 2021-02-25 DIAGNOSIS — I35.0 NONRHEUMATIC AORTIC (VALVE) STENOSIS: ICD-10-CM

## 2021-02-25 DIAGNOSIS — Z95.810 PRESENCE OF AUTOMATIC (IMPLANTABLE) CARDIAC DEFIBRILLATOR: Chronic | ICD-10-CM

## 2021-02-25 PROBLEM — E11.9 TYPE 2 DIABETES MELLITUS WITHOUT COMPLICATIONS: Chronic | Status: ACTIVE | Noted: 2021-02-16

## 2021-02-25 PROBLEM — I34.0 NONRHEUMATIC MITRAL (VALVE) INSUFFICIENCY: Chronic | Status: ACTIVE | Noted: 2021-02-16

## 2021-02-25 PROBLEM — I25.10 ATHEROSCLEROTIC HEART DISEASE OF NATIVE CORONARY ARTERY WITHOUT ANGINA PECTORIS: Chronic | Status: ACTIVE | Noted: 2021-02-16

## 2021-02-25 PROBLEM — I21.9 ACUTE MYOCARDIAL INFARCTION, UNSPECIFIED: Chronic | Status: ACTIVE | Noted: 2021-02-16

## 2021-02-25 PROBLEM — I25.5 ISCHEMIC CARDIOMYOPATHY: Chronic | Status: ACTIVE | Noted: 2021-02-16

## 2021-02-25 PROBLEM — Z95.5 PRESENCE OF CORONARY ANGIOPLASTY IMPLANT AND GRAFT: Chronic | Status: ACTIVE | Noted: 2021-02-16

## 2021-02-25 PROBLEM — I50.22 CHRONIC SYSTOLIC (CONGESTIVE) HEART FAILURE: Chronic | Status: ACTIVE | Noted: 2021-02-16

## 2021-02-25 PROCEDURE — 99244 OFF/OP CNSLTJ NEW/EST MOD 40: CPT

## 2021-02-25 PROCEDURE — 93306 TTE W/DOPPLER COMPLETE: CPT

## 2021-02-25 PROCEDURE — 93306 TTE W/DOPPLER COMPLETE: CPT | Mod: 26

## 2021-02-25 PROCEDURE — 99072 ADDL SUPL MATRL&STAF TM PHE: CPT

## 2021-02-25 NOTE — HISTORY OF PRESENT ILLNESS
[FreeTextEntry1] : Mr. Jefferson is a 61 year old male followed and referred by Dr. Triana of heart failure for evaluation of mitral regurgitation. To review PMH includes, ICM/HFrEF (EF initially 27%, LVEDD 6.5 cm) s/p [employer] ICD 4/19, CAD s/p Impella-Assisted PCI on 11/20/18 w/ 1 stent to pLAD w/ residual  of RCA/LCx, mod-severe MR, and T2DM (A1c 6.3% 6/22/20). TTE from 10/20/2020 showed Mild dilated LA, Tethered mitral valve leaflets with normal opening. mod-severe, eccentric and laterally directed MR. EF 27%.  He has been followed by HF for some time and underwent cardiac viability study on 3/26/20 which showed large moderate-severe defects in basal anterolateral, inferior, inferolateral and inferoapical walls that were fixed c/w infarct but had some viability in mid-anterolateral and distal lateral walls as well as preserved perfusion on rest of anterior, septal and apical walls. Based on this, did not appear revascularization of RCA/LCx would be indicated. He then underwent a CPET (full results below) on 8/11/20 with peak VO2 of 14.4 (43% predicted) and VO2 at AT of 4.6 (<40% predicted) with VE/VCO2 of 35 consistent with impaired cardiopulmonary status with possible element of deconditioning and was referred to cardiac rehab. \par \par He was seen by Dr. Castañeda on 1/28/2021 at which time he reported increasing fatigue over the past 2 months as well as worsening SOB. He presents today and reports Dyspnea on exertion with climbing stairs since the end of Dec 2020, fatigue. He can walk unlimited without any shortness of breath but he has shortness of breath after climbing 1.5 flight of stairs. He uses 1 pillows to sleep. He is independent with ADLs. Denies any chest pain, dizziness, syncope, PND. \par

## 2021-02-25 NOTE — DATA REVIEWED
[FreeTextEntry1] : Stress Test: 8/11/2020, CPET 8/11/20 - 10:53 min; 4.1 METS; peak VO2 14.4 (43% predicted); VO2 at AT 4.6 ml/kg/min (<40% predicted); O2/pulse was low with VO2/work slope of 8.1 (reduced). VE/VCO2 35; OUES 0.9 L/min (reduced) c/w Peres class C cardiac failure with possible element of decondiitoning \par \par Echo: 10/20/2020, Mild dilated LA, Tethered mitral valve leaflets with normal opening. mod-severe, eccentric and laterally directed MR LVEF 27%. \par \par Cardiac Cath: 11/15/2018, 11/20/18 - Impella-assisted PCI of pLAD 11/15/18 [facility] pLAD 90%, mCx 100%, pRCA 100% \par

## 2021-02-25 NOTE — ASSESSMENT
[FreeTextEntry1] : 62y/o male with ICM/HFrEF (EF initially 27%, LVEDD 6.5 cm) s/p ICD, PCI with symptomatic functional mitral regurgitation for mitraclip.\par \par pt tentatively scheduled for 3/15

## 2021-02-25 NOTE — REVIEW OF SYSTEMS
[Chest Pain] : no chest pain [Palpitations] : no palpitations [Lower Ext Edema] : no extremity edema [Dizziness] : no dizziness [Fainting] : no fainting [Easy Bleeding] : no tendency for easy bleeding [Easy Bruising] : no tendency for easy bruising

## 2021-02-25 NOTE — CONSULT LETTER
[FreeTextEntry2] : Dr. Venegas [FreeTextEntry3] : Chantell Jarquin MD\par Attending Surgeon\par Cardiovascular & Thoracic Surgery\par  \par [facility]

## 2021-03-04 ENCOUNTER — NON-APPOINTMENT (OUTPATIENT)
Age: 61
End: 2021-03-04

## 2021-03-19 ENCOUNTER — OUTPATIENT (OUTPATIENT)
Dept: OUTPATIENT SERVICES | Facility: HOSPITAL | Age: 61
LOS: 1 days | End: 2021-03-19
Payer: COMMERCIAL

## 2021-03-19 ENCOUNTER — APPOINTMENT (OUTPATIENT)
Dept: ULTRASOUND IMAGING | Facility: HOSPITAL | Age: 61
End: 2021-03-19

## 2021-03-19 ENCOUNTER — APPOINTMENT (OUTPATIENT)
Dept: DISASTER EMERGENCY | Facility: CLINIC | Age: 61
End: 2021-03-19

## 2021-03-19 VITALS
DIASTOLIC BLOOD PRESSURE: 60 MMHG | RESPIRATION RATE: 14 BRPM | TEMPERATURE: 98 F | HEIGHT: 71 IN | SYSTOLIC BLOOD PRESSURE: 96 MMHG | HEART RATE: 60 BPM | WEIGHT: 186.95 LBS | OXYGEN SATURATION: 98 %

## 2021-03-19 DIAGNOSIS — I25.5 ISCHEMIC CARDIOMYOPATHY: ICD-10-CM

## 2021-03-19 DIAGNOSIS — I34.9 NONRHEUMATIC MITRAL VALVE DISORDER, UNSPECIFIED: ICD-10-CM

## 2021-03-19 DIAGNOSIS — Z29.9 ENCOUNTER FOR PROPHYLACTIC MEASURES, UNSPECIFIED: ICD-10-CM

## 2021-03-19 DIAGNOSIS — Z95.810 PRESENCE OF AUTOMATIC (IMPLANTABLE) CARDIAC DEFIBRILLATOR: Chronic | ICD-10-CM

## 2021-03-19 LAB
A1C WITH ESTIMATED AVERAGE GLUCOSE RESULT: 6.2 % — HIGH (ref 4–5.6)
ANION GAP SERPL CALC-SCNC: 11 MMOL/L — SIGNIFICANT CHANGE UP (ref 5–17)
BLD GP AB SCN SERPL QL: NEGATIVE — SIGNIFICANT CHANGE UP
BUN SERPL-MCNC: 18 MG/DL — SIGNIFICANT CHANGE UP (ref 7–23)
CALCIUM SERPL-MCNC: 9.5 MG/DL — SIGNIFICANT CHANGE UP (ref 8.4–10.5)
CHLORIDE SERPL-SCNC: 104 MMOL/L — SIGNIFICANT CHANGE UP (ref 96–108)
CO2 SERPL-SCNC: 23 MMOL/L — SIGNIFICANT CHANGE UP (ref 22–31)
CREAT SERPL-MCNC: 0.72 MG/DL — SIGNIFICANT CHANGE UP (ref 0.5–1.3)
ESTIMATED AVERAGE GLUCOSE: 131 MG/DL — HIGH (ref 68–114)
GLUCOSE SERPL-MCNC: 82 MG/DL — SIGNIFICANT CHANGE UP (ref 70–99)
HCT VFR BLD CALC: 43.1 % — SIGNIFICANT CHANGE UP (ref 39–50)
HGB BLD-MCNC: 14.4 G/DL — SIGNIFICANT CHANGE UP (ref 13–17)
MCHC RBC-ENTMCNC: 30.6 PG — SIGNIFICANT CHANGE UP (ref 27–34)
MCHC RBC-ENTMCNC: 33.4 GM/DL — SIGNIFICANT CHANGE UP (ref 32–36)
MCV RBC AUTO: 91.7 FL — SIGNIFICANT CHANGE UP (ref 80–100)
MRSA PCR RESULT.: SIGNIFICANT CHANGE UP
NRBC # BLD: 0 /100 WBCS — SIGNIFICANT CHANGE UP (ref 0–0)
PLATELET # BLD AUTO: 145 K/UL — LOW (ref 150–400)
POTASSIUM SERPL-MCNC: 4.3 MMOL/L — SIGNIFICANT CHANGE UP (ref 3.5–5.3)
POTASSIUM SERPL-SCNC: 4.3 MMOL/L — SIGNIFICANT CHANGE UP (ref 3.5–5.3)
RBC # BLD: 4.7 M/UL — SIGNIFICANT CHANGE UP (ref 4.2–5.8)
RBC # FLD: 13.1 % — SIGNIFICANT CHANGE UP (ref 10.3–14.5)
RH IG SCN BLD-IMP: NEGATIVE — SIGNIFICANT CHANGE UP
S AUREUS DNA NOSE QL NAA+PROBE: SIGNIFICANT CHANGE UP
SODIUM SERPL-SCNC: 138 MMOL/L — SIGNIFICANT CHANGE UP (ref 135–145)
WBC # BLD: 5.34 K/UL — SIGNIFICANT CHANGE UP (ref 3.8–10.5)
WBC # FLD AUTO: 5.34 K/UL — SIGNIFICANT CHANGE UP (ref 3.8–10.5)

## 2021-03-19 PROCEDURE — 87641 MR-STAPH DNA AMP PROBE: CPT

## 2021-03-19 PROCEDURE — 71046 X-RAY EXAM CHEST 2 VIEWS: CPT

## 2021-03-19 PROCEDURE — 71046 X-RAY EXAM CHEST 2 VIEWS: CPT | Mod: 26

## 2021-03-19 PROCEDURE — 93880 EXTRACRANIAL BILAT STUDY: CPT

## 2021-03-19 PROCEDURE — G0463: CPT

## 2021-03-19 PROCEDURE — 86850 RBC ANTIBODY SCREEN: CPT

## 2021-03-19 PROCEDURE — 86900 BLOOD TYPING SEROLOGIC ABO: CPT

## 2021-03-19 PROCEDURE — 86901 BLOOD TYPING SEROLOGIC RH(D): CPT

## 2021-03-19 PROCEDURE — 85027 COMPLETE CBC AUTOMATED: CPT

## 2021-03-19 PROCEDURE — 80048 BASIC METABOLIC PNL TOTAL CA: CPT

## 2021-03-19 PROCEDURE — 87640 STAPH A DNA AMP PROBE: CPT

## 2021-03-19 PROCEDURE — 93880 EXTRACRANIAL BILAT STUDY: CPT | Mod: 26

## 2021-03-19 PROCEDURE — 83036 HEMOGLOBIN GLYCOSYLATED A1C: CPT

## 2021-03-19 RX ORDER — METOPROLOL TARTRATE 50 MG
1.5 TABLET ORAL
Qty: 0 | Refills: 0 | DISCHARGE

## 2021-03-19 NOTE — H&P PST ADULT - LAST STRESS TEST
8/11/2020 8/11/2020 CPET 10:53 min; 4/1 METS; peak VO2 14/4 (43% predicted); VO2 at AT 4.6ml/kg/min (<40% predicted); O2/pulse was low with VO2/work slope of 8/1 (reduced). VE/VCO2 35; OUES 0.9L/min (reduced) c/w Peres class C cardiac failure with possible element of deconditioning

## 2021-03-19 NOTE — H&P PST ADULT - NSICDXPASTMEDICALHX_GEN_ALL_CORE_FT
PAST MEDICAL HISTORY:  Acute myocardial infarction     Cardiomyopathy     Chronic systolic heart failure     Coronary artery disease due to lipid rich plaque     Hyperlipidemia     Ischemic cardiomyopathy with implantable cardioverter-defibrillator (ICD)     Nonrheumatic mitral valve regurgitation     Stented coronary artery     Type 2 diabetes mellitus

## 2021-03-19 NOTE — H&P PST ADULT - ASSESSMENT
ALEJANDROI VTE 2.0 SCORE [CLOT updated 2019]    AGE RELATED RISK FACTORS                                                       MOBILITY RELATED FACTORS  [ ] Age 41-60 years                                            (1 Point)                    [ ] Bed rest                                                        (1 Point)  [2 ] Age: 61-74 years                                           (2 Points)                  [ ] Plaster cast                                                   (2 Points)  [ ] Age= 75 years                                              (3 Points)                    [ ] Bed bound for more than 72 hours                 (2 Points)    DISEASE RELATED RISK FACTORS                                               GENDER SPECIFIC FACTORS  [ ] Edema in the lower extremities                       (1 Point)              [ ] Pregnancy                                                     (1 Point)  [ ] Varicose veins                                               (1 Point)                     [ ] Post-partum < 6 weeks                                   (1 Point)             [1 ] BMI > 25 Kg/m2                                            (1 Point)                     [ ] Hormonal therapy  or oral contraception          (1 Point)                 [ ] Sepsis (in the previous month)                        (1 Point)               [ ] History of pregnancy complications                 (1 point)  [ ] Pneumonia or serious lung disease                                               [ ] Unexplained or recurrent                     (1 Point)           (in the previous month)                               (1 Point)  [ ] Abnormal pulmonary function test                     (1 Point)                 SURGERY RELATED RISK FACTORS  [ ] Acute myocardial infarction                              (1 Point)               [ ]  Section                                             (1 Point)  [ ] Congestive heart failure (in the previous month)  (1 Point)      [ ] Minor surgery                                                  (1 Point)   [ ] Inflammatory bowel disease                             (1 Point)               [ ] Arthroscopic surgery                                        (2 Points)  [ ] Central venous access                                      (2 Points)                [2 ] General surgery lasting more than 45 minutes (2 points)  [ ] Malignancy- Present or previous                   (2 Points)                [ ] Elective arthroplasty                                         (5 points)    [ ] Stroke (in the previous month)                          (5 Points)                                                                                                                                                           HEMATOLOGY RELATED FACTORS                                                 TRAUMA RELATED RISK FACTORS  [ ] Prior episodes of VTE                                     (3 Points)                [ ] Fracture of the hip, pelvis, or leg                       (5 Points)  [ ] Positive family history for VTE                         (3 Points)             [ ] Acute spinal cord injury (in the previous month)  (5 Points)  [ ] Prothrombin 16879 A                                     (3 Points)               [ ] Paralysis  (less than 1 month)                             (5 Points)  [ ] Factor V Leiden                                             (3 Points)                  [ ] Multiple Trauma within 1 month                        (5 Points)  [ ] Lupus anticoagulants                                     (3 Points)                                                           [ ] Anticardiolipin antibodies                               (3 Points)                                                       [ ] High homocysteine in the blood                      (3 Points)                                             [ ] Other congenital or acquired thrombophilia      (3 Points)                                                [ ] Heparin induced thrombocytopenia                  (3 Points)                                     Total Score [ 5         ]

## 2021-03-19 NOTE — H&P PST ADULT - NSICDXFAMILYHX_GEN_ALL_CORE_FT
FAMILY HISTORY:  Father  Still living? No  Family history of CABG, Age at diagnosis: Age Unknown  Family history of diabetes mellitus in father, Age at diagnosis: Age Unknown  Family history of hypertension, Age at diagnosis: Age Unknown    Mother  Still living? No  Family history of Parkinson disease, Age at diagnosis: Age Unknown  Family history of thyroid disease, Age at diagnosis: Age Unknown

## 2021-03-19 NOTE — H&P PST ADULT - NSANTHOSAYNRD_GEN_A_CORE
No. JAQUI screening performed.  STOP BANG Legend: 0-2 = LOW Risk; 3-4 = INTERMEDIATE Risk; 5-8 = HIGH Risk

## 2021-03-19 NOTE — H&P PST ADULT - LAST ECHOCARDIOGRAM
10/20/2020 10/20/2020 Mild dilated LA, Tethered mitral valve leaflets with normal opening, mod-severe, eccentric & laterally directed MR LVEF 27%

## 2021-03-19 NOTE — H&P PST ADULT - NSICDXPASTSURGICALHX_GEN_ALL_CORE_FT
PAST SURGICAL HISTORY:  History of implantable cardioverter-defibrillator (ICD) insertion [employer] implanted 4/2019

## 2021-03-19 NOTE — H&P PST ADULT - HISTORY OF PRESENT ILLNESS
60 YO M PMH CAD s/p Impella assisted PCI of pLAD (x1), Chronic systolic HF (EF 27% in 10/2020) NYHA Class II (on Entresto/Spirolactone/Metoprolol), moderate-severe MR, T2DM, c/o increasing fatigue (worsening dyspnea x4 months, presents to [facility] for MITRACLIP 3/22/2021. Denies any palpitations, chest pain, peripheral edema, N/V, Covid symptoms (fever, chills, cough) or exposure.     ***Covid test today 3/19/21 at [address] 60 YO M PMH CAD s/p Impella assisted PCI of pLAD (x1), Chronic systolic HF (EF 27% in 10/2020) NYHA Class II (on Entresto/Spirolactone/Metoprolol), moderate-severe MR, T2DM, c/o increasing fatigue & worsening dyspnea x4 months, presents to [facility] for MITRACLIP 3/22/2021. Denies any palpitations, chest pain, peripheral edema, N/V, Covid symptoms (fever, chills, cough) or exposure.     ***Covid test today 3/19/21 at [address] 60 YO M PMH CAD s/p Impella assisted PCI of pLAD (x1), Chronic systolic HF (EF 27% in 10/2020) NYHA Class II (on Entresto/Spirolactone/Metoprolol), moderate-severe MR, T2DM, c/o increasing fatigue & worsening ARAIZA x4 months, presents to [facility] for MITRACLIP 3/22/2021. Denies any palpitations, chest pain, peripheral edema, N/V, Covid symptoms (fever, chills, cough) or exposure.     ***Covid test today 3/19/21 at [address]

## 2021-03-19 NOTE — H&P PST ADULT - NSICDXPROBLEM_GEN_ALL_CORE_FT
PROBLEM DIAGNOSES  Problem: Need for prophylactic measure  Assessment and Plan:        PROBLEM DIAGNOSES  Problem: Nonrheumatic mitral valve disorder, unspecified  Assessment and Plan: MITRACLIP  Pre-op education provided - all questions answered   Chlorhex soap & instructions provided  CBC, BMP, T&S, HA1C, MRSA/MSSA swab sent in PST  chest xray & carotid dopplers ordered in PST  Hold Metformin 48 hrs prior to procedure - last dose 3/19 PM  To continue on Asprin, Entresto, metoprolol, spironolactone until day of surgery     Problem: Need for prophylactic measure  Assessment and Plan: The Caprini score indicates this patient is at risk for a VTE event (score 3-5).  Most surgical patients in this group would benefit from pharmacologic prophylaxis.  The surgical team will determine the balance between VTE risk and bleeding risk     Problem: Ischemic cardiomyopathy with implantable cardioverter-defibrillator (ICD)  Assessment and Plan: Interrogation report & card copy in paper chart

## 2021-03-19 NOTE — H&P PST ADULT - NSANTHTIREDRD_ENT_A_CORE
Mom presents to clinic requesting a letter stating when patient's last physical exam was to give to dentist.    Letter printed and given to mother   Yes

## 2021-03-20 LAB — SARS-COV-2 N GENE NPH QL NAA+PROBE: NOT DETECTED

## 2021-03-21 ENCOUNTER — NON-APPOINTMENT (OUTPATIENT)
Age: 61
End: 2021-03-21

## 2021-03-21 PROBLEM — R06.02 SHORTNESS OF BREATH: Status: ACTIVE | Noted: 2021-03-21

## 2021-03-21 PROBLEM — E78.5 DYSLIPIDEMIA: Status: ACTIVE | Noted: 2021-03-21

## 2021-03-21 NOTE — ASSESSMENT
[FreeTextEntry1] : Severe Functional Mitral Regurgitation\par --The patient has been experiencing increasing dyspnea upon exertion and fatigue.  He had a TTE on 2/25/20201 that demonstrated EF 30%, LVIDd 6.7cm, LVIDs 5.8cm with akinesis of the inferolateral, anterolateral, inferior and inferosetpal walls.  Explained to the patient what is severe functional mitral valve regurgitation.  The natural pathophysiology of untreated severe functional mitral regurgitation was explained.  The associated signs and symptoms were discussed.  Different treatment options were gone over including medical therapy, percutaneous clipping of the mitral valve and surgery.  The pros/cons and the risks/benefits of the various approaches was explained.  Due to the patients age and comorbidities he is considered to be a good candidate for the MitraClip procedure.  He is medically optimized.  Indications and details of the procedure were gone over.  Benefits and risks were explained.  Risks include but are not limited to infection, bleeding, arrhythmia, TIA/stroke, vascular injury, worsening renal insuffiency, need for urgent surgery, pericardial effusion/tamponade and death.  As part of his work up recommend repeat catheterization to assess if any progression.  Indications, details and benefits/risks of the procedure was gone over.  \par --Continue aspirin 81mg daily.\par --Continue atorvastatin 80mg daily.\par --Continue Entresto 49/51mg PO BID, metoprolol succinate ER 75mg daily and spironolactone 12.5mg PO QD.\par \par The patient is interested in proceeding\par \par All questions and concerns were addressed.\par \par Findings and plan discussed with heart failure team/Dr. Triana and structural heart team.

## 2021-03-21 NOTE — REASON FOR VISIT
[Initial Evaluation] : an initial evaluation of [Mitral Regurgitation] : mitral regurgitation [FreeTextEntry2] : Evaluation of mitral regurgitation

## 2021-03-21 NOTE — REVIEW OF SYSTEMS
[see HPI] : see HPI [Feeling Fatigued] : feeling fatigued [Dyspnea on exertion] : dyspnea during exertion [Negative] : Heme/Lymph [Fever] : no fever [Chills] : no chills [Chest Pain] : no chest pain [Lower Ext Edema] : no extremity edema [Leg Claudication] : no intermittent leg claudication

## 2021-03-21 NOTE — PHYSICAL EXAM
[General Appearance - Well Developed] : well developed [Normal Appearance] : normal appearance [General Appearance - Well Nourished] : well nourished [No Deformities] : no deformities [Normal Conjunctiva] : the conjunctiva exhibited no abnormalities [Normal Oral Mucosa] : normal oral mucosa [Heart Rate And Rhythm] : heart rate and rhythm were normal [Heart Sounds] : normal S1 and S2 [] : no respiratory distress [Respiration, Rhythm And Depth] : normal respiratory rhythm and effort [Exaggerated Use Of Accessory Muscles For Inspiration] : no accessory muscle use [Auscultation Breath Sounds / Voice Sounds] : lungs were clear to auscultation bilaterally [Bowel Sounds] : normal bowel sounds [Abdomen Soft] : soft [Abdomen Tenderness] : non-tender [Abnormal Walk] : normal gait [Nail Clubbing] : no clubbing of the fingernails [Cyanosis, Localized] : no localized cyanosis [Skin Color & Pigmentation] : normal skin color and pigmentation [Skin Turgor] : normal skin turgor [Oriented To Time, Place, And Person] : oriented to person, place, and time [Impaired Insight] : insight and judgment were intact [Affect] : the affect was normal [Mood] : the mood was normal [FreeTextEntry1] : 2/6 holosystolic murmur at the apex

## 2021-03-21 NOTE — HISTORY OF PRESENT ILLNESS
[FreeTextEntry1] : Mr. eJfferson is a 60 year old male followed and referred by Dr. Triana of heart failure for evaluation of mitral regurgitation. To review PMH includes, ICM/HFrEF (EF initially 27%, LVEDD 6.5 cm) s/p [employer] ICD 4/19, CAD s/p Impella-Assisted PCI on 11/20/18 w/ 1 stent to pLAD w/ residual  of RCA/LCx, mod-severe MR, and T2DM (A1c 6.3% 6/22/20).  He underwent cardiac viability study on 3/26/20 which showed large moderate-severe defects in basal anterolateral, inferior, inferolateral and inferoapical walls that were fixed c/w infarct but had some viability in mid-anterolateral and distal lateral walls as well as preserved perfusion on rest of anterior, septal and apical walls. Based on this, did not appear revascularization of RCA/LCx would be indicated. \par He then underwent a CPET (full results below) on 8/11/20 with peak VO2 of 14.4 (43% predicted) and VO2 at AT of 4.6 (<40% predicted) with VE/VCO2 of 35 consistent with impaired cardiopulmonary status with possible element of deconditioning and was referred to cardiac rehab. He was seen earlier this week by the heart failure team where he reported new onset ARAIZA and fatigue for the past 2 months. \par \par TTE from 10/20/2020 showed Mild dilated LA, Tethered mitral valve leaflets with normal opening. mod-severe, eccentric and laterally directed MR. EF 27%\par \par He presents today and reports he has felt increasing fatigue over the past 2 months as well as worsening SOB.  He reports he has felt winded walking up and down his stairs at home (2 flights) that was not the case prior to 2 months ago.  He states that if he is carrying something while walking the steps his ARAIZA is even more.  Reports he is still able to walk on flat surfaces but does note its less over the past 2 months.  Walks on treadmill (4.2mph) 65 minutes a day X 3-4 times a week. Denies swelling to abdomen or BL LE. Has some weight gain (5 lbs in past 2 months) but attributes this to his diet.  He reports he got a puppy 2 months ago and at first thought this was due to his changed sleep habits with the new dog. Able to care for ADL's independently.  Worked as [place]  but is been on disability since his MI and PPM. Denies palpitations, sleeps with one pillow and denies orthopnea. (NYHA II).  \par

## 2021-03-22 ENCOUNTER — APPOINTMENT (OUTPATIENT)
Dept: CARDIOTHORACIC SURGERY | Facility: HOSPITAL | Age: 61
End: 2021-03-22

## 2021-03-22 ENCOUNTER — INPATIENT (INPATIENT)
Facility: HOSPITAL | Age: 61
LOS: 2 days | Discharge: HOME | DRG: 266 | End: 2021-03-25
Attending: STUDENT IN AN ORGANIZED HEALTH CARE EDUCATION/TRAINING PROGRAM | Admitting: STUDENT IN AN ORGANIZED HEALTH CARE EDUCATION/TRAINING PROGRAM
Payer: COMMERCIAL

## 2021-03-22 VITALS
DIASTOLIC BLOOD PRESSURE: 69 MMHG | SYSTOLIC BLOOD PRESSURE: 115 MMHG | HEART RATE: 60 BPM | HEIGHT: 71 IN | TEMPERATURE: 98 F | WEIGHT: 185.19 LBS | OXYGEN SATURATION: 97 % | RESPIRATION RATE: 16 BRPM

## 2021-03-22 DIAGNOSIS — I25.10 ATHEROSCLEROTIC HEART DISEASE OF NATIVE CORONARY ARTERY WITHOUT ANGINA PECTORIS: ICD-10-CM

## 2021-03-22 DIAGNOSIS — I34.9 NONRHEUMATIC MITRAL VALVE DISORDER, UNSPECIFIED: ICD-10-CM

## 2021-03-22 DIAGNOSIS — I25.5 ISCHEMIC CARDIOMYOPATHY: ICD-10-CM

## 2021-03-22 DIAGNOSIS — Z95.810 PRESENCE OF AUTOMATIC (IMPLANTABLE) CARDIAC DEFIBRILLATOR: Chronic | ICD-10-CM

## 2021-03-22 DIAGNOSIS — I34.0 NONRHEUMATIC MITRAL (VALVE) INSUFFICIENCY: ICD-10-CM

## 2021-03-22 LAB
ALBUMIN SERPL ELPH-MCNC: 4 G/DL — SIGNIFICANT CHANGE UP (ref 3.3–5)
ALP SERPL-CCNC: 51 U/L — SIGNIFICANT CHANGE UP (ref 40–120)
ALT FLD-CCNC: 20 U/L — SIGNIFICANT CHANGE UP (ref 10–45)
ANION GAP SERPL CALC-SCNC: 12 MMOL/L — SIGNIFICANT CHANGE UP (ref 5–17)
APTT BLD: 27.1 SEC — LOW (ref 27.5–35.5)
AST SERPL-CCNC: 32 U/L — SIGNIFICANT CHANGE UP (ref 10–40)
BILIRUB SERPL-MCNC: 1.3 MG/DL — HIGH (ref 0.2–1.2)
BLD GP AB SCN SERPL QL: NEGATIVE — SIGNIFICANT CHANGE UP
BUN SERPL-MCNC: 15 MG/DL — SIGNIFICANT CHANGE UP (ref 7–23)
CALCIUM SERPL-MCNC: 8.8 MG/DL — SIGNIFICANT CHANGE UP (ref 8.4–10.5)
CHLORIDE SERPL-SCNC: 105 MMOL/L — SIGNIFICANT CHANGE UP (ref 96–108)
CO2 SERPL-SCNC: 20 MMOL/L — LOW (ref 22–31)
CREAT SERPL-MCNC: 0.84 MG/DL — SIGNIFICANT CHANGE UP (ref 0.5–1.3)
GAS PNL BLDA: SIGNIFICANT CHANGE UP
GAS PNL BLDA: SIGNIFICANT CHANGE UP
GLUCOSE SERPL-MCNC: 112 MG/DL — HIGH (ref 70–99)
HCT VFR BLD CALC: 40.8 % — SIGNIFICANT CHANGE UP (ref 39–50)
HGB BLD-MCNC: 13.8 G/DL — SIGNIFICANT CHANGE UP (ref 13–17)
INR BLD: 1.18 RATIO — HIGH (ref 0.88–1.16)
MAGNESIUM SERPL-MCNC: 1.9 MG/DL — SIGNIFICANT CHANGE UP (ref 1.6–2.6)
MCHC RBC-ENTMCNC: 31.1 PG — SIGNIFICANT CHANGE UP (ref 27–34)
MCHC RBC-ENTMCNC: 33.8 GM/DL — SIGNIFICANT CHANGE UP (ref 32–36)
MCV RBC AUTO: 91.9 FL — SIGNIFICANT CHANGE UP (ref 80–100)
NRBC # BLD: 0 /100 WBCS — SIGNIFICANT CHANGE UP (ref 0–0)
PHOSPHATE SERPL-MCNC: 3.3 MG/DL — SIGNIFICANT CHANGE UP (ref 2.5–4.5)
PLATELET # BLD AUTO: 133 K/UL — LOW (ref 150–400)
POTASSIUM SERPL-MCNC: 4.1 MMOL/L — SIGNIFICANT CHANGE UP (ref 3.5–5.3)
POTASSIUM SERPL-SCNC: 4.1 MMOL/L — SIGNIFICANT CHANGE UP (ref 3.5–5.3)
PROT SERPL-MCNC: 6.2 G/DL — SIGNIFICANT CHANGE UP (ref 6–8.3)
PROTHROM AB SERPL-ACNC: 14.1 SEC — HIGH (ref 10.6–13.6)
RBC # BLD: 4.44 M/UL — SIGNIFICANT CHANGE UP (ref 4.2–5.8)
RBC # FLD: 13.2 % — SIGNIFICANT CHANGE UP (ref 10.3–14.5)
RH IG SCN BLD-IMP: NEGATIVE — SIGNIFICANT CHANGE UP
SARS-COV-2 RNA SPEC QL NAA+PROBE: SIGNIFICANT CHANGE UP
SODIUM SERPL-SCNC: 137 MMOL/L — SIGNIFICANT CHANGE UP (ref 135–145)
WBC # BLD: 12.93 K/UL — HIGH (ref 3.8–10.5)
WBC # FLD AUTO: 12.93 K/UL — HIGH (ref 3.8–10.5)

## 2021-03-22 PROCEDURE — 93010 ELECTROCARDIOGRAM REPORT: CPT | Mod: 77

## 2021-03-22 PROCEDURE — 93010 ELECTROCARDIOGRAM REPORT: CPT

## 2021-03-22 PROCEDURE — 33418 REPAIR TCAT MITRAL VALVE: CPT | Mod: 62,Q0

## 2021-03-22 PROCEDURE — 99291 CRITICAL CARE FIRST HOUR: CPT

## 2021-03-22 PROCEDURE — 71045 X-RAY EXAM CHEST 1 VIEW: CPT | Mod: 26

## 2021-03-22 PROCEDURE — 93355 ECHO TRANSESOPHAGEAL (TEE): CPT

## 2021-03-22 PROCEDURE — 33418 REPAIR TCAT MITRAL VALVE: CPT | Mod: Q0,62

## 2021-03-22 RX ORDER — ATORVASTATIN CALCIUM 80 MG/1
80 TABLET, FILM COATED ORAL AT BEDTIME
Refills: 0 | Status: DISCONTINUED | OUTPATIENT
Start: 2021-03-22 | End: 2021-03-25

## 2021-03-22 RX ORDER — CLOPIDOGREL BISULFATE 75 MG/1
75 TABLET, FILM COATED ORAL DAILY
Refills: 0 | Status: DISCONTINUED | OUTPATIENT
Start: 2021-03-23 | End: 2021-03-25

## 2021-03-22 RX ORDER — PANTOPRAZOLE SODIUM 20 MG/1
40 TABLET, DELAYED RELEASE ORAL
Refills: 0 | Status: DISCONTINUED | OUTPATIENT
Start: 2021-03-22 | End: 2021-03-25

## 2021-03-22 RX ORDER — CEFUROXIME AXETIL 250 MG
1500 TABLET ORAL EVERY 8 HOURS
Refills: 0 | Status: COMPLETED | OUTPATIENT
Start: 2021-03-22 | End: 2021-03-23

## 2021-03-22 RX ORDER — PHENYLEPHRINE HYDROCHLORIDE 10 MG/ML
0.5 INJECTION INTRAVENOUS
Qty: 40 | Refills: 0 | Status: DISCONTINUED | OUTPATIENT
Start: 2021-03-22 | End: 2021-03-24

## 2021-03-22 RX ORDER — SODIUM CHLORIDE 9 MG/ML
3 INJECTION INTRAMUSCULAR; INTRAVENOUS; SUBCUTANEOUS EVERY 8 HOURS
Refills: 0 | Status: DISCONTINUED | OUTPATIENT
Start: 2021-03-22 | End: 2021-03-22

## 2021-03-22 RX ORDER — ASPIRIN/CALCIUM CARB/MAGNESIUM 324 MG
81 TABLET ORAL DAILY
Refills: 0 | Status: DISCONTINUED | OUTPATIENT
Start: 2021-03-22 | End: 2021-03-25

## 2021-03-22 RX ORDER — ACETAMINOPHEN 500 MG
650 TABLET ORAL EVERY 6 HOURS
Refills: 0 | Status: DISCONTINUED | OUTPATIENT
Start: 2021-03-22 | End: 2021-03-25

## 2021-03-22 RX ORDER — LIDOCAINE HCL 20 MG/ML
0.2 VIAL (ML) INJECTION ONCE
Refills: 0 | Status: DISCONTINUED | OUTPATIENT
Start: 2021-03-22 | End: 2021-03-22

## 2021-03-22 RX ORDER — INSULIN LISPRO 100/ML
VIAL (ML) SUBCUTANEOUS
Refills: 0 | Status: DISCONTINUED | OUTPATIENT
Start: 2021-03-22 | End: 2021-03-25

## 2021-03-22 RX ORDER — CEFUROXIME AXETIL 250 MG
1500 TABLET ORAL ONCE
Refills: 0 | Status: COMPLETED | OUTPATIENT
Start: 2021-03-22 | End: 2021-03-22

## 2021-03-22 RX ORDER — POLYETHYLENE GLYCOL 3350 17 G/17G
17 POWDER, FOR SOLUTION ORAL DAILY
Refills: 0 | Status: DISCONTINUED | OUTPATIENT
Start: 2021-03-22 | End: 2021-03-25

## 2021-03-22 RX ADMIN — Medication 650 MILLIGRAM(S): at 16:11

## 2021-03-22 RX ADMIN — PANTOPRAZOLE SODIUM 40 MILLIGRAM(S): 20 TABLET, DELAYED RELEASE ORAL at 13:36

## 2021-03-22 RX ADMIN — POLYETHYLENE GLYCOL 3350 17 GRAM(S): 17 POWDER, FOR SOLUTION ORAL at 13:46

## 2021-03-22 RX ADMIN — Medication 650 MILLIGRAM(S): at 16:26

## 2021-03-22 RX ADMIN — Medication 100 MILLIGRAM(S): at 21:34

## 2021-03-22 RX ADMIN — Medication 100 MILLIGRAM(S): at 13:35

## 2021-03-22 RX ADMIN — ATORVASTATIN CALCIUM 80 MILLIGRAM(S): 80 TABLET, FILM COATED ORAL at 21:34

## 2021-03-22 RX ADMIN — PHENYLEPHRINE HYDROCHLORIDE 15.8 MICROGRAM(S)/KG/MIN: 10 INJECTION INTRAVENOUS at 13:46

## 2021-03-22 NOTE — PROGRESS NOTE ADULT - SUBJECTIVE AND OBJECTIVE BOX
CRITICAL CARE ATTENDING - CTICU    MEDICATIONS  (STANDING):  cefuroxime  IVPB 1500 milliGRAM(s) IV Intermittent once  phenylephrine    Infusion 0.5 MICROgram(s)/kG/Min (15.8 mL/Hr) IV Continuous <Continuous>                                  Daily Height in cm: 180 (22 Mar 2021 06:36)    Daily         Critically Ill patient  : [ ] preoperative ,   [x ] post operative    Requires :  [ x] Arterial Line   [x ] Central Line  [ ] PA catheter  [ ] IABP  [ ] ECMO  [ ] LVAD  [ ] Ventilator  [ ] pacemaker [ ] Impella.                      [ x] ABG's     [x ] Pulse Oxymetry Monitoring  Bedside evaluation , monitoring , treatment of hemodynamics , fluids , IVP/ IVCD meds.        Diagnosis:     Op Day - MV Clip    Hypotension    Hypovolemia    Hemodynamic lability,  instability. Requires IVCD [ x] vasopressors [ ] inotropes  [ ] vasodilator  [x ]IVSS fluid  to maintain MAP, perfusion, C.I.     PPM    DM     CHF- acute [ x]   chronic [ x]    systolic [x ]   diatolic [ ]          - Echo- EF - 30%  / MR          [ ] RV dysfunction          - Cxr-cardiomegally, edema          - Clinical-  [ ]inotropes   [x ]pressors   [ ]diuresis   [ ]IABP   [ ]ECMO   [ ]LVAD   [ ]Respiratory Failure    No catheters in either femoral artery or vein - Left Deployment site - no hematomas  or swelling     CAD with stents                             -                     Discussed with CT surgeon, Physician's Assistant - Nurse Practitioner- Critical care medicine team.   Discussed at  AM / PM rounds.   Chart, labs , films reviewed.    Cumulative Critical Care Time Given Today : 30 min

## 2021-03-22 NOTE — CONSULT NOTE ADULT - ASSESSMENT
60M hx of ACC/AHA Stage C HF with NYHA Class I-II symptoms, ICM / HFrEF EF 25% s/p ICD 4/19, CAD s/p PCI 11/2018 to pLAD w/ residual  RCA/LCx, mod-severe MR and DM2 here for elective mitral clip. 60M hx of ACC/AHA Stage C HF with NYHA Class I-II symptoms, ICM / HFrEF EF 25% s/p ICD 4/19, CAD s/p PCI 11/2018 to pLAD w/ residual  RCA/LCx, severe MR and DM2 here for elective mitral clip.

## 2021-03-22 NOTE — PRE-ANESTHESIA EVALUATION ADULT - LAST ECHOCARDIOGRAM
10/20/2020 Mild dilated LA, Tethered mitral valve leaflets with normal opening, mod-severe, eccentric & laterally directed MR LVEF 27%

## 2021-03-22 NOTE — PRE-ANESTHESIA EVALUATION ADULT - LAST STRESS TEST
8/11/2020 CPET 10:53 min; 4/1 METS; peak VO2 14/4 (43% predicted); VO2 at AT 4.6ml/kg/min (<40% predicted); O2/pulse was low with VO2/work slope of 8/1 (reduced). VE/VCO2 35; OUES 0.9L/min (reduced) c/w Peres class C cardiac failure with possible element of deconditioning

## 2021-03-22 NOTE — CONSULT NOTE ADULT - ATTENDING COMMENTS
Briefly, 60M hx of ICM/HFrEF (EF initially 15-20%, LVEDD 6cm, improved to 25%), s/p ICD 4/19, CAD s/p impella-assisted PCI on 11/20/18 w/ pLAD PCI w/ residual  of RCA/LCx, severe MR, DM2 here for elective mitral clip procedure for progressively worsening ARAIZA related to severe MR despite maximal medical therapy. Recent R/LHC with normal hemodynamics and preserved CI and unchanged 2 vessel CAD (occluded RCA and LCx). Underwent mitral clip today across A2/P2 with good result. Feels well. On jourdan for hypotension. Appears hypovolemic on exam, otherwise pefusing. Labs reviewed.   - hold home meds as above  - wean jourdan as tolerated  - can give  cc for hypotension  - f/u TTE post-procedure

## 2021-03-22 NOTE — CONSULT NOTE ADULT - PROBLEM SELECTOR RECOMMENDATION 2
-severe symptomatic MR s/p Mitral clip 3/22  -intraop VERN with significant improvement in MR  -currently on phenylephrine; expect to wean off further out from procedure  -will need repeat TTE per structural cardiology

## 2021-03-22 NOTE — PRE-ANESTHESIA EVALUATION ADULT - NSANTHPMHFT_GEN_ALL_CORE
Medical record reviewed including: CAD HFrEF 30% ICD (BS) Impella assist PCI to LAD (patent) chronic occluded RCA/OM1 with RWMA Dilated LV/LAE severe functional MR EKG IMI ANNEMARIE Carotids Neg DM2 Cr .72

## 2021-03-22 NOTE — CONSULT NOTE ADULT - SUBJECTIVE AND OBJECTIVE BOX
Patient seen and evaluated at bedside    Chief Complaint:  elective mitral clip    HPI: 60M hx of ICM/HFref (EF initially 15-20%, LVEDD 6cm, improved to 25%), s/p ICD 4/19, CAD s/p impella-assisted PCI on 11/20/18 w/ pLAD PCI w/ residual  of RCA/LCx, mo-dsevere MR, DM2 here for elective mitral clip procedure for progressively worsening ARAIZA related to severe MR despite maximal medical therapy.  HF was consulted for post-procedural cardiac care.       PMHx:   Chronic systolic heart failure    Type 2 diabetes mellitus    Nonrheumatic mitral valve regurgitation    Stented coronary artery    Coronary artery disease due to lipid rich plaque    Acute myocardial infarction    Ischemic cardiomyopathy with implantable cardioverter-defibrillator (ICD)    Hyperlipidemia    Cardiomyopathy    Diabetes mellitus        PSHx:   History of implantable cardioverter-defibrillator (ICD) insertion    No significant past surgical history        Allergies:  No Known Allergies      Home Meds: reviewed    Current Medications:   acetaminophen   Tablet .. 650 milliGRAM(s) Oral every 6 hours PRN  aspirin  chewable 81 milliGRAM(s) Oral daily  atorvastatin 80 milliGRAM(s) Oral at bedtime  cefuroxime  IVPB 1500 milliGRAM(s) IV Intermittent every 8 hours  insulin lispro (ADMELOG) corrective regimen sliding scale   SubCutaneous Before meals and at bedtime  pantoprazole    Tablet 40 milliGRAM(s) Oral before breakfast  phenylephrine    Infusion 0.5 MICROgram(s)/kG/Min IV Continuous <Continuous>  polyethylene glycol 3350 17 Gram(s) Oral daily      FAMILY HISTORY:  Family history of thyroid disease (Mother)    Family history of Parkinson disease (Mother)    Family history of hypertension (Father)    Family history of diabetes mellitus in father (Father)    Family history of CABG (Father)        Social History:  Smoking History: denies  Alcohol Use: denies  Drug Use: denies    REVIEW OF SYSTEMS:  Constitutional:     [x ] negative [ ] fevers [ ] chills [ ] weight loss [ ] weight gain  HEENT:                  [x ] negative [ ] dry eyes [ ] eye irritation [ ] postnasal drip [ ] nasal congestion  CV:                         [ x] negative  [ ] chest pain [ ] orthopnea [ ] palpitations [ ] murmur  Resp:                     [x ] negative [ ] cough [ ] shortness of breath [ ] dyspnea [ ] wheezing [ ] sputum [ ]hemoptysis  GI:                          [ x] negative [ ] nausea [ ] vomiting [ ] diarrhea [ ] constipation [ ] abd pain [ ] dysphagia   :                        [ x] negative [ ] dysuria [ ] nocturia [ ] hematuria [ ] increased urinary frequency  Musculoskeletal: [x ] negative [ ] back pain [ ] myalgias [ ] arthralgias [ ] fracture  Skin:                       [ x] negative [ ] rash [ ] itch  Neurological:        [ x] negative [ ] headache [ ] dizziness [ ] syncope [ ] weakness [ ] numbness  Psychiatric:           [ x] negative [ ] anxiety [ ] depression  Endocrine:            [ x] negative [ ] diabetes [ ] thyroid problem  Heme/Lymph:      [ x] negative [ ] anemia [ ] bleeding problem  Allergic/Immune: [ x] negative [ ] itchy eyes [ ] nasal discharge [ ] hives [ ] angioedema    [ x] All other systems negative  [ ] Unable to assess ROS due to      Physical Exam:  T(F): 97.8 (03-22), Max: 98.2 (03-22)  HR: 60 (03-22) (60 - 60)  BP: 115/69 (03-22) (115/69 - 115/69)  RR: 10 (03-22)  SpO2: 96% (03-22)  GENERAL: No acute distress, well-developed  HEAD:  Atraumatic, Normocephalic  ENT: EOMI, PERRLA, conjunctiva and sclera clear, Neck supple, No JVD, moist mucosa  CHEST/LUNG: Clear to auscultation bilaterally; No wheeze, equal breath sounds bilaterally   BACK: No spinal tenderness  HEART: Regular rate and rhythm; No murmurs, rubs, or gallops  ABDOMEN: Soft, Nontender, Nondistended; Bowel sounds present  EXTREMITIES:  No clubbing, cyanosis, or edema  PSYCH: Nl behavior, nl affect  NEUROLOGY: AAOx3, non-focal, cranial nerves intact  SKIN: Normal color, No rashes or lesions  LINES:    Cardiovascular Diagnostic Testing:    ECG: Personally reviewed:    Echo: Personally reviewed:    Stress Testing:    Cath:  3/22/21  INDICATIONS: Severe mixed mitral valve regurgitation  PROCEDURE:  --  Sonosite - Diagnostic.  --  Intubation-Non-cath Physician.  --  Transesophageal Echo During Cath.  --  Transseptal Needle Puncture.  --  Jessica Clip Procedure.  TECHNIQUE: The risks and alternatives of the procedures and conscious  sedation were explained to the patient and informed consent was obtained.  Cardiac catheterization performed electively.  Local anesthetic given. Right femoral vein access. Left femoral vein  access. Sonosite - Diagnostic. Intubation-Non-cath Physician.  Transesophageal Echo During Cath. RADIATION EXPOSURE: 70.1 min.  The risks and benefits were thoroughly discussed with the patient. After  written informed consent was obtained, the patient was brought into the  hybrid operating room and was prepped and draped in the usual sterile  fashion. Antibiotics were administered 30 minutes prior to initiation of  the procedure. The patient was positioned and prepped accordingly for the  MitraClip procedure.  General anesthesia placed an arterial line in the wrist and anesthesia was  administered. A VERN was performed by Dr. Brown. Using ultrasound guidance  the right femoral vein was puncture and an 8Fr sheath was inserted. The  sheath was upsized to a 14Fr sheath. .  Through the 14Fr Cook sheath, Dr. Castañeda brought up a 8.5 [place]  transeptal system over a 0.032-inch ProTrack pigtail guidewire. A  transseptal puncture was then performed under echocardiography guidance  using the pullback technique to determine tenting between the muscular and  membranous septum. The intra-atrial septum was punctured in a  mid-posterior superior location. Tenting was observed in the bicaval view,  short axis view and the 4 chamber view prior to puncture. In the  four-chamber view the line of coaptation was determined and the superior  aspect of the fossa was assessed. The height was determined to be around  4.8cm. Following puncture the transseptal sheath was advanced to the  middle of the left atrium over the ProTrack pigtail wire. The transseptal  sheath was flushed and the patient was heparinized. The ACT was checked  throughout the entire procedure with its goal to be maintained to be  greater than 300. The steerable guide catheter and central delivery system  was prepped by the team according to the 's recommendations.  The 8.5 [place] sheath and the 14 Cook sheath were removed from the  body. The puncture site was serially dilated. The steerable guide catheter  was straightened and the surface of the guide shaft was wiped down with  wet saline. The guide dilator assembly was inserted over the stationary  guidewire into the femoral vein by Dr. Castañeda. Once the guide dilator  assembly was in the right atrium the plus/minus knob was rotated to  neutral. The tip of the dilator was placed partially across the atrial  septum.  The steerable guide catheter and dilator was advanced into the left atrium  over the ProTrack pigtail wire into the left atrium by Dr. Castañeda. The  steerable guide was then secured on the stabilizer using the fastener. The  wire was then retracted into the tip of the dilator. The wire was removed  by Dr. Jarqiun while the steerable guide was aspirated using a 60 mL syringe by  Dr. Castañeda. Care was taken to make sure that no air entered the lumen of  the guide. The guide lumenwas confirmed to be completely de-aired. It was  noted that there was slow continuous heparin flushes through both the  sleeve and the delivery catheter.  The G4 MitraClip NTW was prepared by the team in the standard fashion and  was carefully advanced into the left atrium through the device deployment  sheath under fluoroscopy and VERN guidance.  The G4 Mitraclip NTW was placed just proximal to the tip of the clip  introducer. While Dr. Jarquin was flushing heparinized saline on the guide  hemostasis valve the tip of the clip introducer was placed against the  guide hemostasis valve by Dr. Castañeda who advanced it in a continuous  motion until the clip was observed distal to the valve. The longitudinal  marker on the sleeve shaft was aligned with the alignment marker on the  guide hemostasis valve. Under echocardiography guidance the Mitraclip  delivery system was advanced and retraction of the guide was performed in  an iterative fashion while maintaining the guide in the left atrium. The  sleeve radiopaque alignment markers were seen to straddle the guide  radiopaque tip ring.  Positioning of the MitraClip system was achieved using iterative  adjustments of the guide and clip delivery system using torque,  translation and knob adjustments.  Dr. Castañeda, positioned the clip over A2P2. The clip was positioned  approximately 1 cm above the leaflets. Transesophageal echocardiography in  conjunction with 3-D and fluoroscopy was used to assist with positioning.  Care was taken to avoid structures and tissue. The guide was adjusted to  maintain that the clip was free from adjacent tissue. The clip path was  observed in both the anteroposterior and mediolateral positions in  relationship to the mitral regurgitation origin.  Once final positioning was determined the grippers were raised. The clip  was unlocked and the clip arms were opened to approximately 120°. The  delivery catheter shift was translated to release torque. Adjustments were  made to reposition the clip as necessary in the left atrium using multiple  imaging planes by Dr. Castañeda and Dr. Jarquni. Axial alignment was verified to  reassess the anterior posterior and mediolateral alignment prior to  crossing the mitral valve. It was seen that the clip under fluoroscopy was  pointing to the apex. 3-D on face for clip arm alignment was performed.  The clip was advanced into the left ventricle approximately 1 cm below the  valve in a closed position. The clip arms were then opened and noted to be  oriented perpendicular to the line of coaptation. Fluoroscopy was  performed to check for possible clip rotation. It was determined that  there was axial alignment in all views, arms were perpendicular to line of  coaptation, leaflets were freely moving above arms before grasp, grippers  fully raised, clip close to grasping arm angle of the proximal 120° prior  to grasping of the anterior and posterior leaflets.  The steerable deployment sheath was manipulated by Dr. Castañeda and the clip  was grasped by Dr. Jarquin. After closure of the clip, there was significant  reduction of the mitral regurgitation jet to mild. There was excellent  grasping and tissue bridge. There was a 3mmHg pressure gradient. 3D  imaging confirmed excellent tissue bridging.  Prior to shaft detachment and final clip detachment several minutes were  spent observing leaflet immobilization, valve orifices, leaflet mobility  relative to the tips of the clip arms and mitral regurgitation reduction.  The shaft was detached from the clip. The gripper line was then removed by  Dr. Jarquin. There was no change in the degree of mitral regurgitation. The  mean transmitral gradient was 3mmHg.  Removal of the clip delivery system was then performed by Dr. Castañeda while  leaving the guide in position. It was confirmed that the clip introducer  was advanced into the guide hemostasis valve. Using a 60 mL syringe,  aspiration of the guide was performed to prevent air entry by Dr. Castañeda.  Biplane imaging was used to confirm secure leaflet attachment. Atrial  septalpuncture site demonstrated a residual defect with a bidirectional  shunt flow. There was no pericardial effusion.  The deployment sheath was removed and a mattress suture was placed around  the sheaths. Pressure was held for 15 minutes.  There was excellent hemostasis.  The patient tolerated the procedure well without complications and was  extubated.  . Transseptal Needle Puncture. Jessica Clip Procedure.  MEDICATIONS GIVEN: Heparin, 4000 units, IV. Heparin, 3000 units, IV.  Heparin, 2000 units, IV.  COMPLICATIONS: There were no complications.  DIAGNOSTIC IMPRESSIONS: Status post successful deployment of a G4 MitraClip  NTW device with a reduction of mitral regurgitation from severe to mild  DIAGNOSTIC RECOMMENDATIONS: Keep right leg straight for 4 hours following  removal of sheath  Remove mattress suture in 24 hours  Continue aspirin 81mg daily  Continue clopidogrel 75mg daily  Recommend prophylactic antibiotics prior to any procedure  Continue aggressive medical management  A repeat TTEwill be performed prior to discharge  INTERVENTIONAL IMPRESSIONS: Status post successful deployment of a G4  MitraClip NTW device with a reduction of mitral regurgitation from severe  to mild  INTERVENTIONAL RECOMMENDATIONS: Keep right leg straight for 4 hours  following removal of sheath  Remove mattress suture in 24 hours  Continue aspirin 81mg daily  Continue clopidogrel 75mg daily  Recommend prophylactic antibiotics prior to any procedure  Continue aggressive medical management  A repeat TTE will be performed prior to discharge  Prepared and signed by      Imaging:    CXR: Personally reviewed    Labs: Personally reviewed                        13.8   12.93 )-----------( 133      ( 22 Mar 2021 13:06 )             40.8     03-22    137  |  105  |  15  ----------------------------<  112<H>  4.1   |  20<L>  |  0.84    Ca    8.8      22 Mar 2021 13:06  Phos  3.3     03-22  Mg     1.9     03-22    TPro  6.2  /  Alb  4.0  /  TBili  1.3<H>  /  DBili  x   /  AST  32  /  ALT  20  /  AlkPhos  51  03-22    PT/INR - ( 22 Mar 2021 13:06 )   PT: 14.1 sec;   INR: 1.18 ratio         PTT - ( 22 Mar 2021 13:06 )  PTT:27.1 sec         Patient seen and evaluated at bedside    Chief Complaint:  elective mitral clip    HPI: 60M hx of ICM/HFref (EF initially 15-20%, LVEDD 6cm, improved to 25%), s/p ICD 4/19, CAD s/p impella-assisted PCI on 11/20/18 w/ pLAD PCI w/ residual  of RCA/LCx, severe MR, DM2 here for elective mitral clip procedure for progressively worsening ARAIZA related to severe MR despite maximal medical therapy.  HF was consulted for post-procedural cardiac care.       PMHx:   Chronic systolic heart failure    Type 2 diabetes mellitus    Nonrheumatic mitral valve regurgitation    Stented coronary artery    Coronary artery disease due to lipid rich plaque    Acute myocardial infarction    Ischemic cardiomyopathy with implantable cardioverter-defibrillator (ICD)    Hyperlipidemia    Cardiomyopathy    Diabetes mellitus        PSHx:   History of implantable cardioverter-defibrillator (ICD) insertion    No significant past surgical history        Allergies:  No Known Allergies      Home Meds: reviewed    Current Medications:   acetaminophen   Tablet .. 650 milliGRAM(s) Oral every 6 hours PRN  aspirin  chewable 81 milliGRAM(s) Oral daily  atorvastatin 80 milliGRAM(s) Oral at bedtime  cefuroxime  IVPB 1500 milliGRAM(s) IV Intermittent every 8 hours  insulin lispro (ADMELOG) corrective regimen sliding scale   SubCutaneous Before meals and at bedtime  pantoprazole    Tablet 40 milliGRAM(s) Oral before breakfast  phenylephrine    Infusion 0.5 MICROgram(s)/kG/Min IV Continuous <Continuous>  polyethylene glycol 3350 17 Gram(s) Oral daily      FAMILY HISTORY:  Family history of thyroid disease (Mother)    Family history of Parkinson disease (Mother)    Family history of hypertension (Father)    Family history of diabetes mellitus in father (Father)    Family history of CABG (Father)        Social History:  Smoking History: denies  Alcohol Use: denies  Drug Use: denies    REVIEW OF SYSTEMS:  Constitutional:     [x ] negative [ ] fevers [ ] chills [ ] weight loss [ ] weight gain  HEENT:                  [x ] negative [ ] dry eyes [ ] eye irritation [ ] postnasal drip [ ] nasal congestion  CV:                         [ x] negative  [ ] chest pain [ ] orthopnea [ ] palpitations [ ] murmur  Resp:                     [x ] negative [ ] cough [ ] shortness of breath [ ] dyspnea [ ] wheezing [ ] sputum [ ]hemoptysis  GI:                          [ x] negative [ ] nausea [ ] vomiting [ ] diarrhea [ ] constipation [ ] abd pain [ ] dysphagia   :                        [ x] negative [ ] dysuria [ ] nocturia [ ] hematuria [ ] increased urinary frequency  Musculoskeletal: [x ] negative [ ] back pain [ ] myalgias [ ] arthralgias [ ] fracture  Skin:                       [ x] negative [ ] rash [ ] itch  Neurological:        [ x] negative [ ] headache [ ] dizziness [ ] syncope [ ] weakness [ ] numbness  Psychiatric:           [ x] negative [ ] anxiety [ ] depression  Endocrine:            [ x] negative [ ] diabetes [ ] thyroid problem  Heme/Lymph:      [ x] negative [ ] anemia [ ] bleeding problem  Allergic/Immune: [ x] negative [ ] itchy eyes [ ] nasal discharge [ ] hives [ ] angioedema    [ x] All other systems negative  [ ] Unable to assess ROS due to      Physical Exam:  T(F): 97.8 (03-22), Max: 98.2 (03-22)  HR: 60 (03-22) (60 - 60)  BP: 115/69 (03-22) (115/69 - 115/69)  RR: 10 (03-22)  SpO2: 96% (03-22)  GENERAL: No acute distress, well-developed  HEAD:  Atraumatic, Normocephalic  ENT: EOMI, PERRLA, conjunctiva and sclera clear, Neck supple, No JVD, moist mucosa  CHEST/LUNG: Clear to auscultation bilaterally; No wheeze, equal breath sounds bilaterally   BACK: No spinal tenderness  HEART: Regular rate and rhythm; No murmurs, rubs, or gallops  ABDOMEN: Soft, Nontender, Nondistended; Bowel sounds present  EXTREMITIES:  No clubbing, cyanosis, or edema  PSYCH: Nl behavior, nl affect  NEUROLOGY: AAOx3, non-focal, cranial nerves intact  SKIN: Normal color, No rashes or lesions  LINES:    Cardiovascular Diagnostic Testing:    ECG: Personally reviewed:    Echo: Personally reviewed:    Stress Testing:    Cath:  3/22/21  INDICATIONS: Severe mixed mitral valve regurgitation  PROCEDURE:  --  Sonosite - Diagnostic.  --  Intubation-Non-cath Physician.  --  Transesophageal Echo During Cath.  --  Transseptal Needle Puncture.  --  Jessica Clip Procedure.  TECHNIQUE: The risks and alternatives of the procedures and conscious  sedation were explained to the patient and informed consent was obtained.  Cardiac catheterization performed electively.  Local anesthetic given. Right femoral vein access. Left femoral vein  access. Sonosite - Diagnostic. Intubation-Non-cath Physician.  Transesophageal Echo During Cath. RADIATION EXPOSURE: 70.1 min.  The risks and benefits were thoroughly discussed with the patient. After  written informed consent was obtained, the patient was brought into the  hybrid operating room and was prepped and draped in the usual sterile  fashion. Antibiotics were administered 30 minutes prior to initiation of  the procedure. The patient was positioned and prepped accordingly for the  MitraClip procedure.  General anesthesia placed an arterial line in the wrist and anesthesia was  administered. A VERN was performed by Dr. Brown. Using ultrasound guidance  the right femoral vein was puncture and an 8Fr sheath was inserted. The  sheath was upsized to a 14Fr sheath. .  Through the 14Fr Cook sheath, Dr. Castañeda brought up a 8.5 [place]  transeptal system over a 0.032-inch ProTrack pigtail guidewire. A  transseptal puncture was then performed under echocardiography guidance  using the pullback technique to determine tenting between the muscular and  membranous septum. The intra-atrial septum was punctured in a  mid-posterior superior location. Tenting was observed in the bicaval view,  short axis view and the 4 chamber view prior to puncture. In the  four-chamber view the line of coaptation was determined and the superior  aspect of the fossa was assessed. The height was determined to be around  4.8cm. Following puncture the transseptal sheath was advanced to the  middle of the left atrium over the ProTrack pigtail wire. The transseptal  sheath was flushed and the patient was heparinized. The ACT was checked  throughout the entire procedure with its goal to be maintained to be  greater than 300. The steerable guide catheter and central delivery system  was prepped by the team according to the 's recommendations.  The 8.5 [place] sheath and the 14 Cook sheath were removed from the  body. The puncture site was serially dilated. The steerable guide catheter  was straightened and the surface of the guide shaft was wiped down with  wet saline. The guide dilator assembly was inserted over the stationary  guidewire into the femoral vein by Dr. Castañeda. Once the guide dilator  assembly was in the right atrium the plus/minus knob was rotated to  neutral. The tip of the dilator was placed partially across the atrial  septum.  The steerable guide catheter and dilator was advanced into the left atrium  over the ProTrack pigtail wire into the left atrium by Dr. Castañeda. The  steerable guide was then secured on the stabilizer using the fastener. The  wire was then retracted into the tip of the dilator. The wire was removed  by Dr. Jarquin while the steerable guide was aspirated using a 60 mL syringe by  Dr. Castañeda. Care was taken to make sure that no air entered the lumen of  the guide. The guide lumenwas confirmed to be completely de-aired. It was  noted that there was slow continuous heparin flushes through both the  sleeve and the delivery catheter.  The G4 MitraClip NTW was prepared by the team in the standard fashion and  was carefully advanced into the left atrium through the device deployment  sheath under fluoroscopy and VERN guidance.  The G4 Mitraclip NTW was placed just proximal to the tip of the clip  introducer. While Dr. Jarquin was flushing heparinized saline on the guide  hemostasis valve the tip of the clip introducer was placed against the  guide hemostasis valve by Dr. Castañeda who advanced it in a continuous  motion until the clip was observed distal to the valve. The longitudinal  marker on the sleeve shaft was aligned with the alignment marker on the  guide hemostasis valve. Under echocardiography guidance the Mitraclip  delivery system was advanced and retraction of the guide was performed in  an iterative fashion while maintaining the guide in the left atrium. The  sleeve radiopaque alignment markers were seen to straddle the guide  radiopaque tip ring.  Positioning of the MitraClip system was achieved using iterative  adjustments of the guide and clip delivery system using torque,  translation and knob adjustments.  Dr. Castañeda, positioned the clip over A2P2. The clip was positioned  approximately 1 cm above the leaflets. Transesophageal echocardiography in  conjunction with 3-D and fluoroscopy was used to assist with positioning.  Care was taken to avoid structures and tissue. The guide was adjusted to  maintain that the clip was free from adjacent tissue. The clip path was  observed in both the anteroposterior and mediolateral positions in  relationship to the mitral regurgitation origin.  Once final positioning was determined the grippers were raised. The clip  was unlocked and the clip arms were opened to approximately 120°. The  delivery catheter shift was translated to release torque. Adjustments were  made to reposition the clip as necessary in the left atrium using multiple  imaging planes by Dr. Castañeda and Dr. Jarquin. Axial alignment was verified to  reassess the anterior posterior and mediolateral alignment prior to  crossing the mitral valve. It was seen that the clip under fluoroscopy was  pointing to the apex. 3-D on face for clip arm alignment was performed.  The clip was advanced into the left ventricle approximately 1 cm below the  valve in a closed position. The clip arms were then opened and noted to be  oriented perpendicular to the line of coaptation. Fluoroscopy was  performed to check for possible clip rotation. It was determined that  there was axial alignment in all views, arms were perpendicular to line of  coaptation, leaflets were freely moving above arms before grasp, grippers  fully raised, clip close to grasping arm angle of the proximal 120° prior  to grasping of the anterior and posterior leaflets.  The steerable deployment sheath was manipulated by Dr. Castañeda and the clip  was grasped by Dr. Jarquin. After closure of the clip, there was significant  reduction of the mitral regurgitation jet to mild. There was excellent  grasping and tissue bridge. There was a 3mmHg pressure gradient. 3D  imaging confirmed excellent tissue bridging.  Prior to shaft detachment and final clip detachment several minutes were  spent observing leaflet immobilization, valve orifices, leaflet mobility  relative to the tips of the clip arms and mitral regurgitation reduction.  The shaft was detached from the clip. The gripper line was then removed by  Dr. Jarquin. There was no change in the degree of mitral regurgitation. The  mean transmitral gradient was 3mmHg.  Removal of the clip delivery system was then performed by Dr. Castañeda while  leaving the guide in position. It was confirmed that the clip introducer  was advanced into the guide hemostasis valve. Using a 60 mL syringe,  aspiration of the guide was performed to prevent air entry by Dr. Castañeda.  Biplane imaging was used to confirm secure leaflet attachment. Atrial  septalpuncture site demonstrated a residual defect with a bidirectional  shunt flow. There was no pericardial effusion.  The deployment sheath was removed and a mattress suture was placed around  the sheaths. Pressure was held for 15 minutes.  There was excellent hemostasis.  The patient tolerated the procedure well without complications and was  extubated.  . Transseptal Needle Puncture. Jessica Clip Procedure.  MEDICATIONS GIVEN: Heparin, 4000 units, IV. Heparin, 3000 units, IV.  Heparin, 2000 units, IV.  COMPLICATIONS: There were no complications.  DIAGNOSTIC IMPRESSIONS: Status post successful deployment of a G4 MitraClip  NTW device with a reduction of mitral regurgitation from severe to mild  DIAGNOSTIC RECOMMENDATIONS: Keep right leg straight for 4 hours following  removal of sheath  Remove mattress suture in 24 hours  Continue aspirin 81mg daily  Continue clopidogrel 75mg daily  Recommend prophylactic antibiotics prior to any procedure  Continue aggressive medical management  A repeat TTEwill be performed prior to discharge  INTERVENTIONAL IMPRESSIONS: Status post successful deployment of a G4  MitraClip NTW device with a reduction of mitral regurgitation from severe  to mild  INTERVENTIONAL RECOMMENDATIONS: Keep right leg straight for 4 hours  following removal of sheath  Remove mattress suture in 24 hours  Continue aspirin 81mg daily  Continue clopidogrel 75mg daily  Recommend prophylactic antibiotics prior to any procedure  Continue aggressive medical management  A repeat TTE will be performed prior to discharge  Prepared and signed by      Imaging:    CXR: Personally reviewed    Labs: Personally reviewed                        13.8   12.93 )-----------( 133      ( 22 Mar 2021 13:06 )             40.8     03-22    137  |  105  |  15  ----------------------------<  112<H>  4.1   |  20<L>  |  0.84    Ca    8.8      22 Mar 2021 13:06  Phos  3.3     03-22  Mg     1.9     03-22    TPro  6.2  /  Alb  4.0  /  TBili  1.3<H>  /  DBili  x   /  AST  32  /  ALT  20  /  AlkPhos  51  03-22    PT/INR - ( 22 Mar 2021 13:06 )   PT: 14.1 sec;   INR: 1.18 ratio         PTT - ( 22 Mar 2021 13:06 )  PTT:27.1 sec

## 2021-03-22 NOTE — PROGRESS NOTE ADULT - SUBJECTIVE AND OBJECTIVE BOX
This patient has been implanted with a  Mitral Edge to edge repair under the following NCT/ZAYDA:        MitraClip:  Commercial Implant NCT [id number], and will be placed into the TVT Registry      Tricuspid Clip:  [employer] Triluminate Clinical Trial  NCT [id number]    HERBERT Bolaños  [id number]

## 2021-03-22 NOTE — CONSULT NOTE ADULT - PROBLEM SELECTOR RECOMMENDATION 9
-baseline HFrEF w/ EF 25%, LVEDD 6cm  -currently hypovolemic   -would encourage PO intake  -hold Entresto for now that pt is on phenylephrine post-procedure; will f/u AM labs and consider reinitation at this time  -hold hold spironolactone 12.5mg PO qOd  -hold home Toprol XL 75mg PO BID; likely resume inpatient once pressors weaned off  -encourage PO intake given hypovolemia -baseline HFrEF w/ EF 25%, LVEDD 6cm  -currently hypovolemic   -hold Entresto for now that pt is on phenylephrine post-procedure; will f/u AM labs and consider reinitation at this time  -hold hold spironolactone 12.5mg PO qOd  -hold home Toprol XL 75mg PO BID; likely resume inpatient once pressors weaned off  -encourage PO intake given hypovolemia -baseline HFrEF w/ EF 25%, LVEDD 6cm  -currently hypovolemic   -hold Entresto for now that pt is on phenylephrine post-procedure; will f/u AM labs and consider reinitation at this time  -hold spironolactone 12.5mg PO qOd  -hold home Toprol XL 75mg PO BID; likely resume inpatient once pressors weaned off  -encourage PO intake given hypovolemia

## 2021-03-23 LAB
ALBUMIN SERPL ELPH-MCNC: 3.8 G/DL — SIGNIFICANT CHANGE UP (ref 3.3–5)
ALP SERPL-CCNC: 49 U/L — SIGNIFICANT CHANGE UP (ref 40–120)
ALT FLD-CCNC: 19 U/L — SIGNIFICANT CHANGE UP (ref 10–45)
ANION GAP SERPL CALC-SCNC: 11 MMOL/L — SIGNIFICANT CHANGE UP (ref 5–17)
AST SERPL-CCNC: 27 U/L — SIGNIFICANT CHANGE UP (ref 10–40)
BASOPHILS # BLD AUTO: 0.01 K/UL — SIGNIFICANT CHANGE UP (ref 0–0.2)
BASOPHILS NFR BLD AUTO: 0.1 % — SIGNIFICANT CHANGE UP (ref 0–2)
BILIRUB SERPL-MCNC: 1.1 MG/DL — SIGNIFICANT CHANGE UP (ref 0.2–1.2)
BUN SERPL-MCNC: 12 MG/DL — SIGNIFICANT CHANGE UP (ref 7–23)
CALCIUM SERPL-MCNC: 8.6 MG/DL — SIGNIFICANT CHANGE UP (ref 8.4–10.5)
CHLORIDE SERPL-SCNC: 107 MMOL/L — SIGNIFICANT CHANGE UP (ref 96–108)
CO2 SERPL-SCNC: 22 MMOL/L — SIGNIFICANT CHANGE UP (ref 22–31)
CREAT SERPL-MCNC: 0.79 MG/DL — SIGNIFICANT CHANGE UP (ref 0.5–1.3)
EOSINOPHIL # BLD AUTO: 0.09 K/UL — SIGNIFICANT CHANGE UP (ref 0–0.5)
EOSINOPHIL NFR BLD AUTO: 1.3 % — SIGNIFICANT CHANGE UP (ref 0–6)
GAS PNL BLDA: SIGNIFICANT CHANGE UP
GLUCOSE BLDC GLUCOMTR-MCNC: 104 MG/DL — HIGH (ref 70–99)
GLUCOSE BLDC GLUCOMTR-MCNC: 123 MG/DL — HIGH (ref 70–99)
GLUCOSE BLDC GLUCOMTR-MCNC: 137 MG/DL — HIGH (ref 70–99)
GLUCOSE BLDC GLUCOMTR-MCNC: 142 MG/DL — HIGH (ref 70–99)
GLUCOSE BLDC GLUCOMTR-MCNC: 171 MG/DL — HIGH (ref 70–99)
GLUCOSE SERPL-MCNC: 94 MG/DL — SIGNIFICANT CHANGE UP (ref 70–99)
HCT VFR BLD CALC: 40 % — SIGNIFICANT CHANGE UP (ref 39–50)
HGB BLD-MCNC: 13.1 G/DL — SIGNIFICANT CHANGE UP (ref 13–17)
IMM GRANULOCYTES NFR BLD AUTO: 0.3 % — SIGNIFICANT CHANGE UP (ref 0–1.5)
LYMPHOCYTES # BLD AUTO: 1.64 K/UL — SIGNIFICANT CHANGE UP (ref 1–3.3)
LYMPHOCYTES # BLD AUTO: 23.6 % — SIGNIFICANT CHANGE UP (ref 13–44)
MAGNESIUM SERPL-MCNC: 2.1 MG/DL — SIGNIFICANT CHANGE UP (ref 1.6–2.6)
MCHC RBC-ENTMCNC: 30.3 PG — SIGNIFICANT CHANGE UP (ref 27–34)
MCHC RBC-ENTMCNC: 32.8 GM/DL — SIGNIFICANT CHANGE UP (ref 32–36)
MCV RBC AUTO: 92.6 FL — SIGNIFICANT CHANGE UP (ref 80–100)
MONOCYTES # BLD AUTO: 0.9 K/UL — SIGNIFICANT CHANGE UP (ref 0–0.9)
MONOCYTES NFR BLD AUTO: 12.9 % — SIGNIFICANT CHANGE UP (ref 2–14)
NEUTROPHILS # BLD AUTO: 4.3 K/UL — SIGNIFICANT CHANGE UP (ref 1.8–7.4)
NEUTROPHILS NFR BLD AUTO: 61.8 % — SIGNIFICANT CHANGE UP (ref 43–77)
NRBC # BLD: 0 /100 WBCS — SIGNIFICANT CHANGE UP (ref 0–0)
NT-PROBNP SERPL-SCNC: 487 PG/ML — HIGH (ref 0–300)
PHOSPHATE SERPL-MCNC: 2.5 MG/DL — SIGNIFICANT CHANGE UP (ref 2.5–4.5)
PLATELET # BLD AUTO: 122 K/UL — LOW (ref 150–400)
POTASSIUM SERPL-MCNC: 3.8 MMOL/L — SIGNIFICANT CHANGE UP (ref 3.5–5.3)
POTASSIUM SERPL-SCNC: 3.8 MMOL/L — SIGNIFICANT CHANGE UP (ref 3.5–5.3)
PROT SERPL-MCNC: 5.9 G/DL — LOW (ref 6–8.3)
RBC # BLD: 4.32 M/UL — SIGNIFICANT CHANGE UP (ref 4.2–5.8)
RBC # FLD: 13.2 % — SIGNIFICANT CHANGE UP (ref 10.3–14.5)
SODIUM SERPL-SCNC: 140 MMOL/L — SIGNIFICANT CHANGE UP (ref 135–145)
WBC # BLD: 6.96 K/UL — SIGNIFICANT CHANGE UP (ref 3.8–10.5)
WBC # FLD AUTO: 6.96 K/UL — SIGNIFICANT CHANGE UP (ref 3.8–10.5)

## 2021-03-23 PROCEDURE — 71045 X-RAY EXAM CHEST 1 VIEW: CPT | Mod: 26

## 2021-03-23 PROCEDURE — 99291 CRITICAL CARE FIRST HOUR: CPT

## 2021-03-23 PROCEDURE — 93283 PRGRMG EVAL IMPLANTABLE DFB: CPT | Mod: 26

## 2021-03-23 PROCEDURE — 99292 CRITICAL CARE ADDL 30 MIN: CPT | Mod: 25

## 2021-03-23 PROCEDURE — 99233 SBSQ HOSP IP/OBS HIGH 50: CPT

## 2021-03-23 PROCEDURE — 93306 TTE W/DOPPLER COMPLETE: CPT | Mod: 26

## 2021-03-23 PROCEDURE — 93010 ELECTROCARDIOGRAM REPORT: CPT

## 2021-03-23 PROCEDURE — 99291 CRITICAL CARE FIRST HOUR: CPT | Mod: 25

## 2021-03-23 RX ORDER — ALBUMIN HUMAN 25 %
250 VIAL (ML) INTRAVENOUS ONCE
Refills: 0 | Status: COMPLETED | OUTPATIENT
Start: 2021-03-23 | End: 2021-03-23

## 2021-03-23 RX ORDER — POTASSIUM CHLORIDE 20 MEQ
20 PACKET (EA) ORAL
Refills: 0 | Status: COMPLETED | OUTPATIENT
Start: 2021-03-23 | End: 2021-03-23

## 2021-03-23 RX ORDER — SODIUM CHLORIDE 9 MG/ML
1000 INJECTION INTRAMUSCULAR; INTRAVENOUS; SUBCUTANEOUS
Refills: 0 | Status: DISCONTINUED | OUTPATIENT
Start: 2021-03-23 | End: 2021-03-24

## 2021-03-23 RX ADMIN — Medication 1: at 08:21

## 2021-03-23 RX ADMIN — PANTOPRAZOLE SODIUM 40 MILLIGRAM(S): 20 TABLET, DELAYED RELEASE ORAL at 06:39

## 2021-03-23 RX ADMIN — POLYETHYLENE GLYCOL 3350 17 GRAM(S): 17 POWDER, FOR SOLUTION ORAL at 12:52

## 2021-03-23 RX ADMIN — ATORVASTATIN CALCIUM 80 MILLIGRAM(S): 80 TABLET, FILM COATED ORAL at 21:12

## 2021-03-23 RX ADMIN — Medication 81 MILLIGRAM(S): at 12:51

## 2021-03-23 RX ADMIN — SODIUM CHLORIDE 75 MILLILITER(S): 9 INJECTION INTRAMUSCULAR; INTRAVENOUS; SUBCUTANEOUS at 21:12

## 2021-03-23 RX ADMIN — SODIUM CHLORIDE 75 MILLILITER(S): 9 INJECTION INTRAMUSCULAR; INTRAVENOUS; SUBCUTANEOUS at 12:28

## 2021-03-23 RX ADMIN — Medication 125 MILLILITER(S): at 07:39

## 2021-03-23 RX ADMIN — Medication 20 MILLIEQUIVALENT(S): at 04:37

## 2021-03-23 RX ADMIN — Medication 100 MILLIGRAM(S): at 05:30

## 2021-03-23 RX ADMIN — Medication 125 MILLILITER(S): at 12:54

## 2021-03-23 RX ADMIN — Medication 20 MILLIEQUIVALENT(S): at 02:14

## 2021-03-23 RX ADMIN — CLOPIDOGREL BISULFATE 75 MILLIGRAM(S): 75 TABLET, FILM COATED ORAL at 12:51

## 2021-03-23 NOTE — PROGRESS NOTE ADULT - SUBJECTIVE AND OBJECTIVE BOX
HOME RAMIREZ  MRN-[id number]  Patient is a 61y old  Male who presents with a chief complaint of severe MR/ Mitraclip (23 Mar 2021 11:36)    HPI:  60 YO M PMH CAD s/p Impella assisted PCI of pLAD (x1), Chronic systolic HF (EF 27% in 10/2020) NYHA Class II (on Entresto/Spirolactone/Metoprolol), moderate-severe MR, T2DM, c/o increasing fatigue & worsening ARAIZA x4 months, presents to [facility] for MITRACLIP 3/22/2021. Denies any palpitations, chest pain, peripheral edema, N/V, Covid symptoms (fever, chills, cough) or exposure.     Surgery/Hospital course:  3/22 Mitraclip     Today/Overnight:    Vital Signs Last 24 Hrs  T(C): 36.7 (23 Mar 2021 20:00), Max: 36.7 (23 Mar 2021 20:00)  T(F): 98 (23 Mar 2021 20:00), Max: 98 (23 Mar 2021 20:00)  HR: 70 (23 Mar 2021 20:00) (59 - 77)  BP: 95/56 (23 Mar 2021 09:45) (85/53 - 98/57)  BP(mean): 71 (23 Mar 2021 09:45) (65 - 71)  RR: 13 (23 Mar 2021 20:00) (8 - 33)  SpO2: 100% (23 Mar 2021 20:00) (95% - 100%)  ============================I/O===========================  I&O's Detail    22 Mar 2021 07:01  -  23 Mar 2021 07:00  --------------------------------------------------------  IN:    IV PiggyBack: 200 mL    Oral Fluid: 740 mL    Phenylephrine: 202.1 mL  Total IN: 1142.1 mL    OUT:    Voided (mL): 1410 mL  Total OUT: 1410 mL    Total NET: -267.9 mL      23 Mar 2021 07:01  -  23 Mar 2021 20:57  --------------------------------------------------------  IN:    Albumin 5%  - 250 mL: 500 mL    Oral Fluid: 250 mL    Phenylephrine: 25 mL    sodium chloride 0.9%: 825 mL  Total IN: 1600 mL    OUT:    Voided (mL): 2450 mL  Total OUT: 2450 mL    Total NET: -850 mL        ============================ LABS =========================                        13.1   6.96  )-----------( 122      ( 23 Mar 2021 00:38 )             40.0     03-23    140  |  107  |  12  ----------------------------<  94  3.8   |  22  |  0.79    Ca    8.6      23 Mar 2021 00:38  Phos  2.5     03-23  Mg     2.1     03-23    TPro  5.9<L>  /  Alb  3.8  /  TBili  1.1  /  DBili  x   /  AST  27  /  ALT  19  /  AlkPhos  49  03-23    LIVER FUNCTIONS - ( 23 Mar 2021 00:38 )  Alb: 3.8 g/dL / Pro: 5.9 g/dL / ALK PHOS: 49 U/L / ALT: 19 U/L / AST: 27 U/L / GGT: x           PT/INR - ( 22 Mar 2021 13:06 )   PT: 14.1 sec;   INR: 1.18 ratio         PTT - ( 22 Mar 2021 13:06 )  PTT:27.1 sec  ABG - ( 23 Mar 2021 00:19 )  pH, Arterial: 7.41  pH, Blood: x     /  pCO2: 39    /  pO2: 105   / HCO3: 24    / Base Excess: .3    /  SaO2: 98          ======================Micro/Rad/Cardio=================  Telemetry: Reviewed   CXR: Reviewed  Echo: Reviewed  ======================================================  PAST MEDICAL & SURGICAL HISTORY:  Chronic systolic heart failure    Type 2 diabetes mellitus    Nonrheumatic mitral valve regurgitation    Stented coronary artery    Coronary artery disease due to lipid rich plaque    Acute myocardial infarction    Ischemic cardiomyopathy with implantable cardioverter-defibrillator (ICD)    Hyperlipidemia    Cardiomyopathy    History of implantable cardioverter-defibrillator (ICD) insertion  Aeropostale implanted 4/2019    ========================ASSESSMENT ================  S/P Mitraclip on 3/22  Hypotension  PPM  DMT2 w/ Hyperglycemia  Thrombocytopenia    Plan:  ====================== NEUROLOGY=====================  Nonfocal, continue to monitor neuro status per ICU protocol.   Tylenol PRN for analgesia    acetaminophen   Tablet .. 650 milliGRAM(s) Oral every 6 hours PRN Mild Pain (1 - 3)  ==================== RESPIRATORY======================  Comfortable on room air, SpO2 100%  Continue to monitor SpO2 via pulse oximetry  Encourage bedside spirometry     ====================CARDIOVASCULAR==================  S/P Mitraclip on 3/22. PPM in place.  Continue pressor support with IV Phenylephrine  C/w DAPT: ASA and Plavix with Lipitor for hx of CAD    phenylephrine    Infusion 0.5 MICROgram(s)/kG/Min (15.8 mL/Hr) IV Continuous <Continuous>  aspirin  chewable 81 milliGRAM(s) Oral daily  clopidogrel Tablet 75 milliGRAM(s) Oral daily  atorvastatin 80 milliGRAM(s) Oral at bedtime  ===================HEMATOLOGIC/ONC ===================  H/H stable. Thrombocytopenia, PLTs 122k.  Trending hemoglobin and hematocrit levels, platelets and coagulation parameters.     ===================== RENAL =========================  Continue to monitor I/Os, BUN/Creatinine, and urine output.   Goal net negative fluid balance. Replete lytes PRN. Keep K> 4 and Mg >2.     ==================== GASTROINTESTINAL===================  Tolerating PO consistent carb diet.   Continue Protonix for stress ulcer prophylaxis.   Bowel regimen with Miralax.     pantoprazole    Tablet 40 milliGRAM(s) Oral before breakfast  polyethylene glycol 3350 17 Gram(s) Oral daily  sodium chloride 0.9%. 1000 milliLiter(s) (75 mL/Hr) IV Continuous <Continuous>  =======================    ENDOCRINE  =====================  Hx of DMT2, glycemic control with Admelog sliding scale.   Monitor blood glucose levels.     insulin lispro (ADMELOG) corrective regimen sliding scale   SubCutaneous Before meals and at bedtime  ========================INFECTIOUS DISEASE================  Afebrile, WBC within normal limits.   Monitor temperature and trend WBC. Monitor off abx.      Patient requires continuous monitoring with bedside rhythm monitoring, pulse oximetry monitoring, and continuous central venous and arterial pressure monitoring; and intermittent blood gas analysis.  Care plan discussed with ICU care team.    Patient remained critical, at risk for life threatening decompensation.   I have spent 35 minutes providing acute care with multiple re-evaluations throughout the evening.     By signing my name below, I, Desiree Ordaz, attest that this documentation has been prepared under the direction and in the presence of Ngoc Fisher NP.  Electronically signed: Lowell Concepcion, 03-23-21 @ 20:57    I, Ngoc Fisher NP, personally performed the services described in this documentation. All medical record entries made by the scribe were at my direction and in my presence. I have reviewed the chart and agree that the record reflects my personal performance and is accurate and complete  Electronically signed: Ngoc Fisher NP, 03-23-21 @ 20:57       HOME RAMIREZ  MRN-[id number]  Patient is a 61y old  Male who presents with a chief complaint of severe MR/ Mitraclip (23 Mar 2021 11:36)    HPI:  62 YO M PMH CAD s/p Impella assisted PCI of pLAD (x1), Chronic systolic HF (EF 27% in 10/2020) NYHA Class II (on Entresto/Spirolactone/Metoprolol), moderate-severe MR, T2DM, c/o increasing fatigue & worsening ARAIZA x4 months, presents to [facility] for MITRACLIP 3/22/2021. Denies any palpitations, chest pain, peripheral edema, N/V, Covid symptoms (fever, chills, cough) or exposure.     Surgery/Hospital course:  3/22 Mitraclip     Today/Overnight:  ·	Gave fluids & albumin for low BPs this afternoon.  ·	Increased AICD pacing to 72.  ·	TTE today: EF 20%. Mild-moderate mitral regurgitation. Severe global left ventricular systolic dysfunction with regional variation.    Vital Signs Last 24 Hrs  T(C): 36.7 (23 Mar 2021 20:00), Max: 36.7 (23 Mar 2021 20:00)  T(F): 98 (23 Mar 2021 20:00), Max: 98 (23 Mar 2021 20:00)  HR: 70 (23 Mar 2021 20:00) (59 - 77)  BP: 95/56 (23 Mar 2021 09:45) (85/53 - 98/57)  BP(mean): 71 (23 Mar 2021 09:45) (65 - 71)  RR: 13 (23 Mar 2021 20:00) (8 - 33)  SpO2: 100% (23 Mar 2021 20:00) (95% - 100%)  ============================I/O===========================  I&O's Detail    22 Mar 2021 07:01  -  23 Mar 2021 07:00  --------------------------------------------------------  IN:    IV PiggyBack: 200 mL    Oral Fluid: 740 mL    Phenylephrine: 202.1 mL  Total IN: 1142.1 mL    OUT:    Voided (mL): 1410 mL  Total OUT: 1410 mL    Total NET: -267.9 mL      23 Mar 2021 07:01  -  23 Mar 2021 20:57  --------------------------------------------------------  IN:    Albumin 5%  - 250 mL: 500 mL    Oral Fluid: 250 mL    Phenylephrine: 25 mL    sodium chloride 0.9%: 825 mL  Total IN: 1600 mL    OUT:    Voided (mL): 2450 mL  Total OUT: 2450 mL    Total NET: -850 mL        ============================ LABS =========================                        13.1   6.96  )-----------( 122      ( 23 Mar 2021 00:38 )             40.0     03-23    140  |  107  |  12  ----------------------------<  94  3.8   |  22  |  0.79    Ca    8.6      23 Mar 2021 00:38  Phos  2.5     03-23  Mg     2.1     03-23    TPro  5.9<L>  /  Alb  3.8  /  TBili  1.1  /  DBili  x   /  AST  27  /  ALT  19  /  AlkPhos  49  03-23    LIVER FUNCTIONS - ( 23 Mar 2021 00:38 )  Alb: 3.8 g/dL / Pro: 5.9 g/dL / ALK PHOS: 49 U/L / ALT: 19 U/L / AST: 27 U/L / GGT: x           PT/INR - ( 22 Mar 2021 13:06 )   PT: 14.1 sec;   INR: 1.18 ratio         PTT - ( 22 Mar 2021 13:06 )  PTT:27.1 sec  ABG - ( 23 Mar 2021 00:19 )  pH, Arterial: 7.41  pH, Blood: x     /  pCO2: 39    /  pO2: 105   / HCO3: 24    / Base Excess: .3    /  SaO2: 98          ======================Micro/Rad/Cardio=================  Telemetry: Reviewed   CXR: Reviewed  Echo: Reviewed  ======================================================  PAST MEDICAL & SURGICAL HISTORY:  Chronic systolic heart failure    Type 2 diabetes mellitus    Nonrheumatic mitral valve regurgitation    Stented coronary artery    Coronary artery disease due to lipid rich plaque    Acute myocardial infarction    Ischemic cardiomyopathy with implantable cardioverter-defibrillator (ICD)    Hyperlipidemia    Cardiomyopathy    History of implantable cardioverter-defibrillator (ICD) insertion  [employer] implanted 4/2019    ========================ASSESSMENT ================  S/P Mitraclip on 3/22  Hypotension  PPM  DMT2 w/ Hyperglycemia  Thrombocytopenia    Plan:  ====================== NEUROLOGY=====================  Nonfocal, continue to monitor neuro status per ICU protocol.   Tylenol PRN for analgesia    acetaminophen   Tablet .. 650 milliGRAM(s) Oral every 6 hours PRN Mild Pain (1 - 3)  ==================== RESPIRATORY======================  Comfortable on room air, SpO2 100%  Continue to monitor SpO2 via pulse oximetry  Encourage bedside spirometry     ====================CARDIOVASCULAR==================  S/P Mitraclip on 3/22. PPM in place. Increased pacing to 72.  TTE today: EF 20%. Mild-moderate mitral regurgitation. Severe global left ventricular systolic dysfunction with regional variation.  Continue pressor support with IV Phenylephrine  C/w DAPT: ASA and Plavix with Lipitor for hx of CAD    phenylephrine    Infusion 0.5 MICROgram(s)/kG/Min (15.8 mL/Hr) IV Continuous <Continuous>  aspirin  chewable 81 milliGRAM(s) Oral daily  clopidogrel Tablet 75 milliGRAM(s) Oral daily  atorvastatin 80 milliGRAM(s) Oral at bedtime  ===================HEMATOLOGIC/ONC ===================  H/H stable. Thrombocytopenia, PLTs 122k.  Trending hemoglobin and hematocrit levels, platelets and coagulation parameters.     ===================== RENAL =========================  Continue to monitor I/Os, BUN/Creatinine, and urine output.   Goal net negative fluid balance. Replete lytes PRN. Keep K> 4 and Mg >2.     ==================== GASTROINTESTINAL===================  Tolerating PO consistent carb diet.   Continue Protonix for stress ulcer prophylaxis.   Bowel regimen with Miralax.     pantoprazole    Tablet 40 milliGRAM(s) Oral before breakfast  polyethylene glycol 3350 17 Gram(s) Oral daily  sodium chloride 0.9%. 1000 milliLiter(s) (75 mL/Hr) IV Continuous <Continuous>  =======================    ENDOCRINE  =====================  Hx of DMT2, glycemic control with Admelog sliding scale.   Monitor blood glucose levels.     insulin lispro (ADMELOG) corrective regimen sliding scale   SubCutaneous Before meals and at bedtime  ========================INFECTIOUS DISEASE================  Afebrile, WBC within normal limits.   Monitor temperature and trend WBC. Monitor off abx.      Patient requires continuous monitoring with bedside rhythm monitoring, pulse oximetry monitoring, and continuous central venous and arterial pressure monitoring; and intermittent blood gas analysis.  Care plan discussed with ICU care team.    Patient remained critical, at risk for life threatening decompensation.   I have spent 35 minutes providing acute care with multiple re-evaluations throughout the evening.     By signing my name below, I, Desiree Ordaz, attest that this documentation has been prepared under the direction and in the presence of Ngoc Fisher NP.  Electronically signed: Lowell Concepcion, 03-23-21 @ 20:57    I, Ngoc Fisher NP, personally performed the services described in this documentation. All medical record entries made by the scribe were at my direction and in my presence. I have reviewed the chart and agree that the record reflects my personal performance and is accurate and complete  Electronically signed: Ngoc Fisher NP, 03-23-21 @ 20:57

## 2021-03-23 NOTE — PROGRESS NOTE ADULT - SUBJECTIVE AND OBJECTIVE BOX
Structural Heart Team    Mr Jefferson has no complaints and says he is "feeling fine".  He denies chest pain/pressure, sob and dizziness as well as groin pain.  He has been OOBTC and there were no acute events overnight.     REVIEW OF SYSTEMS:    CONSTITUTIONAL: No weakness, fevers or chills  EYES/ENT: No visual changes;  No vertigo or throat pain   NECK: No pain or stiffness  RESPIRATORY: No cough, wheezing, hemoptysis; No shortness of breath  CARDIOVASCULAR: No chest pain or palpitations  GASTROINTESTINAL: No abdominal or epigastric pain. No nausea, vomiting, or hematemesis; No diarrhea or constipation. No melena or hematochezia.  GENITOURINARY: No dysuria, frequency or hematuria  NEUROLOGICAL: No numbness or weakness  SKIN: No itching, rashes      Allergies    No Known Allergies    Intolerances      Vital Signs Last 24 Hrs  T(C): 36.6 (23 Mar 2021 08:00), Max: 36.6 (22 Mar 2021 12:45)  T(F): 97.9 (23 Mar 2021 08:00), Max: 97.9 (22 Mar 2021 16:00)  HR: 70 (23 Mar 2021 11:00) (59 - 70)  BP: 98/57 (23 Mar 2021 05:00) (98/57 - 105/59)  BP(mean): 70 (23 Mar 2021 05:00) (70 - 79)  RR: 13 (23 Mar 2021 11:00) (8 - 28)  SpO2: 99% (23 Mar 2021 11:00) (94% - 100%)    MEDICATIONS  (STANDING):  albumin human  5% IVPB 250 milliLiter(s) IV Intermittent once  aspirin  chewable 81 milliGRAM(s) Oral daily  atorvastatin 80 milliGRAM(s) Oral at bedtime  clopidogrel Tablet 75 milliGRAM(s) Oral daily  insulin lispro (ADMELOG) corrective regimen sliding scale   SubCutaneous Before meals and at bedtime  pantoprazole    Tablet 40 milliGRAM(s) Oral before breakfast  phenylephrine    Infusion 0.5 MICROgram(s)/kG/Min (15.8 mL/Hr) IV Continuous <Continuous>  polyethylene glycol 3350 17 Gram(s) Oral daily  sodium chloride 0.9%. 1000 milliLiter(s) (75 mL/Hr) IV Continuous <Continuous>      Exam-  General: NAD, WDWN, appropriate affect  Cor: s1s2, RRR, no murmurs   Pulm: Clear, no wheezes, rales, or rhonchi, no use of accessory muscles  Gastrointestinal: soft, nontender, nondistended, +bowel sounds  Extremities: no edema, 2+ DP/PT pulses  Groin: dressing intact, nontender, clean and dry, soft, no hematoma b/l  Neuro: A&Ox3, nonfocal                          13.1   6.96  )-----------( 122      ( 23 Mar 2021 00:38 )             40.0   03-23    140  |  107  |  12  ----------------------------<  94  3.8   |  22  |  0.79    Ca    8.6      23 Mar 2021 00:38  Phos  2.5     03-23  Mg     2.1     03-23    TPro  5.9<L>  /  Alb  3.8  /  TBili  1.1  /  DBili  x   /  AST  27  /  ALT  19  /  AlkPhos  49  03-23  PT/INR - ( 22 Mar 2021 13:06 )   PT: 14.1 sec;   INR: 1.18 ratio         PTT - ( 22 Mar 2021 13:06 )  PTT:27.1 sec  I&O's Summary    22 Mar 2021 07:01  -  23 Mar 2021 07:00  --------------------------------------------------------  IN: 1142.1 mL / OUT: 1410 mL / NET: -267.9 mL    23 Mar 2021 07:01  -  23 Mar 2021 11:37  --------------------------------------------------------  IN: 850 mL / OUT: 700 mL / NET: 150 mL      < from: Transthoracic Echocardiogram (03.23.21 @ 09:06) >  Dimensions:    Normal Values:  LA:     4.0    2.0 - 4.0 cm  Ao:     3.5    2.0 - 3.8 cm  SEPTUM: 0.6    0.6 - 1.2 cm  PWT:    0.6    0.6 - 1.1 cm  LVIDd:  6.6    3.0 - 5.6 cm  LVIDs:  5.9    1.8 - 4.0 cm  Derived variables:  LVMI: 77 g/m2  RWT: 0.18  Fractional short: 11 %  EF (Visual Estimate): 20 %  ------------------------------------------------------------------------  Observations:  Mitral Valve: Patient status-post MitraClip placement.  MitraClip appears well seated. Mild-moderate mitral  regurgitation. Mean transmitral valve gradient equals 2-3  mm Hg. (HRabout 60 bpm)  Aortic Valve/Aorta: Normal trileaflet aortic valve. No  aortic valve regurgitation seen. Peak left ventricular  outflow tract gradient equals 5 mm Hg, mean gradient is  equal to 3 mm Hg, LVOT velocity time integral equals 23 cm.  Aortic Root: 3.5 cm.  Left Atrium: Normal left atrium.  LA volume index = 25  cc/m2.  Left Ventricle: Severe global left ventricular systolic  dysfunction with regional variation. Severe left  ventricular enlargement.  Right Heart: Normal right atrium. Normal right ventricular  size and function. A device wire is noted in the right  heart. Tricuspid valve not well visualized, probably  normal. Minimal tricuspid regurgitation.  Pericardium/Pleura: Normal pericardium with no pericardial  effusion.  Hemodynamic: Estimated right atrial pressure is 8 mm Hg.  ------------------------------------------------------------------------  Conclusions:  1. Patient status-post MitraClip placement. MitraClip  appears well seated. Mild-moderate mitral regurgitation.  Mean transmitral valve gradient equals 2-3 mm Hg. (HRabout  60 bpm)  2. Normal trileaflet aortic valve. No aortic valve  regurgitation seen.  3. Severe global left ventricular systolic dysfunction with  regional variation.  4. Normal right ventricular size and function. A device  wire is noted in the rightheart.  *** Compared with echocardiogram of 3/22/2021, results are  similar to post-procedure findings.    < end of copied text >          Assessment/Plan:  Mr Jefferson is a 62y/o male POD 1 s/p Mitraclip for severe MR  - on asa/plavix  - wean phenylephrine  - intraop Clip MR reduction from severe to mild  -- post Clip TTE mild/moderate  - ambulate as tolerated     - Discharge plan: follow up with Dr. Jarquin in one week and follow up with Structural Heart Team in one month.  Echo will be done at 1 month follow up visit.  Plan discussed with patient     HERBERT Ambriz  [phone]

## 2021-03-23 NOTE — OCCUPATIONAL THERAPY INITIAL EVALUATION ADULT - LIVES WITH, PROFILE
pt lives in a house with his wife and two daughters (27 & 23), +3 steps to enter. PTA IND with ADL/IADL +driving.

## 2021-03-23 NOTE — OCCUPATIONAL THERAPY INITIAL EVALUATION ADULT - PERTINENT HX OF CURRENT PROBLEM, REHAB EVAL
60M hx of ACC/AHA Stage C HF with NYHA Class I-II symptoms, ICM / HFrEF EF 25% s/p ICD 4/19, CAD s/p PCI 11/2018 to pLAD w/ residual  RCA/LCx, severe MR and DM2 here for elective mitral clip.

## 2021-03-23 NOTE — PROGRESS NOTE ADULT - SUBJECTIVE AND OBJECTIVE BOX
Patient seen and examined at bedside.    Overnight Events:  s/p mitral clip yesterday, feels well.  notably orthostatic this AM.  No complaints.    Review Of Systems: No chest pain, shortness of breath, or palpitations            Current Meds:  acetaminophen   Tablet .. 650 milliGRAM(s) Oral every 6 hours PRN  albumin human  5% IVPB 250 milliLiter(s) IV Intermittent once  aspirin  chewable 81 milliGRAM(s) Oral daily  atorvastatin 80 milliGRAM(s) Oral at bedtime  clopidogrel Tablet 75 milliGRAM(s) Oral daily  insulin lispro (ADMELOG) corrective regimen sliding scale   SubCutaneous Before meals and at bedtime  pantoprazole    Tablet 40 milliGRAM(s) Oral before breakfast  phenylephrine    Infusion 0.5 MICROgram(s)/kG/Min IV Continuous <Continuous>  polyethylene glycol 3350 17 Gram(s) Oral daily  sodium chloride 0.9%. 1000 milliLiter(s) IV Continuous <Continuous>      Vitals:  T(F): 97.9 (03-23), Max: 97.9 (03-22)  HR: 70 (03-23) (59 - 70)  BP: 98/57 (03-23) (98/57 - 105/59)  RR: 13 (03-23)  SpO2: 99% (03-23)  I&O's Summary    22 Mar 2021 07:01  -  23 Mar 2021 07:00  --------------------------------------------------------  IN: 1142.1 mL / OUT: 1410 mL / NET: -267.9 mL    23 Mar 2021 07:01  -  23 Mar 2021 12:07  --------------------------------------------------------  IN: 850 mL / OUT: 700 mL / NET: 150 mL        Physical Exam:  Appearance: No acute distress; well appearing  Eyes: PERRL, EOMI, pink conjunctiva  HEENT: Normal oral mucosa  Cardiovascular: RRR, S1, S2, 1/6 holosystolic murmur, no rubs, or gallops; no edema; no JVD  Respiratory: Clear to auscultation bilaterally  Gastrointestinal: soft, non-tender, non-distended with normal bowel sounds  Musculoskeletal: No clubbing; no joint deformity   Neurologic: Non-focal  Lymphatic: No lymphadenopathy  Psychiatry: AAOx3, mood & affect appropriate  Skin: No rashes, ecchymoses, or cyanosis                          13.1   6.96  )-----------( 122      ( 23 Mar 2021 00:38 )             40.0     03-23    140  |  107  |  12  ----------------------------<  94  3.8   |  22  |  0.79    Ca    8.6      23 Mar 2021 00:38  Phos  2.5     03-23  Mg     2.1     03-23    TPro  5.9<L>  /  Alb  3.8  /  TBili  1.1  /  DBili  x   /  AST  27  /  ALT  19  /  AlkPhos  49  03-23    PT/INR - ( 22 Mar 2021 13:06 )   PT: 14.1 sec;   INR: 1.18 ratio         PTT - ( 22 Mar 2021 13:06 )  PTT:27.1 sec              New ECG(s): Personally reviewed    Echo: 3/23/21  Observations:  Mitral Valve: Patient status-post MitraClip placement.  MitraClip appears well seated. Mild-moderate mitral  regurgitation. Mean transmitral valve gradient equals 2-3  mm Hg. (HRabout 60 bpm)  Aortic Valve/Aorta: Normal trileaflet aortic valve. No  aortic valve regurgitation seen. Peak left ventricular  outflow tract gradient equals 5 mm Hg, mean gradient is  equal to 3 mm Hg, LVOT velocity time integral equals 23 cm.  Aortic Root: 3.5 cm.  Left Atrium: Normal left atrium.  LA volume index = 25  cc/m2.  Left Ventricle: Severe global left ventricular systolic  dysfunction with regional variation. Severe left  ventricular enlargement.  Right Heart: Normal right atrium. Normal right ventricular  size and function. A device wire is noted in the right  heart. Tricuspid valve not well visualized, probably  normal. Minimal tricuspid regurgitation.  Pericardium/Pleura: Normal pericardium with no pericardial  effusion.  Hemodynamic: Estimated right atrial pressure is 8 mm Hg.  ------------------------------------------------------------------------  Conclusions:  1. Patient status-post MitraClip placement. MitraClip  appears well seated. Mild-moderate mitral regurgitation.  Mean transmitral valve gradient equals 2-3 mm Hg. (HRabout  60 bpm)  2. Normal trileaflet aortic valve. No aortic valve  regurgitation seen.  3. Severe global left ventricular systolic dysfunction with  regional variation.  4. Normal right ventricular size and function. A device  wire is noted in the rightheart.  *** Compared with echocardiogram of 3/22/2021, results are  similar to post-procedure findings.      Interpretation of Telemetry: intermittently A-paced 60BPM, no events

## 2021-03-23 NOTE — PROGRESS NOTE ADULT - SUBJECTIVE AND OBJECTIVE BOX
CRITICAL CARE ATTENDING - CTICU    MEDICATIONS  (STANDING):  aspirin  chewable 81 milliGRAM(s) Oral daily  atorvastatin 80 milliGRAM(s) Oral at bedtime  clopidogrel Tablet 75 milliGRAM(s) Oral daily  insulin lispro (ADMELOG) corrective regimen sliding scale   SubCutaneous Before meals and at bedtime  pantoprazole    Tablet 40 milliGRAM(s) Oral before breakfast  phenylephrine    Infusion 0.5 MICROgram(s)/kG/Min (15.8 mL/Hr) IV Continuous <Continuous>  polyethylene glycol 3350 17 Gram(s) Oral daily                                    13.1   6.96  )-----------( 122      ( 23 Mar 2021 00:38 )             40.0       03-23    140  |  107  |  12  ----------------------------<  94  3.8   |  22  |  0.79    Ca    8.6      23 Mar 2021 00:38  Phos  2.5     03-  Mg     2.1         TPro  5.9<L>  /  Alb  3.8  /  TBili  1.1  /  DBili  x   /  AST  27  /  ALT  19  /  AlkPhos  49  0323      PT/INR - ( 22 Mar 2021 13:06 )   PT: 14.1 sec;   INR: 1.18 ratio         PTT - ( 22 Mar 2021 13:06 )  PTT:27.1 sec        Daily Height in cm: 180 (22 Mar 2021 06:36)    Daily Weight in k.9 (22 Mar 2021 20:00)      - @ 07:01  -   @ 06:35  --------------------------------------------------------  IN: 1142.1 mL / OUT: 1410 mL / NET: -267.9 mL      Critically Ill patient  : [ ] preoperative ,   [x] post operative    Requires :  [x] Arterial Line   [ ] Central Line  [ ] PA catheter  [ ] IABP  [ ] ECMO  [ ] LVAD  [ ] Ventilator  [x] pacemaker - PPM [ ] Impella.                      [ x] ABG's     [x ] Pulse Oxymetry Monitoring  Bedside evaluation , monitoring , treatment of hemodynamics , fluids , IVP/ IVCD meds.        Diagnosis:     POD 1 - MV Clip    Hypotension    Hemodynamic lability,  instability. Requires IVCD [x] vasopressors [ ] inotropes  [ ] vasodilator  [x ]IVSS fluid  to maintain MAP, perfusion, C.I.     PPM    DMT2    CHF- acute [ x]   chronic [ x]    systolic [x ]   diatolic [ ]          - Echo- EF - 30%  / MR          [ ] RV dysfunction          - Cxr-cardiomegally, edema          - Clinical-  [ ]inotropes   [x]pressors   [ ]diuresis   [ ]IABP   [ ]ECMO   [ ]LVAD   [ ]Respiratory Failure    No catheters in either femoral artery or vein - Left Deployment site - no hematomas  or swelling     CAD with stents     Thrombocytopenia    Requires bedside physical therapy, mobilization and total [facility] care.         I, Reese Tavares, personally performed the services described in this documentation. All medical record entries made by the scribe were at my direction and in my presence. I have reviewed the chart and agree that the record reflects my personal performance and is accurate and complete.   Reese Tavares MD.       By signing my name below, I, Desiree Ordaz, attest that this documentation has been prepared under the direction and in the presence of Reese Tavares MD.   Electronically Signed: Lowell Concepcion 21 @ 06:35        Discussed with CT surgeon, Physician Assistant - Nurse Practitioner- Critical care medicine team.   Dicussed at  AM / PM rounds.   Chart, labs , films reviewed.    Cumulative Critical Care Time Given Today:  CRITICAL CARE ATTENDING - CTICU    MEDICATIONS  (STANDING):  aspirin  chewable 81 milliGRAM(s) Oral daily  atorvastatin 80 milliGRAM(s) Oral at bedtime  clopidogrel Tablet 75 milliGRAM(s) Oral daily  insulin lispro (ADMELOG) corrective regimen sliding scale   SubCutaneous Before meals and at bedtime  pantoprazole    Tablet 40 milliGRAM(s) Oral before breakfast  phenylephrine    Infusion 0.5 MICROgram(s)/kG/Min (15.8 mL/Hr) IV Continuous <Continuous>  polyethylene glycol 3350 17 Gram(s) Oral daily                                    13.1   6.96  )-----------( 122      ( 23 Mar 2021 00:38 )             40.0       03-23    140  |  107  |  12  ----------------------------<  94  3.8   |  22  |  0.79    Ca    8.6      23 Mar 2021 00:38  Phos  2.5     03-  Mg     2.1         TPro  5.9<L>  /  Alb  3.8  /  TBili  1.1  /  DBili  x   /  AST  27  /  ALT  19  /  AlkPhos  49  0323      PT/INR - ( 22 Mar 2021 13:06 )   PT: 14.1 sec;   INR: 1.18 ratio         PTT - ( 22 Mar 2021 13:06 )  PTT:27.1 sec        Daily Height in cm: 180 (22 Mar 2021 06:36)    Daily Weight in k.9 (22 Mar 2021 20:00)      - @ 07:01  -   @ 06:35  --------------------------------------------------------  IN: 1142.1 mL / OUT: 1410 mL / NET: -267.9 mL      Critically Ill patient  : [ ] preoperative ,   [x] post operative    Requires :  [x] Arterial Line   [ ] Central Line  [ ] PA catheter  [ ] IABP  [ ] ECMO  [ ] LVAD  [ ] Ventilator  [x] pacemaker - PPM [ ] Impella.                      [ x] ABG's     [x ] Pulse Oxymetry Monitoring  Bedside evaluation , monitoring , treatment of hemodynamics , fluids , IVP/ IVCD meds.        Diagnosis:     POD 1 - MV Clip    Hypotension    Hypovolemia    Hemodynamic lability,  instability. Requires IVCD [x] vasopressors [ ] inotropes  [ ] vasodilator  [x ]IVSS fluid  to maintain MAP, perfusion, C.I.     PPM    DMT2    CHF- acute [ x]   chronic [ x]    systolic [x ]   diatolic [ ]          - Echo- EF - 25%  / MR          [ ] RV dysfunction          - Cxr-cardiomegally, edema          - Clinical-  [ ]inotropes   [x]pressors   [ ]diuresis   [ ]IABP   [ ]ECMO   [ ]LVAD   [ ]Respiratory Failure    No catheters in either femoral artery or vein - Left Deployment site - no hematomas  or swelling     CAD with stents     Thrombocytopenia    Requires bedside physical therapy, mobilization and total [facility] care.     PPM - increase PPM rate for MAP        IReese, personally performed the services described in this documentation. All medical record entries made by the scribe were at my direction and in my presence. I have reviewed the chart and agree that the record reflects my personal performance and is accurate and complete.   Reese Tavares MD.       By signing my name below, I, Desiree Ordaz, attest that this documentation has been prepared under the direction and in the presence of Reese Tavares MD.   Electronically Signed: Lowell Concepcion 21 @ 06:35        Discussed with CT surgeon, Physician Assistant - Nurse Practitioner- Critical care medicine team.   Dicussed at  AM / PM rounds.   Chart, labs , films reviewed.    Cumulative Critical Care Time Given Today:  30 min

## 2021-03-23 NOTE — PATIENT PROFILE ADULT - NSPROPTRIGHTSUPPORTPERSON_GEN_A_NUR
Past Medical History:   Diagnosis Date    Arthritis     Diabetes     Foot fracture     Hand fracture, left     Hand fracture, right     HTN (hypertension)     Humerus fracture 07/18/2018    Hyperlipidemia     Neuropathy     Renal disorder        Past Surgical History:   Procedure Laterality Date    ANKLE SURGERY Left     CHOLECYSTECTOMY         Review of patient's allergies indicates:   Allergen Reactions    Penicillins        No current facility-administered medications on file prior to encounter.      Current Outpatient Prescriptions on File Prior to Encounter   Medication Sig    amlodipine (NORVASC) 10 MG tablet Take 10 mg by mouth once daily.    aspirin 81 MG Chew Take 81 mg by mouth once daily.    atorvastatin (LIPITOR) 20 MG tablet Take 20 mg by mouth once daily.    citalopram (CELEXA) 40 MG tablet Take 40 mg by mouth once daily.    losartan-hydrochlorothiazide 100-25 mg (HYZAAR) 100-25 mg per tablet Take 1 tablet by mouth once daily.    metformin (GLUCOPHAGE) 500 MG tablet Take 500 mg by mouth 2 (two) times daily with meals.    temazepam (RESTORIL) 30 mg capsule Take 30 mg by mouth nightly as needed for Insomnia.    docusate sodium (COLACE) 100 MG capsule Take 1 capsule (100 mg total) by mouth 2 (two) times daily as needed for Constipation.    hydrocodone-acetaminophen 10-325mg (NORCO)  mg Tab Take 1 tablet by mouth every 6 (six) hours as needed for Pain.    mupirocin (BACTROBAN) 2 % ointment Apply topically 3 (three) times daily.    polyethylene glycol (GLYCOLAX) 17 gram/dose powder Take 17 g by mouth once daily. Take daily to keep your bowel movements regular while taking pain medicines    [DISCONTINUED] meloxicam (MOBIC) 7.5 MG tablet Take 1 tablet (7.5 mg total) by mouth once daily.     Family History     Problem Relation (Age of Onset)    Heart disease Father, Paternal Grandfather    Miscarriages / Stillbirths Paternal Uncle, Paternal Grandfather        Social History Main  Topics    Smoking status: Current Every Day Smoker     Packs/day: 1.00    Smokeless tobacco: Not on file    Alcohol use Yes      Comment: 6 beers per day    Drug use: No    Sexual activity: Not on file     Review of Systems   Constitutional: Negative for chills, diaphoresis, fatigue, fever and unexpected weight change.   HENT: Negative for congestion, sore throat and trouble swallowing.         Head trauma   Eyes: Negative for visual disturbance.   Respiratory: Negative for cough, chest tightness, shortness of breath and wheezing.    Cardiovascular: Negative for chest pain, palpitations and leg swelling.   Gastrointestinal: Negative for abdominal distention, abdominal pain, blood in stool, constipation, diarrhea, nausea and vomiting.   Genitourinary: Negative for decreased urine volume, difficulty urinating, dysuria, frequency, hematuria and urgency.   Musculoskeletal: Positive for arthralgias (right shoulder). Negative for back pain, gait problem, joint swelling and myalgias.   Skin: Negative for pallor, rash and wound.   Neurological: Positive for syncope. Negative for dizziness, tremors, seizures, facial asymmetry, speech difficulty, weakness, light-headedness, numbness and headaches.   Psychiatric/Behavioral: Negative for confusion. The patient is not nervous/anxious.    All other systems reviewed and are negative.    Objective:     Vital Signs (Most Recent):  Temp: 98.8 °F (37.1 °C) (07/18/18 1940)  Pulse: 89 (07/18/18 1932)  Resp: 19 (07/18/18 1932)  BP: (!) 183/82 (07/18/18 1932)  SpO2: 96 % (07/18/18 1932) Vital Signs (24h Range):  Temp:  [98.8 °F (37.1 °C)] 98.8 °F (37.1 °C)  Pulse:  [80-90] 89  Resp:  [16-19] 19  SpO2:  [95 %-96 %] 96 %  BP: (131-186)/(64-82) 183/82     Weight: 64.9 kg (143 lb)  Body mass index is 21.12 kg/m².    Physical Exam   Constitutional: He is oriented to person, place, and time. He appears well-developed and well-nourished. No distress.   HENT:   Head: Normocephalic and  atraumatic.   Eyes: Conjunctivae are normal.   PERRL; EOM intact.   Neck: Normal range of motion. Neck supple.   Cardiovascular: Normal rate, regular rhythm, S1 normal, S2 normal and intact distal pulses.   No extrasystoles are present. Exam reveals no gallop.    No murmur heard.  Pulses:       Radial pulses are 2+ on the right side, and 2+ on the left side.        Dorsalis pedis pulses are 2+ on the right side, and 2+ on the left side.        Posterior tibial pulses are 2+ on the right side, and 2+ on the left side.   Pulmonary/Chest: Effort normal and breath sounds normal. No accessory muscle usage. No tachypnea. No respiratory distress. He has no wheezes. He has no rhonchi. He has no rales.   Abdominal: Soft. Bowel sounds are normal. He exhibits no distension. There is no tenderness. There is no rebound, no guarding and no CVA tenderness.   Musculoskeletal: He exhibits no edema or deformity.        Right shoulder: He exhibits decreased range of motion, tenderness, bony tenderness, swelling and pain. He exhibits normal pulse.   RUE in sling.  Normal sensation and pulses to RUE.   Neurological: He is alert and oriented to person, place, and time. He has normal strength. No cranial nerve deficit or sensory deficit. GCS eye subscore is 4. GCS verbal subscore is 5. GCS motor subscore is 6.   Skin: Skin is warm, dry and intact. Capillary refill takes less than 2 seconds. No rash noted. He is not diaphoretic. No cyanosis or erythema.   Psychiatric: He has a normal mood and affect. His speech is normal and behavior is normal. Cognition and memory are normal.   Nursing note and vitals reviewed.          Significant Labs:   Results for orders placed or performed during the hospital encounter of 07/18/18   CBC auto differential   Result Value Ref Range    WBC 8.09 3.90 - 12.70 K/uL    RBC 3.44 (L) 4.60 - 6.20 M/uL    Hemoglobin 12.5 (L) 14.0 - 18.0 g/dL    Hematocrit 33.7 (L) 40.0 - 54.0 %    MCV 98 82 - 98 fL    MCH 36.3  (H) 27.0 - 31.0 pg    MCHC 37.1 (H) 32.0 - 36.0 g/dL    RDW 13.5 11.5 - 14.5 %    Platelets 232 150 - 350 K/uL    MPV 7.8 (L) 9.2 - 12.9 fL    Gran # (ANC) 6.4 1.8 - 7.7 K/uL    Lymph # 0.9 (L) 1.0 - 4.8 K/uL    Mono # 0.7 0.3 - 1.0 K/uL    Eos # 0.1 0.0 - 0.5 K/uL    Baso # 0.02 0.00 - 0.20 K/uL    Gran% 79.9 (H) 38.0 - 73.0 %    Lymph% 10.9 (L) 18.0 - 48.0 %    Mono% 8.8 4.0 - 15.0 %    Eosinophil% 0.6 0.0 - 8.0 %    Basophil% 0.2 0.0 - 1.9 %    Differential Method Automated    Comprehensive metabolic panel   Result Value Ref Range    Sodium 124 (L) 136 - 145 mmol/L    Potassium 2.9 (L) 3.5 - 5.1 mmol/L    Chloride 89 (L) 95 - 110 mmol/L    CO2 18 (L) 23 - 29 mmol/L    Glucose 97 70 - 110 mg/dL    BUN, Bld 26 (H) 8 - 23 mg/dL    Creatinine 2.4 (H) 0.5 - 1.4 mg/dL    Calcium 8.6 (L) 8.7 - 10.5 mg/dL    Total Protein 5.6 (L) 6.0 - 8.4 g/dL    Albumin 2.3 (L) 3.5 - 5.2 g/dL    Total Bilirubin 0.4 0.1 - 1.0 mg/dL    Alkaline Phosphatase 88 55 - 135 U/L    AST 15 10 - 40 U/L    ALT 11 10 - 44 U/L    Anion Gap 17 (H) 8 - 16 mmol/L    eGFR if African American 31 (A) >60 mL/min/1.73 m^2    eGFR if non African American 27 (A) >60 mL/min/1.73 m^2   Brain natriuretic peptide   Result Value Ref Range     (H) 0 - 99 pg/mL   CK   Result Value Ref Range    CPK 34 20 - 200 U/L   Troponin I   Result Value Ref Range    Troponin I 0.027 (H) 0.000 - 0.026 ng/mL   Magnesium   Result Value Ref Range    Magnesium 1.7 1.6 - 2.6 mg/dL   TSH   Result Value Ref Range    TSH 1.165 0.400 - 4.000 uIU/mL   Phosphorus   Result Value Ref Range    Phosphorus 3.5 2.7 - 4.5 mg/dL   Ethanol   Result Value Ref Range    Alcohol, Medical, Serum 83 (H) <10 mg/dL      All pertinent labs within the past 24 hours have been reviewed.    Significant Imaging:   Imaging Results          US Carotid Bilateral (Final result)  Result time 07/18/18 21:04:56    Final result by Junito Kee MD (07/18/18 21:04:56)                 Impression:      Marked  atherosclerotic plaque noted bilaterally.  Findings consistent with a greater than 79% stenosis of the proximal right internal carotid artery and 50-69% stenosis involving the left internal carotid artery.      Electronically signed by: Junito Kee MD  Date:    07/18/2018  Time:    21:04             Narrative:    EXAMINATION:  US CAROTID BILATERAL    CLINICAL HISTORY:  Syncope and collapse    COMPARISON:  None    FINDINGS:  Sonographic evaluation of the carotid systems was performed.    Marked atherosclerotic plaquing noted the carotid bulb and proximal internal carotid arteries bilaterally.    The peak systolic velocity in the right internal carotid artery was approximately 409 cm/sec.    The peak systolic velocity in the left internal carotid artery was [id number] cm/sec.    Antegrade flow noted in both vertebral arteries.    Stenosis percentage validated velocity measurements with angiographic measurements, velocity criteria are extrapolated from diameter data as defined by the Society of Radiologists in Ultrasound Consensus Conference Radiology 2003; 229;340-346.                               CT Head Without Contrast (Final result)  Result time 07/18/18 18:02:30    Final result by Junito Kee MD (07/18/18 18:02:30)                 Impression:      No acute findings.    All CT scans at this facility are performed  using dose modulation techniques as appropriate to performed exam including the following:  automated exposure control; adjustment of mA and/or kV according to the patients size (this includes techniques or standardized protocols for targeted exams where dose is matched to indication/reason for exam: i.e. extremities or head);  iterative reconstruction technique.      Electronically signed by: Junito Kee MD  Date:    07/18/2018  Time:    18:02             Narrative:    EXAMINATION:  CT HEAD WITHOUT CONTRAST    CLINICAL HISTORY:  syncope;    COMPARISON:  05/16/2017.    FINDINGS:  No  intracranial hemorrhage or acute findings are demonstrated.  Mild cerebral atrophy.  Low-density changes in the periventricular matter suggests chronic small vessel ischemic change.  The visualized paranasal sinuses are clear. The calvarium is intact.  Intracranial atherosclerosis.                               X-Ray Shoulder Trauma Right (Final result)  Result time 07/18/18 17:41:47    Final result by Junito Kee MD (07/18/18 17:41:47)                 Impression:      Comminuted right humeral neck fracture with slight angulation.      Electronically signed by: Junito Kee MD  Date:    07/18/2018  Time:    17:41             Narrative:    EXAMINATION:  XR SHOULDER TRAUMA 3 VIEW RIGHT    CLINICAL HISTORY:  Pain in unspecified shoulder    TECHNIQUE:  Standard radiography performed.    COMPARISON:  None    FINDINGS:  Comminuted fracture involving the right humeral neck with slight varus angulation.  Mild arthritic change involving the AC joint.                               X-Ray Chest AP Portable (Final result)  Result time 07/18/18 17:43:02    Final result by Junito Kee MD (07/18/18 17:43:02)                 Impression:      Multiple right-sided rib fractures which may be old with scarring or atelectasis in the right lung base along with a right pleural fluid collection unknown chronicity.  This finding was not identified on previous study 06/05/2017.      Electronically signed by: Junito Kee MD  Date:    07/18/2018  Time:    17:43             Narrative:    EXAMINATION:  XR CHEST AP PORTABLE    CLINICAL HISTORY:  syncope;    COMPARISON:  06/05/2017    FINDINGS:  Scarring right lung base with multiple right-sided rib fractures.  Right pleural fluid collection may be the sequelae of prior trauma.  This was now however evident on prior study dated above.  The left lung appears clear.                               I have reviewed all pertinent imaging results/findings within the past 24 hours.     EKG: (personally  reviewed)  Normal sinus rhythm, incomplete LBBB, nonspecific T wave abnormality.  No acute ischemic ST-T abnormalities.  No previous to compare.     same name as above

## 2021-03-23 NOTE — PROCEDURE NOTE - ADDITIONAL PROCEDURE DETAILS
Indication: Increase heart rate per CT surgery   -Normals sensing and lead impedance  -Patient is not pacemaker dependent   -Changes Made: Base rate increased to 70bpm     #[id number]

## 2021-03-24 LAB
ALBUMIN SERPL ELPH-MCNC: 3.9 G/DL — SIGNIFICANT CHANGE UP (ref 3.3–5)
ALP SERPL-CCNC: 48 U/L — SIGNIFICANT CHANGE UP (ref 40–120)
ALT FLD-CCNC: 16 U/L — SIGNIFICANT CHANGE UP (ref 10–45)
ANION GAP SERPL CALC-SCNC: 10 MMOL/L — SIGNIFICANT CHANGE UP (ref 5–17)
AST SERPL-CCNC: 21 U/L — SIGNIFICANT CHANGE UP (ref 10–40)
BILIRUB SERPL-MCNC: 0.7 MG/DL — SIGNIFICANT CHANGE UP (ref 0.2–1.2)
BUN SERPL-MCNC: 14 MG/DL — SIGNIFICANT CHANGE UP (ref 7–23)
CALCIUM SERPL-MCNC: 8.6 MG/DL — SIGNIFICANT CHANGE UP (ref 8.4–10.5)
CHLORIDE SERPL-SCNC: 107 MMOL/L — SIGNIFICANT CHANGE UP (ref 96–108)
CO2 SERPL-SCNC: 23 MMOL/L — SIGNIFICANT CHANGE UP (ref 22–31)
CREAT SERPL-MCNC: 0.77 MG/DL — SIGNIFICANT CHANGE UP (ref 0.5–1.3)
GAS PNL BLDA: SIGNIFICANT CHANGE UP
GLUCOSE BLDC GLUCOMTR-MCNC: 114 MG/DL — HIGH (ref 70–99)
GLUCOSE BLDC GLUCOMTR-MCNC: 121 MG/DL — HIGH (ref 70–99)
GLUCOSE BLDC GLUCOMTR-MCNC: 187 MG/DL — HIGH (ref 70–99)
GLUCOSE BLDC GLUCOMTR-MCNC: 96 MG/DL — SIGNIFICANT CHANGE UP (ref 70–99)
GLUCOSE SERPL-MCNC: 114 MG/DL — HIGH (ref 70–99)
HCT VFR BLD CALC: 35.3 % — LOW (ref 39–50)
HGB BLD-MCNC: 11.5 G/DL — LOW (ref 13–17)
MAGNESIUM SERPL-MCNC: 2.1 MG/DL — SIGNIFICANT CHANGE UP (ref 1.6–2.6)
MCHC RBC-ENTMCNC: 30 PG — SIGNIFICANT CHANGE UP (ref 27–34)
MCHC RBC-ENTMCNC: 32.6 GM/DL — SIGNIFICANT CHANGE UP (ref 32–36)
MCV RBC AUTO: 92.2 FL — SIGNIFICANT CHANGE UP (ref 80–100)
NRBC # BLD: 0 /100 WBCS — SIGNIFICANT CHANGE UP (ref 0–0)
PHOSPHATE SERPL-MCNC: 2.5 MG/DL — SIGNIFICANT CHANGE UP (ref 2.5–4.5)
PLATELET # BLD AUTO: 87 K/UL — LOW (ref 150–400)
POTASSIUM SERPL-MCNC: 4.3 MMOL/L — SIGNIFICANT CHANGE UP (ref 3.5–5.3)
POTASSIUM SERPL-SCNC: 4.3 MMOL/L — SIGNIFICANT CHANGE UP (ref 3.5–5.3)
PROT SERPL-MCNC: 5.9 G/DL — LOW (ref 6–8.3)
RBC # BLD: 3.83 M/UL — LOW (ref 4.2–5.8)
RBC # FLD: 13.2 % — SIGNIFICANT CHANGE UP (ref 10.3–14.5)
SODIUM SERPL-SCNC: 140 MMOL/L — SIGNIFICANT CHANGE UP (ref 135–145)
WBC # BLD: 4.97 K/UL — SIGNIFICANT CHANGE UP (ref 3.8–10.5)
WBC # FLD AUTO: 4.97 K/UL — SIGNIFICANT CHANGE UP (ref 3.8–10.5)

## 2021-03-24 PROCEDURE — 99291 CRITICAL CARE FIRST HOUR: CPT

## 2021-03-24 PROCEDURE — 99233 SBSQ HOSP IP/OBS HIGH 50: CPT

## 2021-03-24 PROCEDURE — 99232 SBSQ HOSP IP/OBS MODERATE 35: CPT

## 2021-03-24 PROCEDURE — 71045 X-RAY EXAM CHEST 1 VIEW: CPT | Mod: 26

## 2021-03-24 RX ORDER — JNJ-78436735 50000000000 [PFU]/.5ML
0.5 SUSPENSION INTRAMUSCULAR ONCE
Refills: 0 | Status: COMPLETED | OUTPATIENT
Start: 2021-03-24 | End: 2021-03-24

## 2021-03-24 RX ORDER — LOSARTAN POTASSIUM 100 MG/1
25 TABLET, FILM COATED ORAL DAILY
Refills: 0 | Status: DISCONTINUED | OUTPATIENT
Start: 2021-03-24 | End: 2021-03-25

## 2021-03-24 RX ORDER — SODIUM CHLORIDE 9 MG/ML
3 INJECTION INTRAMUSCULAR; INTRAVENOUS; SUBCUTANEOUS EVERY 8 HOURS
Refills: 0 | Status: DISCONTINUED | OUTPATIENT
Start: 2021-03-24 | End: 2021-03-25

## 2021-03-24 RX ORDER — SPIRONOLACTONE 25 MG/1
12.5 TABLET, FILM COATED ORAL
Refills: 0 | Status: DISCONTINUED | OUTPATIENT
Start: 2021-03-24 | End: 2021-03-25

## 2021-03-24 RX ADMIN — CLOPIDOGREL BISULFATE 75 MILLIGRAM(S): 75 TABLET, FILM COATED ORAL at 12:05

## 2021-03-24 RX ADMIN — SODIUM CHLORIDE 3 MILLILITER(S): 9 INJECTION INTRAMUSCULAR; INTRAVENOUS; SUBCUTANEOUS at 21:49

## 2021-03-24 RX ADMIN — Medication 1: at 16:58

## 2021-03-24 RX ADMIN — SPIRONOLACTONE 12.5 MILLIGRAM(S): 25 TABLET, FILM COATED ORAL at 12:06

## 2021-03-24 RX ADMIN — ATORVASTATIN CALCIUM 80 MILLIGRAM(S): 80 TABLET, FILM COATED ORAL at 21:51

## 2021-03-24 RX ADMIN — LOSARTAN POTASSIUM 25 MILLIGRAM(S): 100 TABLET, FILM COATED ORAL at 12:06

## 2021-03-24 RX ADMIN — JNJ-78436735 0.5 MILLILITER(S): 50000000000 SUSPENSION INTRAMUSCULAR at 17:55

## 2021-03-24 RX ADMIN — PANTOPRAZOLE SODIUM 40 MILLIGRAM(S): 20 TABLET, DELAYED RELEASE ORAL at 05:57

## 2021-03-24 RX ADMIN — Medication 81 MILLIGRAM(S): at 12:07

## 2021-03-24 RX ADMIN — SODIUM CHLORIDE 3 MILLILITER(S): 9 INJECTION INTRAMUSCULAR; INTRAVENOUS; SUBCUTANEOUS at 11:56

## 2021-03-24 NOTE — PROGRESS NOTE ADULT - ASSESSMENT
60M hx of ACC/AHA Stage C HF with NYHA Class I-II symptoms, ICM / HFrEF EF 25% s/p ICD 4/19, CAD s/p PCI 11/2018 to pLAD w/ residual  RCA/LCx, severe MR and DM2  3/22 S/P  MV Clip post op inotrope requirement CTU x 48 hrs  3/23 Echo completed  3/24 Tx 2 Rubio

## 2021-03-24 NOTE — DIETITIAN INITIAL EVALUATION ADULT. - OTHER INFO
Patient endorses some constipation, had small BM last night. No report of other GI distress (nausea/vomiting/diarrhea). Denies difficulty chewing/swallowing.     Patient reports weight gain x6 months due to improved appetite. Patient endorses a body weight of 165lbs x6 months ago, and a weight of 180lbs taken 3/21. Admitting weight of 184lbs (3/22) relatively consistent with patient reported weight history.     Patient with history of Type 2 Diabetes. On metformin at home. Checks fingersticks 1x daily and reports usual range of 90s- low 100s. A1c of 6.2% indicates good glycemic control.

## 2021-03-24 NOTE — DIETITIAN INITIAL EVALUATION ADULT. - CHIEF COMPLAINT
Patient is a 61y M PMH CAD s/p Impella assisted PCI of pLAD (x1), Chronic systolic HF (EF 27% in 10/2020) NYHA Class II (on Entresto/Spirolactone/Metoprolol), moderate-severe MR, T2DM, c/o increasing fatigue & worsening ARAIZA x4 months. S/p MITRACLIP 3/22/2021

## 2021-03-24 NOTE — PROGRESS NOTE ADULT - SUBJECTIVE AND OBJECTIVE BOX
Patient seen and examined at bedside.    Overnight Events:     Review Of Systems: No chest pain, shortness of breath, or palpitations            Current Meds:  acetaminophen   Tablet .. 650 milliGRAM(s) Oral every 6 hours PRN  aspirin  chewable 81 milliGRAM(s) Oral daily  atorvastatin 80 milliGRAM(s) Oral at bedtime  clopidogrel Tablet 75 milliGRAM(s) Oral daily  insulin lispro (ADMELOG) corrective regimen sliding scale   SubCutaneous Before meals and at bedtime  losartan 25 milliGRAM(s) Oral daily  pantoprazole    Tablet 40 milliGRAM(s) Oral before breakfast  polyethylene glycol 3350 17 Gram(s) Oral daily  sodium chloride 0.9% lock flush 3 milliLiter(s) IV Push every 8 hours  spironolactone 12.5 milliGRAM(s) Oral <User Schedule>      Vitals:  T(F): 98.1 (03-24), Max: 98.3 (03-24)  HR: 87 (03-24) (70 - 87)  BP: --  RR: 17 (03-24)  SpO2: 100% (03-24)  I&O's Summary    23 Mar 2021 07:01  -  24 Mar 2021 07:00  --------------------------------------------------------  IN: 2050 mL / OUT: 4300 mL / NET: -2250 mL        Physical Exam:  Appearance: No acute distress; well appearing  Eyes: PERRL, EOMI, pink conjunctiva  HEENT: Normal oral mucosa  Cardiovascular: RRR, S1, S2, 1/6 holosystolic murmur, no rubs, or gallops; no edema; no JVD  Respiratory: Clear to auscultation bilaterally  Gastrointestinal: soft, non-tender, non-distended with normal bowel sounds  Musculoskeletal: No clubbing; no joint deformity   Neurologic: Non-focal  Lymphatic: No lymphadenopathy  Psychiatry: AAOx3, mood & affect appropriate  Skin: No rashes, ecchymoses, or cyanosis                          11.5   4.97  )-----------( 87       ( 24 Mar 2021 00:35 )             35.3     03-24    140  |  107  |  14  ----------------------------<  114<H>  4.3   |  23  |  0.77    Ca    8.6      24 Mar 2021 00:35  Phos  2.5     03-24  Mg     2.1     03-24    TPro  5.9<L>  /  Alb  3.9  /  TBili  0.7  /  DBili  x   /  AST  21  /  ALT  16  /  AlkPhos  48  03-24    PT/INR - ( 22 Mar 2021 13:06 )   PT: 14.1 sec;   INR: 1.18 ratio         PTT - ( 22 Mar 2021 13:06 )  PTT:27.1 sec      Serum Pro-Brain Natriuretic Peptide: 487 pg/mL (03-23 @ 11:54)          New ECG(s): Personally reviewed    Echo: 3/23/21   Observations:  Mitral Valve: Patient status-post MitraClip placement.  MitraClip appears well seated. Mild-moderate mitral  regurgitation. Mean transmitral valve gradient equals 2-3  mm Hg. (HRabout 60 bpm)  Aortic Valve/Aorta: Normal trileaflet aortic valve. No  aortic valve regurgitation seen. Peak left ventricular  outflow tract gradient equals 5 mm Hg, mean gradient is  equal to 3 mm Hg, LVOT velocity time integral equals 23 cm.  Aortic Root: 3.5 cm.  Left Atrium: Normal left atrium.  LA volume index = 25  cc/m2.  Left Ventricle: Severe global left ventricular systolic  dysfunction with regional variation. Severe left  ventricular enlargement.  Right Heart: Normal right atrium. Normal right ventricular  size and function. A device wire is noted in the right  heart. Tricuspid valve not well visualized, probably  normal. Minimal tricuspid regurgitation.  Pericardium/Pleura: Normal pericardium with no pericardial  effusion.  Hemodynamic: Estimated right atrial pressure is 8 mm Hg.  ------------------------------------------------------------------------

## 2021-03-24 NOTE — DIETITIAN INITIAL EVALUATION ADULT. - ORAL INTAKE PTA/DIET HISTORY
Patient reports good appetite and PO intake PTA. Usual intake consists of cheerios, lean ground meats, salads, brown rice. Patient reports wife watches what he eats, and rinses high No known therapeutic restrictions. No known micronutrient/oral nutrition supplementation PTA. Confirms NKFA. Patient reports good appetite and PO intake PTA. Usual intake consists of cheerios, lean meats, salads, brown rice. Patient reports wife watches what he eats and he rinses off high sodium take out foods. Supplements with a multivitamin PTA. NKFA.

## 2021-03-24 NOTE — PROGRESS NOTE ADULT - SUBJECTIVE AND OBJECTIVE BOX
CRITICAL CARE ATTENDING - CTICU    MEDICATIONS  (STANDING):  aspirin  chewable 81 milliGRAM(s) Oral daily  atorvastatin 80 milliGRAM(s) Oral at bedtime  clopidogrel Tablet 75 milliGRAM(s) Oral daily  insulin lispro (ADMELOG) corrective regimen sliding scale   SubCutaneous Before meals and at bedtime  pantoprazole    Tablet 40 milliGRAM(s) Oral before breakfast  polyethylene glycol 3350 17 Gram(s) Oral daily                                    11.5   4.97  )-----------( 87       ( 24 Mar 2021 00:35 )             35.3       03-24    140  |  107  |  14  ----------------------------<  114<H>  4.3   |  23  |  0.77    Ca    8.6      24 Mar 2021 00:35  Phos  2.5     03-24  Mg     2.1     24    TPro  5.9<L>  /  Alb  3.9  /  TBili  0.7  /  DBili  x   /  AST  21  /  ALT  16  /  AlkPhos  48  03-24      PT/INR - ( 22 Mar 2021 13:06 )   PT: 14.1 sec;   INR: 1.18 ratio         PTT - ( 22 Mar 2021 13:06 )  PTT:27.1 sec        Daily     Daily Weight in k.7 (24 Mar 2021 00:00)       @ 07:  -   @ 07:00  --------------------------------------------------------  IN: 1142.1 mL / OUT: 1410 mL / NET: -267.9 mL     @ 07:01  -  24 @ 06:28  --------------------------------------------------------  IN: 2050 mL / OUT: 3350 mL / NET: -1300 mL        Critically Ill patient  : [ ] preoperative ,   [x] post operative    Requires :  [x] Arterial Line   [ ] Central Line  [ ] PA catheter  [ ] IABP  [ ] ECMO  [ ] LVAD  [ ] Ventilator  [x] pacemaker - PPM [ ] Impella.                      [ x] ABG's     [x ] Pulse Oxymetry Monitoring  Bedside evaluation , monitoring , treatment of hemodynamics , fluids , IVP/ IVCD meds.        Diagnosis:     POD 2 - Mitral clip     Hypovolemia    CHF- acute [ x]   chronic [ x]    systolic [x ]   diatolic [ ]          - Echo- EF - 27%  / mild to moderate MR      [ ] RV dysfunction          - Cxr-cardiomegally, edema          - Clinical-  [ ]inotropes   [ ]pressors   [ ]diuresis   [ ]IABP   [ ]ECMO   [ ]LVAD   [ ]Respiratory Failure    No catheters in either femoral artery or vein - Left Deployment site - no hematomas  or swelling     Thrombocytopenia    PPM - increase PPM rate for MAP    Requires bedside physical therapy, mobilization and total senior living care.     DMT2                By signing my name below, I, Lelo Mckeon, attest that this documentation has been prepared under the direction and in the presence of Reese Tavares MD.   Electronically Signed: Lowell Oliver 21 @ 06:28      Discussed with CT surgeon, Physician Assistant - Nurse Practitioner- Critical care medicine team.   Dicussed at  AM / PM rounds.   Chart, labs , films reviewed.    Cumulative Critical Care Time Given Today:  CRITICAL CARE ATTENDING - CTICU    MEDICATIONS  (STANDING):  aspirin  chewable 81 milliGRAM(s) Oral daily  atorvastatin 80 milliGRAM(s) Oral at bedtime  clopidogrel Tablet 75 milliGRAM(s) Oral daily  insulin lispro (ADMELOG) corrective regimen sliding scale   SubCutaneous Before meals and at bedtime  pantoprazole    Tablet 40 milliGRAM(s) Oral before breakfast  polyethylene glycol 3350 17 Gram(s) Oral daily                                    11.5   4.97  )-----------( 87       ( 24 Mar 2021 00:35 )             35.3       03-24    140  |  107  |  14  ----------------------------<  114<H>  4.3   |  23  |  0.77    Ca    8.6      24 Mar 2021 00:35  Phos  2.5     03-24  Mg     2.1     24    TPro  5.9<L>  /  Alb  3.9  /  TBili  0.7  /  DBili  x   /  AST  21  /  ALT  16  /  AlkPhos  48  03-24      PT/INR - ( 22 Mar 2021 13:06 )   PT: 14.1 sec;   INR: 1.18 ratio         PTT - ( 22 Mar 2021 13:06 )  PTT:27.1 sec        Daily     Daily Weight in k.7 (24 Mar 2021 00:00)       @ 07:  -   @ 07:00  --------------------------------------------------------  IN: 1142.1 mL / OUT: 1410 mL / NET: -267.9 mL     @ 07:01  -  24 @ 06:28  --------------------------------------------------------  IN: 2050 mL / OUT: 3350 mL / NET: -1300 mL        Critically Ill patient  : [ ] preoperative ,   [x] post operative    Requires :  [x] Arterial Line   [ ] Central Line  [ ] PA catheter  [ ] IABP  [ ] ECMO  [ ] LVAD  [ ] Ventilator  [x] pacemaker - PPM [ ] Impella.                      [ x] ABG's     [x ] Pulse Oxymetry Monitoring  Bedside evaluation , monitoring , treatment of hemodynamics , fluids , IVP/ IVCD meds.        Diagnosis:     Hypotension    Hemodynamic lability,  instability. Requires IVCD [ x] vasopressors  - on / off Roque. [ ] inotropes  [ ] vasodilator  [ ]IVSS fluid  to maintain MAP, perfusion, C.I.     POD 2 - Mitral clip     Hypovolemia    CHF- acute [ x]   chronic [ x]    systolic [x ]   diatolic [ ]          - Echo- EF - 27%  / mild to moderate MR      [ ] RV dysfunction          - Cxr-cardiomegally, edema          - Clinical-  [ ]inotropes   [ ]pressors   [x ]diuresis   [ ]IABP   [ ]ECMO   [ ]LVAD   [ ]Respiratory Failure    No catheters in either femoral artery or vein - Left Deployment site - no hematomas  or swelling     Thrombocytopenia    PPM - increase PPM rate for MAP    Requires bedside physical therapy, mobilization and total [facility] care.     DMT2                By signing my name below, ILelo, attest that this documentation has been prepared under the direction and in the presence of Reese Tavares MD.   Electronically Signed: Lowell Oliver 21 @ 06:28    IReese, personally performed the services described in this documentation. All medical record entries made by the scribe were at my direction and in my presence. I have reviewed the chart and agree that the record reflects my personal performance and is accurate and complete.   Reese Tavares MD.       Discussed with CT surgeon, Physician Assistant - Nurse Practitioner- Critical care medicine team.   Dicussed at  AM / PM rounds.   Chart, labs , films reviewed.    Cumulative Critical Care Time Given Today: 20 min

## 2021-03-24 NOTE — PROGRESS NOTE ADULT - SUBJECTIVE AND OBJECTIVE BOX
Structural Heart Team    Mr Jefferson says he feels good and has no complaints.  He denies chest pain/pressure, sob and dizziness as well as groin pain.  He ambulates without difficulty.         REVIEW OF SYSTEMS:    CONSTITUTIONAL: No weakness, fevers or chills  EYES/ENT: No visual changes;  No vertigo or throat pain   NECK: No pain or stiffness  RESPIRATORY: No cough, wheezing, hemoptysis; No shortness of breath  CARDIOVASCULAR: No chest pain or palpitations  GASTROINTESTINAL: No abdominal or epigastric pain. No nausea, vomiting, or hematemesis; No diarrhea or constipation. No melena or hematochezia.  GENITOURINARY: No dysuria, frequency or hematuria  NEUROLOGICAL: No numbness or weakness  SKIN: No itching, rashes      Allergies    No Known Allergies    Intolerances      Vital Signs Last 24 Hrs  T(C): 36.6 (24 Mar 2021 12:00), Max: 36.8 (24 Mar 2021 00:00)  T(F): 97.8 (24 Mar 2021 12:00), Max: 98.3 (24 Mar 2021 00:00)  HR: 70 (24 Mar 2021 12:00) (70 - 87)  BP: 124/79 (24 Mar 2021 12:00) (124/79 - 124/79)  BP(mean): 94 (24 Mar 2021 12:00) (94 - 94)  RR: 189 (24 Mar 2021 12:00) (9 - 189)  SpO2: 100% (24 Mar 2021 09:00) (97% - 100%)    MEDICATIONS  (STANDING):  aspirin  chewable 81 milliGRAM(s) Oral daily  atorvastatin 80 milliGRAM(s) Oral at bedtime  clopidogrel Tablet 75 milliGRAM(s) Oral daily  coronavirus (EUA) Vaccine ([employer]) 0.5 milliLiter(s) IntraMuscular once  insulin lispro (ADMELOG) corrective regimen sliding scale   SubCutaneous Before meals and at bedtime  losartan 25 milliGRAM(s) Oral daily  pantoprazole    Tablet 40 milliGRAM(s) Oral before breakfast  polyethylene glycol 3350 17 Gram(s) Oral daily  sodium chloride 0.9% lock flush 3 milliLiter(s) IV Push every 8 hours  spironolactone 12.5 milliGRAM(s) Oral <User Schedule>      Exam-  General: NAD, WDWN, appropriate affect  Cor: s1s2, RRR, no murmurs   Pulm: Clear, no wheezes, rales, or rhonchi, no use of accessory muscles  Gastrointestinal: soft, nontender, nondistended, +bowel sounds  Extremities: no edema, 2+ DP/PT pulses  Groin: nontender, clean and dry, soft, no hematoma b/l  Neuro: A&Ox3, nonfocal                          11.5   4.97  )-----------( 87       ( 24 Mar 2021 00:35 )             35.3   03-24    140  |  107  |  14  ----------------------------<  114<H>  4.3   |  23  |  0.77    Ca    8.6      24 Mar 2021 00:35  Phos  2.5     03-24  Mg     2.1     03-24    TPro  5.9<L>  /  Alb  3.9  /  TBili  0.7  /  DBili  x   /  AST  21  /  ALT  16  /  AlkPhos  48  03-24    I&O's Summary    23 Mar 2021 07:01  -  24 Mar 2021 07:00  --------------------------------------------------------  IN: 2050 mL / OUT: 4300 mL / NET: -2250 mL    24 Mar 2021 07:01  -  24 Mar 2021 16:48  --------------------------------------------------------  IN: 0 mL / OUT: 400 mL / NET: -400 mL              Assessment/Plan:  Mr Jefferson is a 62y/o male POD 1 s/p Mitraclip for severe MR  - on asa/plavix  - medical optimization as per heart failure team  - intraop Clip MR reduction from severe to mild  -- post Clip TTE mild/moderate  - ambulate as tolerated   - d/c planning      - Discharge plan: follow up with Dr. Jarquin in one week and follow up with Structural Heart Team in one month.  Echo will be done at 1 month follow up visit.  Plan discussed with patient     HERBERT Ambriz  [phone]

## 2021-03-24 NOTE — PROGRESS NOTE ADULT - SUBJECTIVE AND OBJECTIVE BOX
Subjective: "Feel good, didnt sleep that well last nite"  OOB chair      Tele:  SR  70s           V/S:                    T(F): 98.1 (21 @ 08:00), Max: 98.3 (21 @ 00:00)  HR: 87 (21 @ 09:00) (70 - 87)  BP: --  RR: 17 (21 @ 09:00) (9 - 33)  SpO2: 100% (21 @ 09:00) (97% - 100%)  Wt(kg): --      LV EF:      Labs:      140  |  107  |  14  ----------------------------<  114<H>  4.3   |  23  |  0.77    Ca    8.6      24 Mar 2021 00:35  Phos  2.5       Mg     2.1         TPro  5.9<L>  /  Alb  3.9  /  TBili  0.7  /  DBili  x   /  AST  21  /  ALT  16  /  AlkPhos  48                                 11.5   4.97  )-----------( 87       ( 24 Mar 2021 00:35 )             35.3        PT/INR - ( 22 Mar 2021 13:06 )   PT: 14.1 sec;   INR: 1.18 ratio         PTT - ( 22 Mar 2021 13:06 )  PTT:27.1 sec     CAPILLARY BLOOD GLUCOSE      POCT Blood Glucose.: 121 mg/dL (24 Mar 2021 11:14)  POCT Blood Glucose.: 96 mg/dL (24 Mar 2021 07:48)  POCT Blood Glucose.: 123 mg/dL (23 Mar 2021 21:10)  POCT Blood Glucose.: 137 mg/dL (23 Mar 2021 16:30)  POCT Blood Glucose.: 104 mg/dL (23 Mar 2021 11:43)           Echo < from: Transthoracic Echocardiogram (21 @ 09:06) >  Conclusions:  1. Patient status-post MitraClip placement. MitraClip  appears well seated. Mild-moderate mitral regurgitation.  Mean transmitral valve gradient equals 2-3 mm Hg. (HRabout  60 bpm)  2. Normal trileaflet aortic valve. No aortic valve  regurgitation seen.  3. Severe global left ventricular systolic dysfunction with  regional variation.  4. Normal right ventricular size and function. A device  wire is noted in the rightheart    < end of copied text >      I&O's Detail    23 Mar 2021 07:01  -  24 Mar 2021 07:00  --------------------------------------------------------  IN:    Albumin 5%  - 250 mL: 500 mL    Oral Fluid: 250 mL    Phenylephrine: 25 mL    sodium chloride 0.9%: 1275 mL  Total IN: 2050 mL    OUT:    Voided (mL): 4300 mL  Total OUT: 4300 mL    Total NET: -2250 mL      BOWEL MOVEMENT:  [ x] YES [ ] NO      Daily     Daily Weight in k.7 (24 Mar 2021 00:00)  Medications:  acetaminophen   Tablet .. 650 milliGRAM(s) Oral every 6 hours PRN  aspirin  chewable 81 milliGRAM(s) Oral daily  atorvastatin 80 milliGRAM(s) Oral at bedtime  clopidogrel Tablet 75 milliGRAM(s) Oral daily  insulin lispro (ADMELOG) corrective regimen sliding scale   SubCutaneous Before meals and at bedtime  losartan 25 milliGRAM(s) Oral daily  pantoprazole    Tablet 40 milliGRAM(s) Oral before breakfast  polyethylene glycol 3350 17 Gram(s) Oral daily  sodium chloride 0.9% lock flush 3 milliLiter(s) IV Push every 8 hours  spironolactone 12.5 milliGRAM(s) Oral <User Schedule>        Physical Exam:    Appearance: No acute distress; well appearing    Cardiovascular: RRR, S1, S2, 1/6 holosystolic murmur, no rubs, or gallops;     Respiratory: Clear to auscultation bilaterally    Gastrointestinal: soft, non-tender, non-distended  + BM this am    groin: R groin incision intact, tender, no erythema                   PAST MEDICAL & SURGICAL HISTORY:  Chronic systolic heart failure    Type 2 diabetes mellitus    Nonrheumatic mitral valve regurgitation    Stented coronary artery    Coronary artery disease due to lipid rich plaque    Acute myocardial infarction    Ischemic cardiomyopathy with implantable cardioverter-defibrillator (ICD)    Hyperlipidemia    Cardiomyopathy    History of implantable cardioverter-defibrillator (ICD) insertion  Silith.IO implanted 2019

## 2021-03-24 NOTE — PROGRESS NOTE ADULT - TIME BILLING
All questions and concerns of the patient were addressed.    Findings and plan discussed with CTU team, cardiac surgery/Dr. Jarquin and structural heart team
Discussed with CTU team, cardiac surgery team and structural heart team.

## 2021-03-24 NOTE — DIETITIAN INITIAL EVALUATION ADULT. - PERTINENT LABORATORY DATA
serum glucose 114,   A1c 6.2% (3/19)  POCT Blood Glucose.: 96 mg/dL (24 Mar 2021 07:48)  POCT Blood Glucose.: 123 mg/dL (23 Mar 2021 21:10)  POCT Blood Glucose.: 137 mg/dL (23 Mar 2021 16:30)  POCT Blood Glucose.: 104 mg/dL (23 Mar 2021 11:43)

## 2021-03-24 NOTE — DIETITIAN INITIAL EVALUATION ADULT. - OTHER CALCULATIONS
Dosing weight used for all calculations Dosing weight used for all calculations. Fluid needs deferred to team

## 2021-03-25 ENCOUNTER — TRANSCRIPTION ENCOUNTER (OUTPATIENT)
Age: 61
End: 2021-03-25

## 2021-03-25 VITALS
DIASTOLIC BLOOD PRESSURE: 72 MMHG | RESPIRATION RATE: 18 BRPM | OXYGEN SATURATION: 99 % | HEART RATE: 70 BPM | SYSTOLIC BLOOD PRESSURE: 112 MMHG

## 2021-03-25 LAB
ANION GAP SERPL CALC-SCNC: 9 MMOL/L — SIGNIFICANT CHANGE UP (ref 5–17)
BASOPHILS # BLD AUTO: 0.01 K/UL — SIGNIFICANT CHANGE UP (ref 0–0.2)
BASOPHILS NFR BLD AUTO: 0.2 % — SIGNIFICANT CHANGE UP (ref 0–2)
BUN SERPL-MCNC: 13 MG/DL — SIGNIFICANT CHANGE UP (ref 7–23)
CALCIUM SERPL-MCNC: 9 MG/DL — SIGNIFICANT CHANGE UP (ref 8.4–10.5)
CHLORIDE SERPL-SCNC: 105 MMOL/L — SIGNIFICANT CHANGE UP (ref 96–108)
CO2 SERPL-SCNC: 22 MMOL/L — SIGNIFICANT CHANGE UP (ref 22–31)
CREAT SERPL-MCNC: 0.74 MG/DL — SIGNIFICANT CHANGE UP (ref 0.5–1.3)
EOSINOPHIL # BLD AUTO: 0.13 K/UL — SIGNIFICANT CHANGE UP (ref 0–0.5)
EOSINOPHIL NFR BLD AUTO: 2.6 % — SIGNIFICANT CHANGE UP (ref 0–6)
GLUCOSE BLDC GLUCOMTR-MCNC: 177 MG/DL — HIGH (ref 70–99)
GLUCOSE BLDC GLUCOMTR-MCNC: 91 MG/DL — SIGNIFICANT CHANGE UP (ref 70–99)
GLUCOSE SERPL-MCNC: 107 MG/DL — HIGH (ref 70–99)
HCT VFR BLD CALC: 38.5 % — LOW (ref 39–50)
HGB BLD-MCNC: 12.7 G/DL — LOW (ref 13–17)
IMM GRANULOCYTES NFR BLD AUTO: 0.2 % — SIGNIFICANT CHANGE UP (ref 0–1.5)
LYMPHOCYTES # BLD AUTO: 1.27 K/UL — SIGNIFICANT CHANGE UP (ref 1–3.3)
LYMPHOCYTES # BLD AUTO: 25.2 % — SIGNIFICANT CHANGE UP (ref 13–44)
MCHC RBC-ENTMCNC: 30.4 PG — SIGNIFICANT CHANGE UP (ref 27–34)
MCHC RBC-ENTMCNC: 33 GM/DL — SIGNIFICANT CHANGE UP (ref 32–36)
MCV RBC AUTO: 92.1 FL — SIGNIFICANT CHANGE UP (ref 80–100)
MONOCYTES # BLD AUTO: 0.57 K/UL — SIGNIFICANT CHANGE UP (ref 0–0.9)
MONOCYTES NFR BLD AUTO: 11.3 % — SIGNIFICANT CHANGE UP (ref 2–14)
NEUTROPHILS # BLD AUTO: 3.04 K/UL — SIGNIFICANT CHANGE UP (ref 1.8–7.4)
NEUTROPHILS NFR BLD AUTO: 60.5 % — SIGNIFICANT CHANGE UP (ref 43–77)
NRBC # BLD: 0 /100 WBCS — SIGNIFICANT CHANGE UP (ref 0–0)
PLATELET # BLD AUTO: 95 K/UL — LOW (ref 150–400)
POTASSIUM SERPL-MCNC: 4.1 MMOL/L — SIGNIFICANT CHANGE UP (ref 3.5–5.3)
POTASSIUM SERPL-SCNC: 4.1 MMOL/L — SIGNIFICANT CHANGE UP (ref 3.5–5.3)
RBC # BLD: 4.18 M/UL — LOW (ref 4.2–5.8)
RBC # FLD: 13.1 % — SIGNIFICANT CHANGE UP (ref 10.3–14.5)
SARS-COV-2 RNA SPEC QL NAA+PROBE: SIGNIFICANT CHANGE UP
SODIUM SERPL-SCNC: 136 MMOL/L — SIGNIFICANT CHANGE UP (ref 135–145)
WBC # BLD: 5.03 K/UL — SIGNIFICANT CHANGE UP (ref 3.8–10.5)
WBC # FLD AUTO: 5.03 K/UL — SIGNIFICANT CHANGE UP (ref 3.8–10.5)

## 2021-03-25 PROCEDURE — 84100 ASSAY OF PHOSPHORUS: CPT

## 2021-03-25 PROCEDURE — 99239 HOSP IP/OBS DSCHRG MGMT >30: CPT

## 2021-03-25 PROCEDURE — 93005 ELECTROCARDIOGRAM TRACING: CPT

## 2021-03-25 PROCEDURE — 83605 ASSAY OF LACTIC ACID: CPT

## 2021-03-25 PROCEDURE — 80053 COMPREHEN METABOLIC PANEL: CPT

## 2021-03-25 PROCEDURE — P9045: CPT

## 2021-03-25 PROCEDURE — 93355 ECHO TRANSESOPHAGEAL (TEE): CPT

## 2021-03-25 PROCEDURE — 84295 ASSAY OF SERUM SODIUM: CPT

## 2021-03-25 PROCEDURE — C1894: CPT

## 2021-03-25 PROCEDURE — C9399: CPT

## 2021-03-25 PROCEDURE — 82435 ASSAY OF BLOOD CHLORIDE: CPT

## 2021-03-25 PROCEDURE — 83735 ASSAY OF MAGNESIUM: CPT

## 2021-03-25 PROCEDURE — 82803 BLOOD GASES ANY COMBINATION: CPT

## 2021-03-25 PROCEDURE — 86923 COMPATIBILITY TEST ELECTRIC: CPT

## 2021-03-25 PROCEDURE — 80048 BASIC METABOLIC PNL TOTAL CA: CPT

## 2021-03-25 PROCEDURE — 71045 X-RAY EXAM CHEST 1 VIEW: CPT | Mod: 26

## 2021-03-25 PROCEDURE — 82947 ASSAY GLUCOSE BLOOD QUANT: CPT

## 2021-03-25 PROCEDURE — 85730 THROMBOPLASTIN TIME PARTIAL: CPT

## 2021-03-25 PROCEDURE — 86901 BLOOD TYPING SEROLOGIC RH(D): CPT

## 2021-03-25 PROCEDURE — U0005: CPT

## 2021-03-25 PROCEDURE — 86850 RBC ANTIBODY SCREEN: CPT

## 2021-03-25 PROCEDURE — 85025 COMPLETE CBC W/AUTO DIFF WBC: CPT

## 2021-03-25 PROCEDURE — 85027 COMPLETE CBC AUTOMATED: CPT

## 2021-03-25 PROCEDURE — 97165 OT EVAL LOW COMPLEX 30 MIN: CPT

## 2021-03-25 PROCEDURE — C1889: CPT

## 2021-03-25 PROCEDURE — 93306 TTE W/DOPPLER COMPLETE: CPT

## 2021-03-25 PROCEDURE — 33418 REPAIR TCAT MITRAL VALVE: CPT | Mod: Q0

## 2021-03-25 PROCEDURE — 82330 ASSAY OF CALCIUM: CPT

## 2021-03-25 PROCEDURE — U0003: CPT

## 2021-03-25 PROCEDURE — 82962 GLUCOSE BLOOD TEST: CPT

## 2021-03-25 PROCEDURE — 85018 HEMOGLOBIN: CPT

## 2021-03-25 PROCEDURE — 85014 HEMATOCRIT: CPT

## 2021-03-25 PROCEDURE — 84132 ASSAY OF SERUM POTASSIUM: CPT

## 2021-03-25 PROCEDURE — 71045 X-RAY EXAM CHEST 1 VIEW: CPT

## 2021-03-25 PROCEDURE — 86900 BLOOD TYPING SEROLOGIC ABO: CPT

## 2021-03-25 PROCEDURE — 85610 PROTHROMBIN TIME: CPT

## 2021-03-25 PROCEDURE — 83880 ASSAY OF NATRIURETIC PEPTIDE: CPT

## 2021-03-25 PROCEDURE — 99233 SBSQ HOSP IP/OBS HIGH 50: CPT | Mod: GC

## 2021-03-25 PROCEDURE — C1769: CPT

## 2021-03-25 PROCEDURE — 0031A: CPT

## 2021-03-25 RX ORDER — SACUBITRIL AND VALSARTAN 24; 26 MG/1; MG/1
1 TABLET, FILM COATED ORAL
Refills: 0 | Status: DISCONTINUED | OUTPATIENT
Start: 2021-03-26 | End: 2021-03-25

## 2021-03-25 RX ORDER — SPIRONOLACTONE 25 MG/1
0.5 TABLET, FILM COATED ORAL
Qty: 0 | Refills: 0 | DISCHARGE

## 2021-03-25 RX ORDER — METOPROLOL TARTRATE 50 MG
1 TABLET ORAL
Qty: 60 | Refills: 1
Start: 2021-03-25 | End: 2021-05-23

## 2021-03-25 RX ORDER — CLOPIDOGREL BISULFATE 75 MG/1
1 TABLET, FILM COATED ORAL
Qty: 30 | Refills: 1
Start: 2021-03-25 | End: 2021-05-23

## 2021-03-25 RX ORDER — SACUBITRIL AND VALSARTAN 24; 26 MG/1; MG/1
1 TABLET, FILM COATED ORAL
Refills: 0 | Status: DISCONTINUED | OUTPATIENT
Start: 2021-03-25 | End: 2021-03-25

## 2021-03-25 RX ORDER — METOPROLOL TARTRATE 50 MG
25 TABLET ORAL
Refills: 0 | Status: DISCONTINUED | OUTPATIENT
Start: 2021-03-25 | End: 2021-03-25

## 2021-03-25 RX ORDER — ASPIRIN/CALCIUM CARB/MAGNESIUM 324 MG
1 TABLET ORAL
Qty: 0 | Refills: 0 | DISCHARGE
Start: 2021-03-25

## 2021-03-25 RX ORDER — SPIRONOLACTONE 25 MG/1
0.5 TABLET, FILM COATED ORAL
Qty: 7.5 | Refills: 1
Start: 2021-03-25 | End: 2021-05-23

## 2021-03-25 RX ORDER — METOPROLOL TARTRATE 50 MG
1 TABLET ORAL
Qty: 0 | Refills: 0 | DISCHARGE

## 2021-03-25 RX ORDER — ACETAMINOPHEN 500 MG
2 TABLET ORAL
Qty: 0 | Refills: 0 | DISCHARGE
Start: 2021-03-25

## 2021-03-25 RX ORDER — SACUBITRIL AND VALSARTAN 24; 26 MG/1; MG/1
1 TABLET, FILM COATED ORAL
Qty: 0 | Refills: 0 | DISCHARGE

## 2021-03-25 RX ORDER — SACUBITRIL AND VALSARTAN 24; 26 MG/1; MG/1
1 TABLET, FILM COATED ORAL
Qty: 60 | Refills: 1
Start: 2021-03-25 | End: 2021-05-23

## 2021-03-25 RX ORDER — ASPIRIN/CALCIUM CARB/MAGNESIUM 324 MG
81 TABLET ORAL DAILY
Refills: 0 | Status: DISCONTINUED | OUTPATIENT
Start: 2021-03-25 | End: 2021-03-25

## 2021-03-25 RX ADMIN — CLOPIDOGREL BISULFATE 75 MILLIGRAM(S): 75 TABLET, FILM COATED ORAL at 11:04

## 2021-03-25 RX ADMIN — LOSARTAN POTASSIUM 25 MILLIGRAM(S): 100 TABLET, FILM COATED ORAL at 06:42

## 2021-03-25 RX ADMIN — SODIUM CHLORIDE 3 MILLILITER(S): 9 INJECTION INTRAMUSCULAR; INTRAVENOUS; SUBCUTANEOUS at 06:41

## 2021-03-25 RX ADMIN — Medication 25 MILLIGRAM(S): at 11:03

## 2021-03-25 RX ADMIN — PANTOPRAZOLE SODIUM 40 MILLIGRAM(S): 20 TABLET, DELAYED RELEASE ORAL at 06:42

## 2021-03-25 RX ADMIN — Medication 1: at 09:07

## 2021-03-25 RX ADMIN — Medication 81 MILLIGRAM(S): at 11:04

## 2021-03-25 NOTE — PROGRESS NOTE ADULT - PROBLEM SELECTOR PLAN 2
-severe symptomatic MR s/p Mitral clip 3/22  -TTE 3/23 with reduction in MR from severe to mild-moderate  -c/w ASA/Plavix per structural cards
-severe symptomatic MR s/p Mitral clip 3/22  -TTE today with reduction in MR from severe to mild-moderate  -c/w ASA/Plavix per structural cards
-severe symptomatic MR s/p Mitral clip 3/22  -TTE today with reduction in MR from severe to mild-moderate  -c/w ASA/Plavix per structural cards
-severe symptomatic MR s/p Mitral clip 3/22  -TTE 3/23 with reduction in MR from severe to mild-moderate  -c/w ASA/Plavix per structural cards

## 2021-03-25 NOTE — DISCHARGE NOTE NURSING/CASE MANAGEMENT/SOCIAL WORK - PATIENT PORTAL LINK FT
You can access the FollowMyHealth Patient Portal offered by [facility] by registering at the following website: [email or web address]. By joining [facility]’s FollowMyHealth portal, you will also be able to view your health information using other applications (apps) compatible with our system.

## 2021-03-25 NOTE — DISCHARGE NOTE PROVIDER - NSDCHHPTASSISTHOME_GEN_ALL_CORE
Consent: Written consent obtained. Risks include but not limited to lid/brow ptosis, bruising, swelling, diplopia, temporary effect, incomplete chemical denervation. Leaving Home is Contraindicated...

## 2021-03-25 NOTE — DISCHARGE NOTE PROVIDER - NSDCFUADDINST_GEN_ALL_CORE_FT
Mitral Clip discharge instructions>Keep femoral or groin site clean,please shower daily and pat site dry.Watch site for signs of reddness or drainage, these should be reported to your doctor.You will receive a card about your valve in the mail,please carry in your wallet.

## 2021-03-25 NOTE — DISCHARGE NOTE PROVIDER - HOSPITAL COURSE
60M hx of ACC/AHA Stage C HF with NYHA Class I-II symptoms, ICM / HFrEF EF 25% s/p ICD 4/19, CAD s/p PCI 11/2018 to pLAD w/ residual  RCA/LCx, severe MR and DM2  3/22 S/P  MV Clip post op inotrope requirement CTU x 48 hrs  3/23 Echo completed  3/24 Tx 2 Rubio  3/25 Toprol bid, add entresto 3/26  D/C home 60M hx of ACC/AHA Stage C HF with NYHA Class I-II symptoms, ICM / HFrEF EF 25% s/p ICD 4/19, CAD s/p PCI 11/2018 to pLAD w/ residual  RCA/LCx, severe MR and DM2  3/22 S/P  MV Clip post op inotrope requirement CTU x 48 hrs  3/23 Echo completed  3/24 Tx 2 Rubio  Covid vaccine Charlie & Charlie given  3/25 Toprol bid, add entresto 3/26  D/C home

## 2021-03-25 NOTE — DISCHARGE NOTE PROVIDER - NSDCFUADDAPPT_GEN_ALL_CORE_FT
Your Care Navigator Nurse Practitioner will be in touch to see you in your home within a few days from discharge. The Follow Your Heart program can help ensure you understand your medications, discharge instructions and answer any questions you may have at that time. They are also a great source to address concerns during the day and may be reached at [phone].    Dr Jarquin appt March 30 at 130 pm

## 2021-03-25 NOTE — PROGRESS NOTE ADULT - PROBLEM SELECTOR PROBLEM 1
Ischemic cardiomyopathy with implantable cardioverter-defibrillator (ICD)

## 2021-03-25 NOTE — PROGRESS NOTE ADULT - PROBLEM SELECTOR PROBLEM 2
Nonrheumatic mitral valve regurgitation

## 2021-03-25 NOTE — DISCHARGE NOTE PROVIDER - NSDCFUSCHEDAPPT_GEN_ALL_CORE_FT
HOME RAMIREZ ; 04/26/2021 ; [facility] [address] [place] ; 05/03/2021 ; [facility] [address]  [facility] [place] ; 03/30/2021 ; [facility] [address] [place] ; 04/26/2021 ; [facility] [address] [place] ; 05/03/2021 ; [facility] [address]

## 2021-03-25 NOTE — PROGRESS NOTE ADULT - PROBLEM SELECTOR PLAN 1
-baseline HFrEF w/ EF 25%, LVEDD 6cm  -euvolemic today  -switch entresto 24-26mg BID starting tomorrow  -start Toprol XL 25mg PO BID  -c/w spironolactone 12.5mg PO qOd  -pt is okay for discharge home with outpatient follow up on 3/31 @9:45AM w/ Dr. Triana
-baseline HFrEF w/ EF 25%, LVEDD 6cm  -hypovolemic/orthostatic this AM  -hold Entresto for now that pt is on phenylephrine post-procedure; will f/u AM labs and consider reinitation at this time  -hold spironolactone 12.5mg PO qOd  -hold home Toprol XL 75mg PO BID; likely resume inpatient once pressors weaned off  -s/p albumin challenge in AM; remains hypovolemic  -give NS@75cc/hr for 6 hours  -encourage PO intake
-baseline HFrEF w/ EF 25%, LVEDD 6cm  -mildly hypovolemic today, however off pressors  -start Losartan 25mg PO daily (on Entresto as outpt)  -hold spironolactone 12.5mg PO qOd  -hold home Toprol XL 75mg PO BID  -encourage PO intake
Hold entresto/Toprol as per heart failure  Resume losartan this am   Spirinolactone QOD  -encourage PO intake

## 2021-03-25 NOTE — PROGRESS NOTE ADULT - ATTENDING COMMENTS
echo    increase PPM rate
Patient appears euvolemic on clinical examination.  Plan to restart patients neurohormonal blockade medications/Entresto.  He is feeling good and has no complaints.  Right groin ecchymosis.  If he continues to do well plan for discharge later today.  TTE was personally reviewed that demonstrated mild to moderate mitral regurgitation.  No events on telemetry.
Patient clinically doing well.  He reports feeling "stronger."  No cardiopulmonary complaints at rest.  He has not ambulated around the unit.  Was found to be orthostatic but denies any symptoms.  He received NS for 6 hours.  Entresto being held.  Reviewed TTE personally that demonstrated mild to moderate mitral regurgitation with mMVG 2-3mmHg.  If necessary it was explained to the patient a 2nd MitraClip may be placed if clinically indicated based upon how he is doing in the future.  Discussed with CTU PA plan to remove stitch from right groin.  He is being medically optimized for discharge in the next 24-48 hours.  Entresto being held.  He appears euvolemic at time of examination.    All questions and concerns were addressed.
Briefly, 60M hx of ACC/AHA Stage C HF, ICM / HFrEF EF 25% s/p ICD 4/19, CAD s/p PCI 11/2018 to pLAD w/ residual  RCA/LCx, severe MR and DM2 admitted for elective mitral clip. Underwent successful clip with improvement to mild-mod MR with hypotension requiring jourdan which is being weaned off. Denies complaints. On exam, JVP approx 6 cm with mild HJR, RRR, CTAB, nontender abdomen, no pedal edema, WWP. Labs reviewed. TTE reviewed - severe LV dysfunction, mild-mod MR, VTI 22, nl RV function.   - encourage hydration  - wean jourdan as tolerated to goal BP >90  - will consider initiating GDMT once off pressors
Briefly, 60M hx of ACC/AHA Stage C HF, ICM / HFrEF EF 25% s/p ICD 4/19, CAD s/p PCI 11/2018 to pLAD w/ residual  RCA/LCx, severe MR and DM2 admitted for elective mitral clip. Underwent successful clip with improvement to mild-mod MR with hypotension requiring jourdan which is being weaned off. Denies complaints. On exam, JVP approx 6 cm with mild HJR, RRR, CTAB, nontender abdomen, no pedal edema, WWP. Labs reviewed. TTE reviewed - severe LV dysfunction, mild-mod MR, VTI 22, nl RV function.   - encourage hydration  - will resume losartan 25 mg daily   - standing weights daily
Briefly, 60M hx of ACC/AHA Stage C HF, ICM / HFrEF EF 25% s/p ICD 4/19, CAD s/p PCI 11/2018 to pLAD w/ residual  RCA/LCx, severe MR and DM2 admitted for elective mitral clip. Underwent successful clip with improvement to mild-mod MR with hypotension requiring jourdan which was weaned off. Denies complaints. On exam, JVP approx 6 cm with mild HJR, RRR, CTAB, nontender abdomen, no pedal edema, WWP. Labs reviewed. TTE reviewed - severe LV dysfunction, mild-mod MR, VTI 22, nl RV function.   - switch losartan to Entresto 24/26  - c/w geni every other day  - start toprol xl 25 mg twice/day  - stable for d/c with close f/u

## 2021-03-25 NOTE — DISCHARGE NOTE NURSING/CASE MANAGEMENT/SOCIAL WORK - NSDCFUADDAPPT_GEN_ALL_CORE_FT
Your Care Navigator Nurse Practitioner will be in touch to see you in your home within a few days from discharge. The [facility] program can help ensure you understand your medications, discharge instructions and answer any questions you may have at that time. They are also a great source to address concerns during the day and may be reached at [phone].    Dr Jarquin appt March 30 at 130 pm

## 2021-03-25 NOTE — DISCHARGE NOTE PROVIDER - CARE PROVIDER_API CALL
Radha Jarquin)  Thoracic and Cardiac Surgery  [address] [place] [address]  Phone: [phone]  Fax: [phone]  Scheduled Appointment: 03/30/2021 01:30 PM    Sravan Triana)  Cardiovascular Disease; Internal Medicine  [address] [place] [id number]  Phone: [phone]  Fax: [phone]  Follow Up Time:

## 2021-03-25 NOTE — DISCHARGE NOTE PROVIDER - NSDCMRMEDTOKEN_GEN_ALL_CORE_FT
acetaminophen 325 mg oral tablet: 2 tab(s) orally every 6 hours, As needed, Mild Pain (1 - 3)  aspirin 81 mg oral delayed release tablet: 1 tab(s) orally once a day  atorvastatin 80 mg oral tablet: 1 tab(s) orally once a day (at bedtime)  metFORMIN 1000 mg oral tablet: 1 tab(s) orally 2 times a day    metoprolol succinate 25 mg oral tablet, extended release: 1 tab(s) orally 2 times a day  Plavix 75 mg oral tablet: 1 tab(s) orally once a day  sacubitril-valsartan 24 mg-26 mg oral tablet: 1 tab(s) orally 2 times a day  spironolactone 25 mg oral tablet: 0.5 tab(s) orally every other day

## 2021-03-25 NOTE — DISCHARGE NOTE NURSING/CASE MANAGEMENT/SOCIAL WORK - NSDCVIVACCINE_GEN_ALL_CORE_FT
Severe acute respiratory syndrome coronavirus 2 (SARS-CoV-2) (Coronavirus disease [COVID-19]) vaccine , 2021/3/24 17:55 , Yuko Roblero)

## 2021-03-25 NOTE — PROGRESS NOTE ADULT - PROVIDER SPECIALTY LIST ADULT
Critical Care
Structural Heart
Heart Failure
CT Surgery
Heart Failure
Heart Failure

## 2021-03-25 NOTE — PROGRESS NOTE ADULT - SUBJECTIVE AND OBJECTIVE BOX
Patient seen and examined at bedside.    Overnight Events:  no events overnight.  Feels well.  ambulating without issue.    Review Of Systems: No chest pain, shortness of breath, or palpitations            Current Meds:  acetaminophen   Tablet .. 650 milliGRAM(s) Oral every 6 hours PRN  aspirin  chewable 81 milliGRAM(s) Oral daily  aspirin enteric coated 81 milliGRAM(s) Oral daily  atorvastatin 80 milliGRAM(s) Oral at bedtime  clopidogrel Tablet 75 milliGRAM(s) Oral daily  insulin lispro (ADMELOG) corrective regimen sliding scale   SubCutaneous Before meals and at bedtime  metoprolol succinate ER 25 milliGRAM(s) Oral two times a day  pantoprazole    Tablet 40 milliGRAM(s) Oral before breakfast  polyethylene glycol 3350 17 Gram(s) Oral daily  sacubitril 49 mG/valsartan 51 mG 1 Tablet(s) Oral two times a day  sodium chloride 0.9% lock flush 3 milliLiter(s) IV Push every 8 hours  spironolactone 12.5 milliGRAM(s) Oral <User Schedule>      Vitals:  T(F): 97.9 (03-25), Max: 98.3 (03-25)  HR: 71 (03-25) (64 - 71)  BP: 105/69 (03-25) (105/69 - 127/78)  RR: 18 (03-25)  SpO2: 98% (03-25)  I&O's Summary    24 Mar 2021 07:01  -  25 Mar 2021 07:00  --------------------------------------------------------  IN: 460 mL / OUT: 1850 mL / NET: -1390 mL    25 Mar 2021 07:01  -  25 Mar 2021 11:46  --------------------------------------------------------  IN: 240 mL / OUT: 400 mL / NET: -160 mL        Physical Exam:  Appearance: No acute distress; well appearing  Eyes: PERRL, EOMI, pink conjunctiva  HEENT: Normal oral mucosa  Cardiovascular: RRR, S1, S2, 1/6 holosystolic murmur, no rubs, or gallops; no edema; no JVD  Respiratory: Clear to auscultation bilaterally  Gastrointestinal: soft, non-tender, non-distended with normal bowel sounds  Musculoskeletal: No clubbing; no joint deformity   Neurologic: Non-focal  Lymphatic: No lymphadenopathy  Psychiatry: AAOx3, mood & affect appropriate  Skin: No rashes, ecchymoses, or cyanosis                          12.7   5.03  )-----------( 95       ( 25 Mar 2021 05:51 )             38.5     03-25    136  |  105  |  13  ----------------------------<  107<H>  4.1   |  22  |  0.74    Ca    9.0      25 Mar 2021 05:51  Phos  2.5     03-24  Mg     2.1     03-24    TPro  5.9<L>  /  Alb  3.9  /  TBili  0.7  /  DBili  x   /  AST  21  /  ALT  16  /  AlkPhos  48  03-24          Serum Pro-Brain Natriuretic Peptide: 487 pg/mL (03-23 @ 11:54)          Interpretation of Telemetry: NSR, rare PVCs

## 2021-03-25 NOTE — PROGRESS NOTE ADULT - ASSESSMENT
60M hx of ACC/AHA Stage C HF with NYHA Class I-II symptoms, ICM / HFrEF EF 25% s/p ICD 4/19, CAD s/p PCI 11/2018 to pLAD w/ residual  RCA/LCx, severe MR and DM2 here for elective mitral clip. 60M hx of ACC/AHA Stage C HF with NYHA Class I-II symptoms, ICM / HFrEF EF 25% s/p ICD 4/19, CAD s/p PCI 11/2018 to pLAD w/ residual  RCA/LCx, severe MR and DM2 here for elective mitral clip. Underwent clip successfully with post-procedural hypotension secondary to hypovolemia, improved.

## 2021-03-25 NOTE — DISCHARGE NOTE PROVIDER - NSDCCPCAREPLAN_GEN_ALL_CORE_FT
PRINCIPAL DISCHARGE DIAGNOSIS  Diagnosis: S/P mitral valve clip implantation  Assessment and Plan of Treatment: ASA plavix mitral clip  Toprol HR control  No MCOT>pt has AICD

## 2021-03-25 NOTE — DISCHARGE NOTE PROVIDER - NSDCPNSUBOBJ_GEN_ALL_CORE
Subjective:  "Happy to be released today"  OOB ambulating independently      Tele:  SR  70s           V/S:                    T(F): 97.9 (21 @ 11:00), Max: 98.3 (21 @ 05:00)  HR: 71 (21 @ 11:00) (64 - 71)  BP: 105/69 (21 @ 11:00) (105/69 - 127/78)  RR: 18 (21 @ 11:00) (18 - 189)  SpO2: 98% (21 @ 11:00) (98% - 100%)  Wt(kg): --      LV EF:      Labs:      136  |  105  |  13  ----------------------------<  107<H>  4.1   |  22  |  0.74    Ca    9.0      25 Mar 2021 05:51  Phos  2.5       Mg     2.1         TPro  5.9<L>  /  Alb  3.9  /  TBili  0.7  /  DBili  x   /  AST  21  /  ALT  16  /  AlkPhos  48                                 12.7   5.03  )-----------( 95       ( 25 Mar 2021 05:51 )             38.5             CAPILLARY BLOOD GLUCOSE      POCT Blood Glucose.: 177 mg/dL (25 Mar 2021 08:19)  POCT Blood Glucose.: 114 mg/dL (24 Mar 2021 21:41)  POCT Blood Glucose.: 187 mg/dL (24 Mar 2021 16:38)  POCT Blood Glucose.: 121 mg/dL (24 Mar 2021 11:14)          I&O's Detail    24 Mar 2021 07:01  -  25 Mar 2021 07:00  --------------------------------------------------------  IN:    Oral Fluid: 460 mL  Total IN: 460 mL    OUT:    Voided (mL): 1850 mL  Total OUT: 1850 mL    Total NET: -1390 mL      25 Mar 2021 07:01  -  25 Mar 2021 11:11  --------------------------------------------------------  IN:    Oral Fluid: 240 mL  Total IN: 240 mL    OUT:    Voided (mL): 400 mL  Total OUT: 400 mL    Total NET: -160 mL          BOWEL MOVEMENT:  [x ] YES [ ] NO      Daily     Daily Weight in k.6 (25 Mar 2021 07:12)  Medications:  acetaminophen   Tablet .. 650 milliGRAM(s) Oral every 6 hours PRN  aspirin  chewable 81 milliGRAM(s) Oral daily  aspirin enteric coated 81 milliGRAM(s) Oral daily  atorvastatin 80 milliGRAM(s) Oral at bedtime  clopidogrel Tablet 75 milliGRAM(s) Oral daily  insulin lispro (ADMELOG) corrective regimen sliding scale   SubCutaneous Before meals and at bedtime  metoprolol succinate ER 25 milliGRAM(s) Oral two times a day  pantoprazole    Tablet 40 milliGRAM(s) Oral before breakfast  polyethylene glycol 3350 17 Gram(s) Oral daily  sacubitril 49 mG/valsartan 51 mG 1 Tablet(s) Oral two times a day  sodium chloride 0.9% lock flush 3 milliLiter(s) IV Push every 8 hours  spironolactone 12.5 milliGRAM(s) Oral <User Schedule>        Physical Exam:    Appearance: No acute distress; well appearing    Cardiovascular: RRR, S1, S2, 1/6 holosystolic murmur, no rubs, or gallops;     Respiratory: Clear to auscultation bilaterally    Gastrointestinal: soft, non-tender, non-distended  + BM this am    groin: R groin incision intact, tender, no erythema                   PAST MEDICAL & SURGICAL HISTORY:  Chronic systolic heart failure    Type 2 diabetes mellitus    Nonrheumatic mitral valve regurgitation    Stented coronary artery    Coronary artery disease due to lipid rich plaque    Acute myocardial infarction    Ischemic cardiomyopathy with implantable cardioverter-defibrillator (ICD)    Hyperlipidemia    Cardiomyopathy    History of implantable cardioverter-defibrillator (ICD) insertion  V[employer]imo implanted 2019

## 2021-03-25 NOTE — DISCHARGE NOTE PROVIDER - PROVIDER TOKENS
PROVIDER:[TOKEN:[[id number]],SCHEDULEDAPPT:[03/30/2021],SCHEDULEDAPPTTIME:[01:30 PM]],PROVIDER:[TOKEN:[[id number]]]

## 2021-03-26 ENCOUNTER — NON-APPOINTMENT (OUTPATIENT)
Age: 61
End: 2021-03-26

## 2021-03-30 ENCOUNTER — APPOINTMENT (OUTPATIENT)
Dept: CARDIOTHORACIC SURGERY | Facility: CLINIC | Age: 61
End: 2021-03-30
Payer: COMMERCIAL

## 2021-03-31 ENCOUNTER — APPOINTMENT (OUTPATIENT)
Dept: CARDIOTHORACIC SURGERY | Facility: CLINIC | Age: 61
End: 2021-03-31
Payer: COMMERCIAL

## 2021-03-31 ENCOUNTER — APPOINTMENT (OUTPATIENT)
Dept: HEART FAILURE | Facility: CLINIC | Age: 61
End: 2021-03-31
Payer: COMMERCIAL

## 2021-03-31 VITALS
HEIGHT: 71 IN | HEART RATE: 67 BPM | WEIGHT: 178 LBS | OXYGEN SATURATION: 98 % | DIASTOLIC BLOOD PRESSURE: 62 MMHG | BODY MASS INDEX: 24.92 KG/M2 | TEMPERATURE: 97.9 F | RESPIRATION RATE: 14 BRPM | SYSTOLIC BLOOD PRESSURE: 97 MMHG

## 2021-03-31 VITALS
DIASTOLIC BLOOD PRESSURE: 64 MMHG | BODY MASS INDEX: 25.48 KG/M2 | WEIGHT: 182 LBS | SYSTOLIC BLOOD PRESSURE: 105 MMHG | TEMPERATURE: 98.1 F | HEIGHT: 71 IN | OXYGEN SATURATION: 99 % | HEART RATE: 70 BPM | RESPIRATION RATE: 16 BRPM

## 2021-03-31 DIAGNOSIS — Z95.818 OTHER SPECIFIED POSTPROCEDURAL STATES: ICD-10-CM

## 2021-03-31 DIAGNOSIS — I34.0 NONRHEUMATIC MITRAL (VALVE) INSUFFICIENCY: ICD-10-CM

## 2021-03-31 DIAGNOSIS — Z01.818 ENCOUNTER FOR OTHER PREPROCEDURAL EXAMINATION: ICD-10-CM

## 2021-03-31 DIAGNOSIS — Z98.890 OTHER SPECIFIED POSTPROCEDURAL STATES: ICD-10-CM

## 2021-03-31 PROCEDURE — 99024 POSTOP FOLLOW-UP VISIT: CPT

## 2021-03-31 PROCEDURE — 99072 ADDL SUPL MATRL&STAF TM PHE: CPT

## 2021-03-31 PROCEDURE — 99215 OFFICE O/P EST HI 40 MIN: CPT

## 2021-03-31 RX ORDER — CLOPIDOGREL BISULFATE 75 MG/1
75 TABLET, FILM COATED ORAL DAILY
Refills: 0 | Status: DISCONTINUED | COMMUNITY
Start: 2021-03-30 | End: 2021-03-31

## 2021-04-01 PROBLEM — I34.0 MITRAL REGURGITATION: Status: ACTIVE | Noted: 2021-03-21

## 2021-04-01 LAB
ALBUMIN SERPL ELPH-MCNC: 4.9 G/DL
ALP BLD-CCNC: 81 U/L
ALT SERPL-CCNC: 22 U/L
ANION GAP SERPL CALC-SCNC: 15 MMOL/L
AST SERPL-CCNC: 26 U/L
BILIRUB SERPL-MCNC: 0.6 MG/DL
BUN SERPL-MCNC: 20 MG/DL
CALCIUM SERPL-MCNC: 10.1 MG/DL
CHLORIDE SERPL-SCNC: 103 MMOL/L
CO2 SERPL-SCNC: 20 MMOL/L
CREAT SERPL-MCNC: 0.82 MG/DL
POTASSIUM SERPL-SCNC: 4.5 MMOL/L
PROT SERPL-MCNC: 7 G/DL
SODIUM SERPL-SCNC: 138 MMOL/L

## 2021-04-02 NOTE — PHYSICAL EXAM
[General Appearance - Well Developed] : well developed [Well Groomed] : well groomed [General Appearance - Well Nourished] : well nourished [General Appearance - In No Acute Distress] : no acute distress [Eyelids - No Xanthelasma] : the eyelids demonstrated no xanthelasmas [] : no respiratory distress [Respiration, Rhythm And Depth] : normal respiratory rhythm and effort [Auscultation Breath Sounds / Voice Sounds] : lungs were clear to auscultation bilaterally [Heart Rate And Rhythm] : heart rate and rhythm were normal [Heart Sounds] : normal S1 and S2 [Arterial Pulses Normal] : the arterial pulses were normal [Edema] : no peripheral edema present [Bowel Sounds] : normal bowel sounds [Abdomen Soft] : soft [Abdomen Tenderness] : non-tender [Abnormal Walk] : normal gait [Nail Clubbing] : no clubbing of the fingernails [Skin Color & Pigmentation] : normal skin color and pigmentation [Skin Turgor] : normal skin turgor [No Venous Stasis] : no venous stasis [Oriented To Time, Place, And Person] : oriented to person, place, and time [Impaired Insight] : insight and judgment were intact [Mood] : the mood was normal [Memory Recent] : recent memory was not impaired [Murmurs] : no murmurs present [FreeTextEntry1] : warm peripherally

## 2021-04-02 NOTE — ADDENDUM
[FreeTextEntry1] : Patient seen and examined with GAYLE Jay NP. Agree with note as outlined above. \par AI Gupta

## 2021-04-02 NOTE — ASSESSMENT
[FreeTextEntry1] : Mr. Jefferson is a 62 yo M with an ischemic cardiomyopathy, HFrEF (EF 15-20% improved to 25% with LVEDD 6.9 cm) s/p [place] Sci ICD 4/19, CAD s/p Impella-Assisted PCI on 11/20/18 w/ 1 stent to pLAD w/ residual  of RCA/LCx, mod-severe MR now status post MitraClip 3/22/21 with improvement in MR, and type 2 DM, who presents today for scheduled follow-up.\par \par He is ACC/AHA Stage C HF, NYHA Class I symptoms. He is euvolemic on exam and low normotensive, currently tolerating low dose GDMT. \par \par 1. Ischemic cardiomyopathy HFrEF-\par - GDMT: Currently taking Entreso 24-26 mg BID, Toprol-XL 25 mg BID and Spironolactone 12.5 mg QOD. Advised to increase Toprol-XL to 50 mg BID. Will plan to slowly uptitrate Entresto to 49-51 mg BID as tolerated via telephone (previously tolerated moderate dose but uptitration to full dose was limited by marginal BP).\par - Maintaining euvolemia without need of loop diuretics\par - Device: Single chamber ICD. Most recent interrogation 3/4/21 demonstrated appropriate functioning and no AT/AF events.\par - Labs today with normal renal and hepatic serologies with electrolytes within normal range.\par \par 2. Mitral Regurgitation s/p MitraClip 3/22/21:\par - Echo 3/23 post procedure demonstrated mild-moderate MR\par - Saw Dr. Jarquin this morning for post op visit, plan for TTE in one month\par \par 3. CAD s/p PCI-LAD with  of RCA/LCx:\par - No s/s of active ischemia\par - Continue DAPT, BB and ARB (in Entresto) \par - Continue high intensity statin therapy as prescribed\par \par RTC 4/26 with Dr. Triana as previously scheduled.

## 2021-04-02 NOTE — HISTORY OF PRESENT ILLNESS
[FreeTextEntry1] : Mr. Jefferson is a 60 y/o M with ICM/HFrEF (EF initially 15-20%, LVEDD 6.5 cm; improved to 25%) s/p [place] Sci ICD 4/19, CAD s/p Impella-Assisted PCI on 11/20/18 w/ 1 stent to pLAD w/ residual  of RCA/LCx, mod-severe MR s/p Jessica Clip 3/22/21, and T2DM (A1c 6.3% 6/22/20), who presents for scheduled follow-up. \par \par He had a cardiac viability study on 3/26/20 which showed large moderate-severe defects in basal anterolateral, inferior, inferolateral and inferoapical walls that were fixed c/w infarct but had some viability in mid-anterolateral and distal lateral walls as well as preserved perfusion on rest of anterior, septal and apical walls. Based on this, did not appear revascularization of RCA/LCx would be indicated. \par \par To further evaluate his exertional capacity, he underwent a CPET (full results below) on 8/11/20 with peak VO2 of 14.4 (43% predicted) and VO2 at AT of 4.6 (<40% predicted) with VE/VCO2 of 35 consistent with impaired cardiopulmonary status with possible element of deconditioning so he was referred to cardiac rehab however he did not pursue. \par \par He presents today status post MitraClip on 3/22 which he tolerated well without significant complications. He was discharged home on 3/25 and reports feeling well since then. He notes that he feels more energetic. He is able to walk unlimited distance of flat ground and can climb 1 a flight of stairs without symptoms. His weight has remained stable (home weight today 178 lbs). His appetite remains good. He drinks up to 100 oz of fluid daily and has not needed to use diuretics Home blood pressure was initially 110/68, but has been lower the past two days 94/70.\par \par He denies chest discomfort, palpitations, SOB, dizziness/LH, lower extremity edema, abdominal bloating, orthopnea and PND. He is taking his medications as prescribed. His ICD has not fired.

## 2021-04-26 ENCOUNTER — APPOINTMENT (OUTPATIENT)
Dept: HEART FAILURE | Facility: CLINIC | Age: 61
End: 2021-04-26
Payer: COMMERCIAL

## 2021-04-26 ENCOUNTER — NON-APPOINTMENT (OUTPATIENT)
Age: 61
End: 2021-04-26

## 2021-04-26 VITALS
OXYGEN SATURATION: 99 % | WEIGHT: 184 LBS | HEIGHT: 71 IN | BODY MASS INDEX: 25.76 KG/M2 | TEMPERATURE: 97.7 F | DIASTOLIC BLOOD PRESSURE: 56 MMHG | SYSTOLIC BLOOD PRESSURE: 93 MMHG | HEART RATE: 73 BPM | RESPIRATION RATE: 16 BRPM

## 2021-04-26 PROCEDURE — 93000 ELECTROCARDIOGRAM COMPLETE: CPT

## 2021-04-26 PROCEDURE — 99072 ADDL SUPL MATRL&STAF TM PHE: CPT

## 2021-04-26 PROCEDURE — 99215 OFFICE O/P EST HI 40 MIN: CPT

## 2021-04-26 RX ORDER — CLOPIDOGREL BISULFATE 75 MG/1
75 TABLET, FILM COATED ORAL DAILY
Qty: 30 | Refills: 0 | Status: DISCONTINUED | COMMUNITY
Start: 2021-03-31 | End: 2021-04-26

## 2021-04-27 LAB
ALBUMIN SERPL ELPH-MCNC: 4.7 G/DL
ALP BLD-CCNC: 85 U/L
ALT SERPL-CCNC: 27 U/L
ANION GAP SERPL CALC-SCNC: 10 MMOL/L
AST SERPL-CCNC: 35 U/L
BILIRUB SERPL-MCNC: 0.5 MG/DL
BUN SERPL-MCNC: 16 MG/DL
CALCIUM SERPL-MCNC: 9.6 MG/DL
CHLORIDE SERPL-SCNC: 104 MMOL/L
CO2 SERPL-SCNC: 23 MMOL/L
CREAT SERPL-MCNC: 0.91 MG/DL
GLUCOSE SERPL-MCNC: 104 MG/DL
NT-PROBNP SERPL-MCNC: 299 PG/ML
POTASSIUM SERPL-SCNC: 4.8 MMOL/L
PROT SERPL-MCNC: 6.8 G/DL
SODIUM SERPL-SCNC: 138 MMOL/L

## 2021-04-27 NOTE — HISTORY OF PRESENT ILLNESS
[FreeTextEntry1] : HOME RAMIREZ is a 61 year old male here on 05/13/2021, 7.5 weeks after undergoing a placemetn of MitraClip on 3/22/2021 for severe MR. Post-operative course was uneventful.  He comes to the office today with a new TTE reporting feeling _____ overall.  He currently denies fever, chills, cough, palpitations, angina, dyspnea, dizziness, lightheadedness, syncope, or peripheral edema. His energy level is and appetite is good. Denies bowel or bladder problems. \par \par \par NYHA\par PCP:  \par CARD: Dr. Grayson Manuel\par HF: Dr. Sravan Triana\par \par ***NOTE INCOMPLETE***\par

## 2021-04-27 NOTE — PHYSICAL EXAM
[General Appearance - Well Developed] : well developed [Well Groomed] : well groomed [General Appearance - Well Nourished] : well nourished [General Appearance - In No Acute Distress] : no acute distress [Eyelids - No Xanthelasma] : the eyelids demonstrated no xanthelasmas [] : no respiratory distress [Respiration, Rhythm And Depth] : normal respiratory rhythm and effort [Auscultation Breath Sounds / Voice Sounds] : lungs were clear to auscultation bilaterally [Heart Rate And Rhythm] : heart rate and rhythm were normal [Murmurs] : no murmurs present [Heart Sounds] : normal S1 and S2 [Arterial Pulses Normal] : the arterial pulses were normal [Edema] : no peripheral edema present [Bowel Sounds] : normal bowel sounds [Abdomen Soft] : soft [Abdomen Tenderness] : non-tender [Abnormal Walk] : normal gait [Nail Clubbing] : no clubbing of the fingernails [Skin Color & Pigmentation] : normal skin color and pigmentation [Skin Turgor] : normal skin turgor [No Venous Stasis] : no venous stasis [Oriented To Time, Place, And Person] : oriented to person, place, and time [Impaired Insight] : insight and judgment were intact [Mood] : the mood was normal [Memory Recent] : recent memory was not impaired [FreeTextEntry1] : warm peripherally

## 2021-04-27 NOTE — ASSESSMENT
[FreeTextEntry1] : Mr. Jefferson is a 62 yo M with an ICM/HFrEF (EF 15-20% improved to 25% with LVEDD 6.9 cm) s/p [place] Sci ICD 4/19, CAD s/p Impella-Assisted PCI on 11/20/18 w/ 1 stent to pLAD w/ residual  of RCA/LCx, mod-severe MR now status post MitraClip 3/22/21 with improvement in MR, NIDDM (A1c 6.3) who presents today for scheduled follow-up.\par \par He is ACC/AHA Stage C HF, NYHA Class I symptoms. He is euvolemic on exam and low normotensive, currently tolerating GDMT. \par \par 1. Ischemic cardiomyopathy HFrEF-\par - GDMT: c/w Entreso 49/51 mg BID, Toprol-XL 50 mg BID and Spironolactone 12.5 mg QOD\par - Maintaining euvolemia without need of loop diuretics; on contrary is often hypovolemic\par - Device: Single chamber ICD\par - Labs today with normal renal and hepatic serologies with electrolytes within normal range.\par \par 2. Mitral Regurgitation s/p MitraClip 3/22/21:\par - Echo 3/23 post procedure demonstrated mild-moderate MR\par - d/c plavix as >1 month from clip\par - repeat TTE in 2 months\par \par 3. CAD s/p PCI-LAD with  of RCA/LCx:\par - No s/s of active ischemia\par - Continue ASA, BB and ARB (in Entresto) \par - Continue high intensity statin therapy as prescribed\par \par RTC 3 months with NP, 6 months with me

## 2021-04-27 NOTE — REVIEW OF SYSTEMS
[Fever] : no fever [Chills] : no chills [Feeling Fatigued] : not feeling fatigued [SOB] : no shortness of breath [Dyspnea on exertion] : not dyspnea during exertion [Chest Discomfort] : no chest discomfort [Lower Ext Edema] : no extremity edema [Palpitations] : no palpitations [Syncope] : no syncope [Cough] : no cough [Change in Appetite] : no change in appetite [Dizziness] : no dizziness [Negative] : Heme/Lymph

## 2021-04-27 NOTE — HISTORY OF PRESENT ILLNESS
[FreeTextEntry1] : Mr. Jefferson is a 62 y/o M with ICM/HFrEF (EF initially 15-20%, LVEDD 6.5 cm; improved to 25%) s/p [place] Sci ICD 4/19, CAD s/p Impella-Assisted PCI on 11/20/18 w/ 1 stent to pLAD w/ residual  of RCA/LCx, mod-severe MR s/p Mitral Clip 3/22/21, and T2DM (A1c 6.3% 6/22/20), who presents for scheduled follow-up. \par \par At last visit, he had been doing well post-clip 3/22. Continues to feel energetic. He is able to walk unlimited distance of flat ground and can climb 1 a flight of stairs without symptoms. Able to run after his dog without issues. His weight has remained stable 178-180. BP at home ranging . His appetite remains good. He drinks up to 100 oz of fluid daily and has not needed to use diuretics. \par \par He denies chest discomfort, palpitations, SOB, dizziness/LH, lower extremity edema, abdominal bloating, orthopnea and PND. He is taking his medications as prescribed. His ICD has not fired.\par \par

## 2021-04-27 NOTE — REASON FOR VISIT
[Structural Heart and Valve Disease] : structural heart and valve disease [FreeTextEntry1] : 3/22/2021: MitraClip implantation using commercial implant NCT [id number]

## 2021-04-27 NOTE — CARDIOLOGY SUMMARY
[de-identified] : \par 4/26/21 - NSR, nl axis, HR 71, low voltage limb leads; inferior and anterior Q waves; PRWP\par 10/05/20 - NSR, anterior Q waves (old), inf Q waves, PRWP\par 06/22/20 NSR V paced HR 62 \par 03/09/20 - NSR, HR 60, anterior Q waves (old), inferior Q waves, PRWP\par 11/18/19 - sinus, HR 60, PRWP, Q waves anteriorly (old)\par 01/02/19: SB at 59 bpm.\par 11/29/18: NSR, NSST [de-identified] : \par CPET 08/11/20 - 10:53 min; 4.1 METS; peak VO2 14.4 (43% predicted); VO2 at AT 4.6 ml/kg/min (<40% predicted); O2/pulse was low with VO2/work slope of 8.1 (reduced). VE/VCO2 35; OUES 0.9 L/min (reduced) c/w Peres class C cardiac failure with possible element of decondiitoning\par \par 03/26/20 - viability study - mod-severe defects in basal anterolateral, inferior, inferolateral and inferoapical walls fixed c/w infarct; 24 hour redistribution of mid-anterolateral and distal lateral walls c/w viability\par anterior, septal and apical walls with preserved perfusion c/w viable myocardium; EF 19%\par \par 11/18 - The left ventricle was enlarged. The anterior, anteroseptal, anterolateral, apical, and mid to distal inferoseptal walls demonstrate mostly preserved perfusion on rest imaging suggestive of viable myocardium. There is a moderate to severe defect in the distal inferior and distal inferolateral walls that is mostly fixed on delay imaging suggestive of mild to moderate viability. There is a small, severe defect in the basal inferoseptum that demonstrates improved perfsuion on delay imaging suggestive of mild to moderate residual viability. There are severe defects in the basal to mid inferior and basal to mid inferolateral walls that are mostly fixed on delay imaging suggestive of infarct with minimal residual viability. [de-identified] : \par 3/23/21 s/p rossana clip: LVIDd 6.6cm , normal RV size and function, LVEF 20%, severe LVE with severe global LV systolic dysfunction, mild-moderate MR - mean gradient 2-3mmHg. \par \par 10/20/20 LVEDD 6.9 cm, EF 27% with inferior wall AK, moderate DD, normal RV size and function, mildly dilated LA, mod-severe eccentric MR, mild TR, est RVSP 27 mmHg\par \par 12/9/19 LVEDD 6.5 EF 25% mod-severe eccentric MR, inferior wall AK\par \par 06/03/19 - LVEDD 5.9 cm, EF 28% (mild improvement from prior), mod-severe MR, inferior wall AK\par \par 02/27/19 - LVEDD 6.7 cm, EF 25% (read as 31% but incorrect), severe MR with inferior wall HK/akinesis\par \par 11/14/18 TTE LVEF 15% with severe global LV systolic dysfunction\par \par 11/16/18 VERN  LVEF 16% with severe global LVSD, severe stage III diastolic dysfunction, severe MR, severe LA & LV enlargement [de-identified] : \par 11/20/18 - Impella-assisted PCI of pLAD \par \par 11/15/18 C pLAD 90%, mCx 100%, pRCA 100%

## 2021-04-27 NOTE — CARDIOLOGY SUMMARY
[de-identified] : 5/13/2021 DONE TODAY, RESULTS PENDING.\par 3/23/2021  Patient status-post MitraClip placement. MitraClip appears well seated. Mild-moderate mitral regurgitation. Mean transmitral valve gradient equals 2-3 mm Hg. (HR about 60 bpm) Normal trileaflet aortic valve. No aortic valve regurgitation seen. Severe global left ventricular systolic dysfunction with regional variation. Normal right ventricular size and function. A device wire is noted in the right heart. *** Compared with echocardiogram of 3/22/2021, results are similar to post-procedure findings.  oriented to person, place and time [de-identified] : 3/25/2020 CARDIAC VIABILITY  The left ventricle was enlarged. There are large, moderate to severe defects in basal anterolateral, inferior inferolateral, and inferoapical walls that are fixed on 24 hour delay images consistent with infarct. There is 24 hour redistribution of the mid anterolateral and distal  lateral walls consistent with evidence of viability. The anterior, septal, and apical walls demonstrate preserved perfusion on rest imaging suggestive of viable myocardium. Post-stress gated wall motion analysis was performed (LVEF = 19 %;LVEDV = 237 ml.) revealing LV dilatation with severe LV systolic dysfunction. The inferior, inferolatateral, and lateral walls are akinetic. *** Compared with Nuclear/Stress test of 11/19/2018, results are similar on today's study.\par  [de-identified] : 4/17/2019: [employer] D433 RESONATE EL ICD. [de-identified] : 2/16/2021 The coronary circulation is right dominant. Mid LM: tubular 20% stenosis. Proximal LAD: tubular 30% stenosis. Mid LAD: 0% stenosis at the site of a prior stent. In a second lesion, there was a tubular 20% stenosis.  D1: tubular 20% stenosis. Proximal circumflex: diffuse 60% stenosis. OM1: 100% stenosis. Proximal RCA: 100% stenosis. \par 11/20/2018 JUSTYN ROSHNI to pLAD (required IMPELLA) [de-identified] : 3/19/2021 CAROTID DUPLEX Calcified plaque in the right internal carotid artery creating a hemodynamically significant stenosis. The degree of luminal narrowing is estimated at 50-69% by diameter. Calcified plaque in the left internal carotid artery without duplex evidence of hemodynamically significant stenosis.\par

## 2021-05-03 ENCOUNTER — APPOINTMENT (OUTPATIENT)
Dept: ELECTROPHYSIOLOGY | Facility: CLINIC | Age: 61
End: 2021-05-03

## 2021-05-05 ENCOUNTER — APPOINTMENT (OUTPATIENT)
Dept: ELECTROPHYSIOLOGY | Facility: CLINIC | Age: 61
End: 2021-05-05
Payer: MEDICARE

## 2021-05-05 ENCOUNTER — NON-APPOINTMENT (OUTPATIENT)
Age: 61
End: 2021-05-05

## 2021-05-05 PROCEDURE — 93295 DEV INTERROG REMOTE 1/2/MLT: CPT

## 2021-05-05 PROCEDURE — 93296 REM INTERROG EVL PM/IDS: CPT

## 2021-05-13 ENCOUNTER — APPOINTMENT (OUTPATIENT)
Dept: CARDIOLOGY | Facility: CLINIC | Age: 61
End: 2021-05-13

## 2021-05-13 ENCOUNTER — NON-APPOINTMENT (OUTPATIENT)
Age: 61
End: 2021-05-13

## 2021-06-21 ENCOUNTER — APPOINTMENT (OUTPATIENT)
Dept: CV DIAGNOSITCS | Facility: HOSPITAL | Age: 61
End: 2021-06-21

## 2021-06-21 ENCOUNTER — OUTPATIENT (OUTPATIENT)
Dept: OUTPATIENT SERVICES | Facility: HOSPITAL | Age: 61
LOS: 1 days | End: 2021-06-21
Payer: MEDICARE

## 2021-06-21 DIAGNOSIS — I50.22 CHRONIC SYSTOLIC (CONGESTIVE) HEART FAILURE: ICD-10-CM

## 2021-06-21 DIAGNOSIS — Z95.810 PRESENCE OF AUTOMATIC (IMPLANTABLE) CARDIAC DEFIBRILLATOR: Chronic | ICD-10-CM

## 2021-06-21 PROCEDURE — 93306 TTE W/DOPPLER COMPLETE: CPT

## 2021-06-21 PROCEDURE — 93306 TTE W/DOPPLER COMPLETE: CPT | Mod: 26

## 2021-07-20 ENCOUNTER — APPOINTMENT (OUTPATIENT)
Dept: HEART FAILURE | Facility: CLINIC | Age: 61
End: 2021-07-20
Payer: MEDICARE

## 2021-07-20 VITALS
HEIGHT: 71 IN | RESPIRATION RATE: 16 BRPM | WEIGHT: 190 LBS | BODY MASS INDEX: 26.6 KG/M2 | HEART RATE: 69 BPM | SYSTOLIC BLOOD PRESSURE: 93 MMHG | DIASTOLIC BLOOD PRESSURE: 57 MMHG | TEMPERATURE: 98.1 F | OXYGEN SATURATION: 98 %

## 2021-07-20 DIAGNOSIS — I11.0 HYPERTENSIVE HEART DISEASE WITH HEART FAILURE: ICD-10-CM

## 2021-07-20 PROCEDURE — 99214 OFFICE O/P EST MOD 30 MIN: CPT

## 2021-07-20 PROCEDURE — 99072 ADDL SUPL MATRL&STAF TM PHE: CPT

## 2021-07-20 NOTE — PHYSICAL EXAM
[General Appearance - Well Developed] : well developed [Well Groomed] : well groomed [General Appearance - Well Nourished] : well nourished [General Appearance - In No Acute Distress] : no acute distress [Eyelids - No Xanthelasma] : the eyelids demonstrated no xanthelasmas [] : no respiratory distress [Respiration, Rhythm And Depth] : normal respiratory rhythm and effort [Auscultation Breath Sounds / Voice Sounds] : lungs were clear to auscultation bilaterally [Heart Rate And Rhythm] : heart rate and rhythm were normal [Heart Sounds] : normal S1 and S2 [Murmurs] : no murmurs present [Arterial Pulses Normal] : the arterial pulses were normal [Edema] : no peripheral edema present [Bowel Sounds] : normal bowel sounds [Abdomen Soft] : soft [Abdomen Tenderness] : non-tender [Abnormal Walk] : normal gait [Nail Clubbing] : no clubbing of the fingernails [FreeTextEntry1] : warm peripherally [Skin Color & Pigmentation] : normal skin color and pigmentation [Skin Turgor] : normal skin turgor [No Venous Stasis] : no venous stasis [Oriented To Time, Place, And Person] : oriented to person, place, and time [Impaired Insight] : insight and judgment were intact [Mood] : the mood was normal [Memory Recent] : recent memory was not impaired

## 2021-07-20 NOTE — HISTORY OF PRESENT ILLNESS
[FreeTextEntry1] : Mr. Jefferson is a 62 y/o M with ICM/HFrEF (EF initially 15-20%, LVEDD 6.5 cm; improved to 25%) s/p [place] [employer] ICD 4/19, CAD s/p Impella-Assisted PCI on 11/20/18 w/ 1 stent to pLAD w/ residual  of RCA/LCx, mod-severe MR s/p Mitral Clip 3/22/21, and T2DM (A1c 6.3% 6/22/20), who presents for routine followup of his cardiomyopathy.\par \par Since last visit in April he continues to do well. He feels unlimited on flat ground and exercises for 1hr 3-4x a week on a treadmill at a speed of 3.8. He does not have troubles with stairs on inclines. Home BP ranges /50s and HR 60s. Reports weight increase in past two months, approx 5 lbs but attributes this to consuming more calories due to good appetite. Home weight today 188 lbs. He denies chest discomfort, palpitations, SOB, dizziness/LH, lower extremity edema, abdominal bloating, orthopnea and PND. He is taking his medications as prescribed. His ICD has not fired. Reports have routine labs last week with his PCP. \par \par

## 2021-07-20 NOTE — CARDIOLOGY SUMMARY
[de-identified] : \par 4/26/21 - NSR, nl axis, HR 71, low voltage limb leads; inferior and anterior Q waves; PRWP\par 10/05/20 - NSR, anterior Q waves (old), inf Q waves, PRWP\par 06/22/20 NSR V paced HR 62 \par 03/09/20 - NSR, HR 60, anterior Q waves (old), inferior Q waves, PRWP\par 11/18/19 - sinus, HR 60, PRWP, Q waves anteriorly (old)\par 01/02/19: SB at 59 bpm.\par 11/29/18: NSR, NSST [de-identified] : \par CPET 08/11/20 - 10:53 min; 4.1 METS; peak VO2 14.4 (43% predicted); VO2 at AT 4.6 ml/kg/min (<40% predicted); O2/pulse was low with VO2/work slope of 8.1 (reduced). VE/VCO2 35; OUES 0.9 L/min (reduced) c/w Peres class C cardiac failure with possible element of decondiitoning\par \par 03/26/20 - viability study - mod-severe defects in basal anterolateral, inferior, inferolateral and inferoapical walls fixed c/w infarct; 24 hour redistribution of mid-anterolateral and distal lateral walls c/w viability\par anterior, septal and apical walls with preserved perfusion c/w viable myocardium; EF 19%\par \par 11/18 - The left ventricle was enlarged. The anterior, anteroseptal, anterolateral, apical, and mid to distal inferoseptal walls demonstrate mostly preserved perfusion on rest imaging suggestive of viable myocardium. There is a moderate to severe defect in the distal inferior and distal inferolateral walls that is mostly fixed on delay imaging suggestive of mild to moderate viability. There is a small, severe defect in the basal inferoseptum that demonstrates improved perfsuion on delay imaging suggestive of mild to moderate residual viability. There are severe defects in the basal to mid inferior and basal to mid inferolateral walls that are mostly fixed on delay imaging suggestive of infarct with minimal residual viability. [de-identified] : \par 06/21/21 TTE: LVIDd 6.3 cm, LVEF 30%, mod DD, normal RV size and function, mildly dilated LA, MitraClip well seated with mild-mod MR (mean gradient 3 mmHg with HR of 88 bpm), min TR. \par \par 3/23/21 s/p rossana clip: LVIDd 6.6cm , normal RV size and function, LVEF 20%, severe LVE with severe global LV systolic dysfunction, mild-moderate MR - mean gradient 2-3mmHg. \par \par 10/20/20 LVEDD 6.9 cm, EF 27% with inferior wall AK, moderate DD, normal RV size and function, mildly dilated LA, mod-severe eccentric MR, mild TR, est RVSP 27 mmHg\par \par 12/9/19 LVEDD 6.5 EF 25% mod-severe eccentric MR, inferior wall AK\par \par 06/03/19 - LVEDD 5.9 cm, EF 28% (mild improvement from prior), mod-severe MR, inferior wall AK\par \par 02/27/19 - LVEDD 6.7 cm, EF 25% (read as 31% but incorrect), severe MR with inferior wall HK/akinesis\par \par 11/14/18 TTE LVEF 15% with severe global LV systolic dysfunction\par \par 11/16/18 VERN  LVEF 16% with severe global LVSD, severe stage III diastolic dysfunction, severe MR, severe LA & LV enlargement [de-identified] : \par 11/20/18 - Impella-assisted PCI of pLAD \par \par 11/15/18 C pLAD 90%, mCx 100%, pRCA 100%

## 2021-07-20 NOTE — ASSESSMENT
[FreeTextEntry1] : Mr. Jefferson is a 62 yo M with an ICM/HFrEF (EF 15-20% improved to 30% with LVEDD 6.9 cm) s/p [place] [employer] ICD 4/19, CAD s/p Impella-Assisted PCI on 11/20/18 w/ 1 stent to pLAD w/ residual  of RCA/LCx, mod-severe MR now status post MitraClip 3/22/21 with improvement in MR, NIDDM (A1c 6.3) who presents today for scheduled follow-up.\par \par He is ACC/AHA Stage C HF, NYHA Class I symptoms. He is euvolemic on exam and low normotensive. \par \par 1. Ischemic cardiomyopathy HFrEF-\par - GDMT: will continue with current regimen of Entreso 49/51 mg BID, Toprol-XL 50 mg BID and Spironolactone 12.5 mg QOD. Further uptitrated limited by marginal BP and HR of 60s with single chamber ICD. \par - Maintaining euvolemia without need of loop diuretics; on contrary is often hypovolemic\par - Device: Single chamber ICD\par - Labs: had routine labs with PCP last week, office contacted to fax results \par \par 2. Mitral Regurgitation s/p MitraClip 3/22/21:\par - Echo 3/23 post procedure demonstrated mild-moderate MR\par - d/c plavix as >1 month from clip\par - TTE from 6/21/21 with mild-mod MR and mean gradient of 3 mmHg\par \par 3. CAD s/p PCI-LAD with  of RCA/LCx:\par - No s/s of active ischemia\par - Continue ASA, BB and ARB (in Entresto) \par - Continue high intensity statin therapy as prescribed\par \par RTC 3 months with Dr. Triana

## 2021-08-04 ENCOUNTER — APPOINTMENT (OUTPATIENT)
Dept: ELECTROPHYSIOLOGY | Facility: CLINIC | Age: 61
End: 2021-08-04
Payer: MEDICARE

## 2021-08-04 ENCOUNTER — NON-APPOINTMENT (OUTPATIENT)
Age: 61
End: 2021-08-04

## 2021-08-04 PROCEDURE — 93296 REM INTERROG EVL PM/IDS: CPT

## 2021-08-04 PROCEDURE — 93295 DEV INTERROG REMOTE 1/2/MLT: CPT

## 2021-10-25 ENCOUNTER — NON-APPOINTMENT (OUTPATIENT)
Age: 61
End: 2021-10-25

## 2021-10-25 ENCOUNTER — APPOINTMENT (OUTPATIENT)
Dept: HEART FAILURE | Facility: CLINIC | Age: 61
End: 2021-10-25
Payer: MEDICARE

## 2021-10-25 VITALS
SYSTOLIC BLOOD PRESSURE: 93 MMHG | HEIGHT: 71 IN | TEMPERATURE: 98.2 F | BODY MASS INDEX: 26.6 KG/M2 | WEIGHT: 190 LBS | OXYGEN SATURATION: 100 % | RESPIRATION RATE: 16 BRPM | DIASTOLIC BLOOD PRESSURE: 57 MMHG | HEART RATE: 68 BPM

## 2021-10-25 PROCEDURE — 99215 OFFICE O/P EST HI 40 MIN: CPT

## 2021-10-25 PROCEDURE — 93000 ELECTROCARDIOGRAM COMPLETE: CPT

## 2021-10-25 NOTE — ASSESSMENT
[FreeTextEntry1] : Mr. Jefferson is a 60 yo M with an ICM/HFrEF (EF 15-20% improved to 30% with LVEDD 6.9 cm) s/p [place] [employer] ICD 4/19, CAD s/p Impella-Assisted PCI on 11/20/18 w/ 1 stent to pLAD w/ residual  of RCA/LCx, mod-severe MR now status post MitraClip 3/22/21 with improvement in MR, NIDDM (A1c 6.3) who presents today for scheduled follow-up.\par \par He is ACC/AHA Stage C HF, NYHA Class I symptoms. He is euvolemic on exam and low normotensive. \par \par 1. Ischemic cardiomyopathy HFrEF-\par - GDMT: will continue with current regimen of Entresto 49/51 mg BID, Toprol-XL 50 mg BID and Spironolactone 12.5 mg QOD. Further uptitrated limited by marginal BP and HR of 60s with single chamber ICD. \par - Maintaining euvolemia without need of loop diuretics; on the contrary is often hypovolemic\par - Device: Single chamber ICD\par \par 2. Mitral Regurgitation s/p MitraClip 3/22/21:\par - Echo 3/23 post procedure demonstrated mild-moderate MR\par - TTE from 6/21/21 with mild-mod MR and mean gradient of 3 mmHg\par \par 3. CAD s/p PCI-LAD with  of RCA/LCx:\par - No s/s of active ischemia\par - Continue ASA, BB and ARB (in Entresto) \par - Continue high intensity statin therapy as prescribed\par \par RTC 6 months with me

## 2021-10-25 NOTE — PHYSICAL EXAM
[General Appearance - Well Developed] : well developed [Well Groomed] : well groomed [General Appearance - Well Nourished] : well nourished [General Appearance - In No Acute Distress] : no acute distress [Eyelids - No Xanthelasma] : the eyelids demonstrated no xanthelasmas [] : no respiratory distress [Respiration, Rhythm And Depth] : normal respiratory rhythm and effort [Auscultation Breath Sounds / Voice Sounds] : lungs were clear to auscultation bilaterally [Heart Rate And Rhythm] : heart rate and rhythm were normal [Heart Sounds] : normal S1 and S2 [Murmurs] : no murmurs present [Edema] : no peripheral edema present [Arterial Pulses Normal] : the arterial pulses were normal [Bowel Sounds] : normal bowel sounds [Abdomen Soft] : soft [Abdomen Tenderness] : non-tender [Abnormal Walk] : normal gait [Nail Clubbing] : no clubbing of the fingernails [Skin Color & Pigmentation] : normal skin color and pigmentation [Skin Turgor] : normal skin turgor [No Venous Stasis] : no venous stasis [Oriented To Time, Place, And Person] : oriented to person, place, and time [Mood] : the mood was normal [Impaired Insight] : insight and judgment were intact [Memory Recent] : recent memory was not impaired [FreeTextEntry1] : warm peripherally

## 2021-10-25 NOTE — HISTORY OF PRESENT ILLNESS
[FreeTextEntry1] : Mr. Jefferson is a 62 y/o M with ICM/HFrEF (EF initially 15-20%, LVEDD 6.5 cm; improved to 25%) s/p [place] [employer] ICD 4/19, CAD s/p Impella-Assisted PCI on 11/20/18 w/ 1 stent to pLAD w/ residual  of RCA/LCx, mod-severe MR s/p Mitral Clip 3/22/21, and T2DM (A1c 6.3% 6/22/20), who presents for routine followup of his cardiomyopathy.\par \anna Since last visit has been doing well (improved since clip). He feels unlimited on flat ground and exercises for 1hr 4-5x a week on a treadmill at a speed of 4.2 (increased from prior). He does not have troubles with stairs on inclines. Home BP ranges /50s and HR 60s. Reports weight has been fairly stable (188 pounds). He denies chest discomfort, palpitations, SOB, lower extremity edema, abdominal bloating, orthopnea and PND. He is taking his medications as prescribed. His ICD has not fired. \par \anna Denies bendopnea. Has occasional lightheadedness with positional changes but denies any syncope. \par gino Received Covid vaccine (J&J in March).

## 2021-10-27 LAB
ALBUMIN SERPL ELPH-MCNC: 4.9 G/DL
ALP BLD-CCNC: 66 U/L
ALT SERPL-CCNC: 17 U/L
ANION GAP SERPL CALC-SCNC: 21 MMOL/L
AST SERPL-CCNC: 26 U/L
BILIRUB SERPL-MCNC: 0.6 MG/DL
BUN SERPL-MCNC: 17 MG/DL
CALCIUM SERPL-MCNC: 10 MG/DL
CHLORIDE SERPL-SCNC: 106 MMOL/L
CHOLEST SERPL-MCNC: 120 MG/DL
CO2 SERPL-SCNC: 16 MMOL/L
CREAT SERPL-MCNC: 0.96 MG/DL
GLUCOSE SERPL-MCNC: 116 MG/DL
HDLC SERPL-MCNC: 55 MG/DL
LDLC SERPL CALC-MCNC: 52 MG/DL
NONHDLC SERPL-MCNC: 65 MG/DL
NT-PROBNP SERPL-MCNC: 334 PG/ML
POTASSIUM SERPL-SCNC: 4.8 MMOL/L
PROT SERPL-MCNC: 6.9 G/DL
SODIUM SERPL-SCNC: 144 MMOL/L
TRIGL SERPL-MCNC: 67 MG/DL
TSH SERPL-ACNC: 1.97 UIU/ML

## 2021-11-03 ENCOUNTER — NON-APPOINTMENT (OUTPATIENT)
Age: 61
End: 2021-11-03

## 2021-11-03 ENCOUNTER — APPOINTMENT (OUTPATIENT)
Dept: ELECTROPHYSIOLOGY | Facility: CLINIC | Age: 61
End: 2021-11-03
Payer: MEDICARE

## 2021-11-03 PROCEDURE — 93295 DEV INTERROG REMOTE 1/2/MLT: CPT | Mod: NC

## 2021-11-03 PROCEDURE — 93296 REM INTERROG EVL PM/IDS: CPT

## 2021-11-12 ENCOUNTER — NON-APPOINTMENT (OUTPATIENT)
Age: 61
End: 2021-11-12

## 2021-11-12 ENCOUNTER — APPOINTMENT (OUTPATIENT)
Dept: ELECTROPHYSIOLOGY | Facility: CLINIC | Age: 61
End: 2021-11-12
Payer: MEDICARE

## 2021-11-12 VITALS — SYSTOLIC BLOOD PRESSURE: 96 MMHG | DIASTOLIC BLOOD PRESSURE: 64 MMHG | HEART RATE: 70 BPM

## 2021-11-12 PROCEDURE — 93000 ELECTROCARDIOGRAM COMPLETE: CPT | Mod: 59

## 2021-11-12 PROCEDURE — 93283 PRGRMG EVAL IMPLANTABLE DFB: CPT

## 2022-02-14 ENCOUNTER — APPOINTMENT (OUTPATIENT)
Dept: ELECTROPHYSIOLOGY | Facility: CLINIC | Age: 62
End: 2022-02-14
Payer: MEDICARE

## 2022-02-14 ENCOUNTER — NON-APPOINTMENT (OUTPATIENT)
Age: 62
End: 2022-02-14

## 2022-02-14 PROCEDURE — 93296 REM INTERROG EVL PM/IDS: CPT

## 2022-02-14 PROCEDURE — 93295 DEV INTERROG REMOTE 1/2/MLT: CPT

## 2022-04-21 ENCOUNTER — APPOINTMENT (OUTPATIENT)
Dept: HEART FAILURE | Facility: CLINIC | Age: 62
End: 2022-04-21
Payer: MEDICARE

## 2022-04-21 VITALS
HEART RATE: 68 BPM | OXYGEN SATURATION: 100 % | WEIGHT: 192 LBS | SYSTOLIC BLOOD PRESSURE: 111 MMHG | HEIGHT: 71 IN | BODY MASS INDEX: 26.88 KG/M2 | DIASTOLIC BLOOD PRESSURE: 72 MMHG

## 2022-04-21 PROCEDURE — 99214 OFFICE O/P EST MOD 30 MIN: CPT

## 2022-04-22 LAB
ALBUMIN SERPL ELPH-MCNC: 4.9 G/DL
ALP BLD-CCNC: 68 U/L
ALT SERPL-CCNC: 23 U/L
ANION GAP SERPL CALC-SCNC: 14 MMOL/L
AST SERPL-CCNC: 25 U/L
BASOPHILS # BLD AUTO: 0.02 K/UL
BASOPHILS NFR BLD AUTO: 0.3 %
BILIRUB SERPL-MCNC: 0.7 MG/DL
BUN SERPL-MCNC: 15 MG/DL
CALCIUM SERPL-MCNC: 9.8 MG/DL
CHLORIDE SERPL-SCNC: 103 MMOL/L
CHOLEST SERPL-MCNC: 116 MG/DL
CO2 SERPL-SCNC: 24 MMOL/L
CREAT SERPL-MCNC: 0.89 MG/DL
EGFR: 96 ML/MIN/1.73M2
EOSINOPHIL # BLD AUTO: 0.13 K/UL
EOSINOPHIL NFR BLD AUTO: 2 %
ESTIMATED AVERAGE GLUCOSE: 143 MG/DL
GLUCOSE SERPL-MCNC: 114 MG/DL
HBA1C MFR BLD HPLC: 6.6 %
HCT VFR BLD CALC: 41.6 %
HDLC SERPL-MCNC: 56 MG/DL
HGB BLD-MCNC: 13.7 G/DL
IMM GRANULOCYTES NFR BLD AUTO: 0.3 %
LDLC SERPL CALC-MCNC: 50 MG/DL
LYMPHOCYTES # BLD AUTO: 1.88 K/UL
LYMPHOCYTES NFR BLD AUTO: 29.2 %
MAN DIFF?: NORMAL
MCHC RBC-ENTMCNC: 30.5 PG
MCHC RBC-ENTMCNC: 32.9 GM/DL
MCV RBC AUTO: 92.7 FL
MONOCYTES # BLD AUTO: 0.79 K/UL
MONOCYTES NFR BLD AUTO: 12.3 %
NEUTROPHILS # BLD AUTO: 3.6 K/UL
NEUTROPHILS NFR BLD AUTO: 55.9 %
NONHDLC SERPL-MCNC: 60 MG/DL
NT-PROBNP SERPL-MCNC: 341 PG/ML
PLATELET # BLD AUTO: 157 K/UL
POTASSIUM SERPL-SCNC: 4.7 MMOL/L
PROT SERPL-MCNC: 6.8 G/DL
RBC # BLD: 4.49 M/UL
RBC # FLD: 13.3 %
SODIUM SERPL-SCNC: 141 MMOL/L
TRIGL SERPL-MCNC: 51 MG/DL
WBC # FLD AUTO: 6.44 K/UL

## 2022-04-22 NOTE — CARDIOLOGY SUMMARY
[de-identified] : \par 10/25/21 - NSR, anterior infarct, inferior infarct (old)\par 4/26/21 - NSR, nl axis, HR 71, low voltage limb leads; inferior and anterior Q waves; PRWP\par 10/05/20 - NSR, anterior Q waves (old), inf Q waves, PRWP\par 06/22/20 NSR V paced HR 62 \par 03/09/20 - NSR, HR 60, anterior Q waves (old), inferior Q waves, PRWP\par 11/18/19 - sinus, HR 60, PRWP, Q waves anteriorly (old)\par 01/02/19: SB at 59 bpm.\par 11/29/18: NSR, NSST [de-identified] : \par CPET 08/11/20 - 10:53 min; 4.1 METS; peak VO2 14.4 (43% predicted); VO2 at AT 4.6 ml/kg/min (<40% predicted); O2/pulse was low with VO2/work slope of 8.1 (reduced). VE/VCO2 35; OUES 0.9 L/min (reduced) c/w Peres class C cardiac failure with possible element of decondiitoning\par  [de-identified] : \par 06/21/21 TTE: LVIDd 6.3 cm, LVEF 30%, mod DD, normal RV size and function, mildly dilated LA, MitraClip well seated with mild-mod MR (mean gradient 3 mmHg with HR of 88 bpm), min TR. \par \par 3/23/21 s/p rossana clip: LVIDd 6.6cm , normal RV size and function, LVEF 20%, severe LVE with severe global LV systolic dysfunction, mild-moderate MR - mean gradient 2-3mmHg. \par \par 10/20/20 LVEDD 6.9 cm, EF 27% with inferior wall AK, moderate DD, normal RV size and function, mildly dilated LA, mod-severe eccentric MR, mild TR, est RVSP 27 mmHg\par \par 12/9/19 LVEDD 6.5 EF 25% mod-severe eccentric MR, inferior wall AK\par \par 06/03/19 - LVEDD 5.9 cm, EF 28% (mild improvement from prior), mod-severe MR, inferior wall AK\par \par 02/27/19 - LVEDD 6.7 cm, EF 25% (read as 31% but incorrect), severe MR with inferior wall HK/akinesis\par \par 11/14/18 TTE LVEF 15% with severe global LV systolic dysfunction\par \par 11/16/18 VERN LVEF 16% with severe global LVSD, severe stage III diastolic dysfunction, severe MR, severe LA & LV enlargement  [de-identified] : \par 03/26/20 - viability study - mod-severe defects in basal anterolateral, inferior, inferolateral and inferoapical walls fixed c/w infarct; 24 hour redistribution of mid-anterolateral and distal lateral walls c/w viability; anterior, septal and apical walls with preserved perfusion c/w viable myocardium; EF 19%\par \par 11/18 - The left ventricle was enlarged. The anterior, anteroseptal, anterolateral, apical, and mid to distal inferoseptal walls demonstrate mostly preserved perfusion on rest imaging suggestive of viable myocardium. There is a moderate to severe defect in the distal inferior and distal inferolateral walls that is mostly fixed on delay imaging suggestive of mild to moderate viability. There is a small, severe defect in the basal inferoseptum that demonstrates improved perfusion on delay imaging suggestive of mild to moderate residual viability. There are severe defects in the basal to mid inferior and basal to mid inferolateral walls that are mostly fixed on delay imaging suggestive of infarct with minimal residual viability. \par  [de-identified] : \par 2/16/21 - mid LM 20%, pLAD 30%, mLAD patent stent; D1 20% stenosis; prox LCx 60% stenosis; OM1 100% stenosis; prox % stenosis; RA 5, PA 30/12/20, PCWP 13 (v wave 18); CO/CI 5.8/2.8\par \par 11/20/18 - Impella-assisted PCI of pLAD \par \par 11/15/18 C pLAD 90%, mCx 100%, pRCA 100%

## 2022-04-22 NOTE — ASSESSMENT
[FreeTextEntry1] : Mr. Jefferson is a 63 yo M with an ICM/HFrEF (EF 15-20% improved to 30% with LVEDD 6.9 cm) s/p [place] [employer] ICD 4/19, CAD s/p Impella-Assisted PCI on 11/20/18 w/ 1 stent to pLAD w/ residual  of RCA/LCx, mod-severe MR now status post MitraClip 3/22/21 with improvement in MR, NIDDM (A1c 6.3) who presents today for scheduled follow-up.\par \par He is ACC/AHA Stage C HF, NYHA Class I symptoms. He is euvolemic on exam and low normotensive but has low BP particularly after exercising. \par \par 1. Ischemic cardiomyopathy HFrEF-\par - GDMT: will continue with current regimen of Entresto 49/51 mg BID, Toprol-XL 50 mg BID and Spironolactone 12.5 mg QOD. Further uptitrated limited by marginal BP and HR of 60s with single chamber ICD. \par - Maintaining euvolemia without need of loop diuretics; on the contrary is often hypovolemic and encouraged hydration as well as liberalizing sodium intake\par - Device: Single chamber ICD\par \par 2. Mitral Regurgitation s/p MitraClip 3/22/21:\par - TTE from 6/21/21 with mild-mod MR and mean gradient of 3 mmHg\par - repeat TTE \par \par 3. CAD s/p PCI-LAD with  of RCA/LCx:\par - No s/s of active ischemia\par - Continue ASA, BB and ARB (in Entresto) \par - Continue high intensity statin therapy as prescribed\par \par 4. DM - A1c increased to 6.6\par - patient made aware and advised to discuss with primary \par \par RTC 6 months with me

## 2022-04-22 NOTE — CARDIOLOGY SUMMARY
[de-identified] : \par 10/25/21 - NSR, anterior infarct, inferior infarct (old)\par 4/26/21 - NSR, nl axis, HR 71, low voltage limb leads; inferior and anterior Q waves; PRWP\par 10/05/20 - NSR, anterior Q waves (old), inf Q waves, PRWP\par 06/22/20 NSR V paced HR 62 \par 03/09/20 - NSR, HR 60, anterior Q waves (old), inferior Q waves, PRWP\par 11/18/19 - sinus, HR 60, PRWP, Q waves anteriorly (old)\par 01/02/19: SB at 59 bpm.\par 11/29/18: NSR, NSST [de-identified] : \par CPET 08/11/20 - 10:53 min; 4.1 METS; peak VO2 14.4 (43% predicted); VO2 at AT 4.6 ml/kg/min (<40% predicted); O2/pulse was low with VO2/work slope of 8.1 (reduced). VE/VCO2 35; OUES 0.9 L/min (reduced) c/w Peres class C cardiac failure with possible element of decondiitoning\par  [de-identified] : \par 06/21/21 TTE: LVIDd 6.3 cm, LVEF 30%, mod DD, normal RV size and function, mildly dilated LA, MitraClip well seated with mild-mod MR (mean gradient 3 mmHg with HR of 88 bpm), min TR. \par \par 3/23/21 s/p rossana clip: LVIDd 6.6cm , normal RV size and function, LVEF 20%, severe LVE with severe global LV systolic dysfunction, mild-moderate MR - mean gradient 2-3mmHg. \par \par 10/20/20 LVEDD 6.9 cm, EF 27% with inferior wall AK, moderate DD, normal RV size and function, mildly dilated LA, mod-severe eccentric MR, mild TR, est RVSP 27 mmHg\par \par 12/9/19 LVEDD 6.5 EF 25% mod-severe eccentric MR, inferior wall AK\par \par 06/03/19 - LVEDD 5.9 cm, EF 28% (mild improvement from prior), mod-severe MR, inferior wall AK\par \par 02/27/19 - LVEDD 6.7 cm, EF 25% (read as 31% but incorrect), severe MR with inferior wall HK/akinesis\par \par 11/14/18 TTE LVEF 15% with severe global LV systolic dysfunction\par \par 11/16/18 VERN LVEF 16% with severe global LVSD, severe stage III diastolic dysfunction, severe MR, severe LA & LV enlargement  [de-identified] : \par 03/26/20 - viability study - mod-severe defects in basal anterolateral, inferior, inferolateral and inferoapical walls fixed c/w infarct; 24 hour redistribution of mid-anterolateral and distal lateral walls c/w viability; anterior, septal and apical walls with preserved perfusion c/w viable myocardium; EF 19%\par \par 11/18 - The left ventricle was enlarged. The anterior, anteroseptal, anterolateral, apical, and mid to distal inferoseptal walls demonstrate mostly preserved perfusion on rest imaging suggestive of viable myocardium. There is a moderate to severe defect in the distal inferior and distal inferolateral walls that is mostly fixed on delay imaging suggestive of mild to moderate viability. There is a small, severe defect in the basal inferoseptum that demonstrates improved perfusion on delay imaging suggestive of mild to moderate residual viability. There are severe defects in the basal to mid inferior and basal to mid inferolateral walls that are mostly fixed on delay imaging suggestive of infarct with minimal residual viability. \par  [de-identified] : \par 2/16/21 - mid LM 20%, pLAD 30%, mLAD patent stent; D1 20% stenosis; prox LCx 60% stenosis; OM1 100% stenosis; prox % stenosis; RA 5, PA 30/12/20, PCWP 13 (v wave 18); CO/CI 5.8/2.8\par \par 11/20/18 - Impella-assisted PCI of pLAD \par \par 11/15/18 C pLAD 90%, mCx 100%, pRCA 100%

## 2022-04-22 NOTE — HISTORY OF PRESENT ILLNESS
[FreeTextEntry1] : Mr. Jefferson is a 63 y/o M with ICM/HFrEF (EF initially 15-20%, LVEDD 6.5 cm; improved to 25%) s/p [place] [employer] ICD 4/19, CAD s/p Impella-Assisted PCI on 11/20/18 w/ 1 stent to pLAD w/ residual  of RCA/LCx, mod-severe MR s/p Mitral Clip 3/22/21, and T2DM (A1c 6.3% 6/22/20), who presents for routine followup of his cardiomyopathy.\par \par Since last visit has been doing well (improved since clip). He feels unlimited on flat ground and exercises for 1 hour 4-5x a week on a treadmill at a speed of 4.5 mph (increased from prior). He does not have trouble with stairs on inclines. Home BP ranges 80s-90s (usually after treadmill). Reports weight has been fairly stable (188 pounds). He denies chest discomfort, palpitations, SOB, lower extremity edema, abdominal bloating, orthopnea and PND. He is taking his medications as prescribed. His ICD has not fired. \par \par Denies bendopnea. Has occasional lightheadedness with positional changes but denies any syncope. \par gino Received Covid vaccine (J&J in March 2021); hasn't received booster.

## 2022-04-22 NOTE — HISTORY OF PRESENT ILLNESS
[FreeTextEntry1] : Mr. Jefferson is a 63 y/o M with ICM/HFrEF (EF initially 15-20%, LVEDD 6.5 cm; improved to 25%) s/p [place] [employer] ICD 4/19, CAD s/p Impella-Assisted PCI on 11/20/18 w/ 1 stent to pLAD w/ residual  of RCA/LCx, mod-severe MR s/p Mitral Clip 3/22/21, and T2DM (A1c 6.3% 6/22/20), who presents for routine followup of his cardiomyopathy.\par \par Since last visit has been doing well (improved since clip). He feels unlimited on flat ground and exercises for 1 hour 4-5x a week on a treadmill at a speed of 4.5 mph (increased from prior). He does not have trouble with stairs on inclines. Home BP ranges 80s-90s (usually after treadmill). Reports weight has been fairly stable (188 pounds). He denies chest discomfort, palpitations, SOB, lower extremity edema, abdominal bloating, orthopnea and PND. He is taking his medications as prescribed. His ICD has not fired. \par \par Denies bendopnea. Has occasional lightheadedness with positional changes but denies any syncope. \par gino Received Covid vaccine (J&J in March 2021); hasn't received booster.

## 2022-04-22 NOTE — ASSESSMENT
[FreeTextEntry1] : Mr. 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 He is euvolemic on exam and low normotensive but has low BP particularly after exercising. \par \par 1. Ischemic cardiomyopathy HFrEF-\par - GDMT: will continue with current regimen of Entresto 49/51 mg BID, Toprol-XL 50 mg BID and Spironolactone 12.5 mg QOD. Further uptitrated limited by marginal BP and HR of 60s with single chamber ICD. \par - Maintaining euvolemia without need of loop diuretics; on the contrary is often hypovolemic and encouraged hydration as well as liberalizing sodium intake\par - Device: Single chamber ICD\par \par 2. Mitral Regurgitation s/p MitraClip 3/22/21:\par - TTE from 6/21/21 with mild-mod MR and mean gradient of 3 mmHg\par - repeat TTE \par \par 3. CAD s/p PCI-LAD with  of RCA/LCx:\par - No s/s of active ischemia\par - Continue ASA, BB and ARB (in Entresto) \par - Continue high intensity statin therapy as prescribed\par \par 4. DM - A1c increased to 6.6\par - patient made aware and advised to discuss with primary \par \par RTC 6 months with me

## 2022-04-22 NOTE — PHYSICAL EXAM
[General Appearance - Well Developed] : well developed [Well Groomed] : well groomed [General Appearance - Well Nourished] : well nourished [General Appearance - In No Acute Distress] : no acute distress [Eyelids - No Xanthelasma] : the eyelids demonstrated no xanthelasmas [] : no respiratory distress [Respiration, Rhythm And Depth] : normal respiratory rhythm and effort [Auscultation Breath Sounds / Voice Sounds] : lungs were clear to auscultation bilaterally [Heart Rate And Rhythm] : heart rate and rhythm were normal [Heart Sounds] : normal S1 and S2 [Murmurs] : no murmurs present [Arterial Pulses Normal] : the arterial pulses were normal [Edema] : no peripheral edema present [Bowel Sounds] : normal bowel sounds [Abdomen Soft] : soft [Abdomen Tenderness] : non-tender [Abnormal Walk] : normal gait [Nail Clubbing] : no clubbing of the fingernails [Skin Color & Pigmentation] : normal skin color and pigmentation [Skin Turgor] : normal skin turgor [No Venous Stasis] : no venous stasis [Oriented To Time, Place, And Person] : oriented to person, place, and time [Impaired Insight] : insight and judgment were intact [Mood] : the mood was normal [Memory Recent] : recent memory was not impaired [FreeTextEntry1] : warm peripherally [Well Developed] : well developed [Well Nourished] : well nourished [No Acute Distress] : no acute distress [Normal Conjunctiva] : normal conjunctiva [Normal Venous Pressure] : normal venous pressure [No Carotid Bruit] : no carotid bruit [Normal S1, S2] : normal S1, S2 [No Murmur] : no murmur [No Rub] : no rub [No Gallop] : no gallop [Clear Lung Fields] : clear lung fields [Good Air Entry] : good air entry [No Respiratory Distress] : no respiratory distress  [Soft] : abdomen soft [Non Tender] : non-tender [No Masses/organomegaly] : no masses/organomegaly [Normal Bowel Sounds] : normal bowel sounds [Normal Gait] : normal gait [No Edema] : no edema [No Cyanosis] : no cyanosis [No Clubbing] : no clubbing [No Varicosities] : no varicosities [No Rash] : no rash [No Skin Lesions] : no skin lesions [Moves all extremities] : moves all extremities [No Focal Deficits] : no focal deficits [Normal Speech] : normal speech [Alert and Oriented] : alert and oriented [Normal memory] : normal memory

## 2022-05-18 ENCOUNTER — APPOINTMENT (OUTPATIENT)
Dept: ELECTROPHYSIOLOGY | Facility: CLINIC | Age: 62
End: 2022-05-18
Payer: MEDICARE

## 2022-05-18 ENCOUNTER — NON-APPOINTMENT (OUTPATIENT)
Age: 62
End: 2022-05-18

## 2022-05-18 PROCEDURE — 93296 REM INTERROG EVL PM/IDS: CPT

## 2022-05-18 PROCEDURE — 93295 DEV INTERROG REMOTE 1/2/MLT: CPT

## 2022-06-29 ENCOUNTER — OUTPATIENT (OUTPATIENT)
Dept: OUTPATIENT SERVICES | Facility: HOSPITAL | Age: 62
LOS: 1 days | End: 2022-06-29
Payer: MEDICARE

## 2022-06-29 ENCOUNTER — APPOINTMENT (OUTPATIENT)
Dept: CV DIAGNOSITCS | Facility: HOSPITAL | Age: 62
End: 2022-06-29

## 2022-06-29 DIAGNOSIS — Z95.810 PRESENCE OF AUTOMATIC (IMPLANTABLE) CARDIAC DEFIBRILLATOR: Chronic | ICD-10-CM

## 2022-06-29 DIAGNOSIS — I42.9 CARDIOMYOPATHY, UNSPECIFIED: ICD-10-CM

## 2022-06-29 PROCEDURE — 93306 TTE W/DOPPLER COMPLETE: CPT

## 2022-06-29 PROCEDURE — 93306 TTE W/DOPPLER COMPLETE: CPT | Mod: 26

## 2022-08-12 ENCOUNTER — APPOINTMENT (OUTPATIENT)
Dept: ELECTROPHYSIOLOGY | Facility: CLINIC | Age: 62
End: 2022-08-12

## 2022-08-18 ENCOUNTER — APPOINTMENT (OUTPATIENT)
Dept: ELECTROPHYSIOLOGY | Facility: CLINIC | Age: 62
End: 2022-08-18

## 2022-08-18 ENCOUNTER — NON-APPOINTMENT (OUTPATIENT)
Age: 62
End: 2022-08-18

## 2022-08-18 PROCEDURE — 93295 DEV INTERROG REMOTE 1/2/MLT: CPT

## 2022-08-18 PROCEDURE — 93296 REM INTERROG EVL PM/IDS: CPT

## 2022-10-17 ENCOUNTER — APPOINTMENT (OUTPATIENT)
Dept: HEART FAILURE | Facility: CLINIC | Age: 62
End: 2022-10-17

## 2022-10-17 ENCOUNTER — NON-APPOINTMENT (OUTPATIENT)
Age: 62
End: 2022-10-17

## 2022-10-17 VITALS
WEIGHT: 198 LBS | HEART RATE: 70 BPM | SYSTOLIC BLOOD PRESSURE: 102 MMHG | BODY MASS INDEX: 27.72 KG/M2 | OXYGEN SATURATION: 99 % | DIASTOLIC BLOOD PRESSURE: 67 MMHG | HEIGHT: 71 IN

## 2022-10-17 PROCEDURE — 99214 OFFICE O/P EST MOD 30 MIN: CPT | Mod: 25

## 2022-10-17 PROCEDURE — 93000 ELECTROCARDIOGRAM COMPLETE: CPT

## 2022-10-17 RX ORDER — TOBRAMYCIN AND DEXAMETHASONE 3; 1 MG/ML; MG/ML
0.3-0.1 SUSPENSION/ DROPS OPHTHALMIC
Qty: 5 | Refills: 0 | Status: DISCONTINUED | COMMUNITY
Start: 2022-05-10

## 2022-10-17 RX ORDER — COVID-19 ANTIGEN TEST
KIT MISCELLANEOUS
Qty: 8 | Refills: 0 | Status: DISCONTINUED | COMMUNITY
Start: 2022-09-08

## 2022-10-17 NOTE — ASSESSMENT
[FreeTextEntry1] : Mr. Jefferson is a 61 yo M with an ICM/HFrEF (EF 15-20% improved to 30% with LVEDD 6.9 cm) s/p [place] Sci dual-chamber ICD 4/19, CAD s/p Impella-Assisted PCI on 11/20/18 w/ 1 stent to pLAD w/ residual  of RCA/LCx, mod-severe MR now status post MitraClip 3/22/21 with improvement in MR, NIDDM (A1c 6.6 4/22) who presents today for scheduled follow-up.\par \par He is ACC/AHA Stage C HF, NYHA Class I symptoms. He is euvolemic on exam and low normotensive but has low BP particularly after exercising. \par \par 1. Ischemic cardiomyopathy HFrEF-\par - GDMT: will continue with current regimen of Entresto 49/51 mg BID, Toprol-XL 50 mg BID and Spironolactone 12.5 mg QOD. Further uptitrated limited by marginal BP. Will defer induction of SGLT2i given relatively low euvolemic volume status off loop diuretics and current neurohormonal blockade regimen. \par - Maintaining euvolemia without need of loop diuretics; on the contrary is often hypovolemic and encouraged hydration as well as liberalizing sodium intake\par - Device: Dual chamber ICD; will reach out to EP team to adjust device settings and lower pacing rates to 60 and increase possible AV delay to reduce RV pacing (currently 18%) \par \par 2. Mitral Regurgitation s/p MitraClip 3/22/21:\par - TTE from 6/21/21 with mild-mod MR and mean gradient of 3 mmHg\par \par 3. CAD s/p PCI-LAD with  of RCA/LCx:\par - No s/s of active ischemia\par - Continue ASA, BB and ARB (in Entresto) \par - Continue high intensity statin therapy as prescribed\par \par 4. DM - A1c increased to 6.6\par - patient made aware and advised to discuss with primary \par \par RTC 6 months with Dr. Triana

## 2022-10-17 NOTE — HISTORY OF PRESENT ILLNESS
[FreeTextEntry1] : Mr. Jefferson is a 63 y/o M with ICM/HFrEF (EF initially 15-20%, LVEDD 6.5 cm; improved to 20-25%) s/p [place] Sci ICD 4/19, CAD s/p Impella-Assisted PCI on 11/20/18 w/ 1 stent to pLAD w/ residual  of RCA/LCx, mod-severe MR s/p Mitral Clip 3/22/21, and T2DM (A1c 6.6% 4/22), who presents for routine followup of his cardiomyopathy.\par \anna Since last visit has been doing well functionally. Had COVID in May 22' recovered at home (didn't receive any Covid-specific therapies). He feels unlimited on flat ground and exercises for 1 hour 4-5x a week on a treadmill at a speed of 4.5 mph (increased from prior). He does not have trouble with stairs on inclines. Home BP ranges 80s-90s (usually after treadmill). \par \par States his periods of lightheadedness and dizziness have resolved with liberalized sodium in his diet. Reports weight has been increased (previously 192 pounds now 198) which he believes is due to caloric intake. He denies chest discomfort, palpitations, SOB, lower extremity edema, abdominal bloating, orthopnea, no bendopnea and PND. He is taking his medications as prescribed. His ICD has not fired (of note, noted to be RV pacing 18% at last device interrogation). \par \anna Received Covid vaccine (J&J in March 2021); hasn't received booster.

## 2022-10-17 NOTE — END OF VISIT
[FreeTextEntry3] : I was physically present for the key portions of the evaluation and management (E/M) service provided.  I agree with the above history, physical, and plan which I have reviewed and edited where appropriate.\par  [Time Spent: ___ minutes] : I have spent [unfilled] minutes of time on the encounter.

## 2022-10-17 NOTE — PHYSICAL EXAM
[Well Developed] : well developed [Well Nourished] : well nourished [No Acute Distress] : no acute distress [Normal Conjunctiva] : normal conjunctiva [Normal Venous Pressure] : normal venous pressure [No Carotid Bruit] : no carotid bruit [Normal S1, S2] : normal S1, S2 [No Murmur] : no murmur [No Rub] : no rub [No Gallop] : no gallop [Clear Lung Fields] : clear lung fields [Good Air Entry] : good air entry [No Respiratory Distress] : no respiratory distress  [Soft] : abdomen soft [Non Tender] : non-tender [No Masses/organomegaly] : no masses/organomegaly [Normal Bowel Sounds] : normal bowel sounds [Normal Gait] : normal gait [No Edema] : no edema [No Cyanosis] : no cyanosis [No Clubbing] : no clubbing [No Varicosities] : no varicosities [No Rash] : no rash [No Skin Lesions] : no skin lesions [Moves all extremities] : moves all extremities [No Focal Deficits] : no focal deficits [Normal Speech] : normal speech [Alert and Oriented] : alert and oriented [Normal memory] : normal memory [de-identified] : JVP <6 cm w/o HJR

## 2022-10-19 LAB
ALBUMIN SERPL ELPH-MCNC: 4.7 G/DL
ALP BLD-CCNC: 59 U/L
ALT SERPL-CCNC: 21 U/L
ANION GAP SERPL CALC-SCNC: 12 MMOL/L
AST SERPL-CCNC: 27 U/L
BILIRUB SERPL-MCNC: 0.9 MG/DL
BUN SERPL-MCNC: 14 MG/DL
CALCIUM SERPL-MCNC: 9.8 MG/DL
CHLORIDE SERPL-SCNC: 102 MMOL/L
CO2 SERPL-SCNC: 24 MMOL/L
CREAT SERPL-MCNC: 0.88 MG/DL
EGFR: 96 ML/MIN/1.73M2
GLUCOSE SERPL-MCNC: 113 MG/DL
NT-PROBNP SERPL-MCNC: 277 PG/ML
POTASSIUM SERPL-SCNC: 4.4 MMOL/L
PROT SERPL-MCNC: 6.7 G/DL
SODIUM SERPL-SCNC: 138 MMOL/L

## 2022-10-27 ENCOUNTER — APPOINTMENT (OUTPATIENT)
Dept: ELECTROPHYSIOLOGY | Facility: CLINIC | Age: 62
End: 2022-10-27

## 2022-10-27 ENCOUNTER — NON-APPOINTMENT (OUTPATIENT)
Age: 62
End: 2022-10-27

## 2022-10-27 VITALS
HEART RATE: 73 BPM | BODY MASS INDEX: 27.72 KG/M2 | SYSTOLIC BLOOD PRESSURE: 105 MMHG | OXYGEN SATURATION: 100 % | WEIGHT: 198 LBS | HEIGHT: 71 IN | DIASTOLIC BLOOD PRESSURE: 68 MMHG

## 2022-10-27 PROCEDURE — 93283 PRGRMG EVAL IMPLANTABLE DFB: CPT

## 2022-10-27 PROCEDURE — 93000 ELECTROCARDIOGRAM COMPLETE: CPT | Mod: 59

## 2022-11-01 NOTE — PROVIDER CONTACT NOTE (OTHER) - SITUATION
----- Message from Martha Martinez NP sent at 11/1/2022 10:46 AM CDT -----  All labs are normal  No change.   Please reorder these labs for 1 year out   BP 97/68 lower than baseline, pt on tridil gtt

## 2022-11-17 ENCOUNTER — APPOINTMENT (OUTPATIENT)
Dept: ELECTROPHYSIOLOGY | Facility: CLINIC | Age: 62
End: 2022-11-17

## 2022-12-14 ENCOUNTER — RX RENEWAL (OUTPATIENT)
Age: 62
End: 2022-12-14

## 2023-01-25 ENCOUNTER — NON-APPOINTMENT (OUTPATIENT)
Age: 63
End: 2023-01-25

## 2023-01-26 ENCOUNTER — APPOINTMENT (OUTPATIENT)
Dept: ELECTROPHYSIOLOGY | Facility: CLINIC | Age: 63
End: 2023-01-26
Payer: MEDICARE

## 2023-01-26 ENCOUNTER — NON-APPOINTMENT (OUTPATIENT)
Age: 63
End: 2023-01-26

## 2023-01-26 PROCEDURE — 93296 REM INTERROG EVL PM/IDS: CPT

## 2023-01-26 PROCEDURE — 93295 DEV INTERROG REMOTE 1/2/MLT: CPT

## 2023-02-07 ENCOUNTER — RX RENEWAL (OUTPATIENT)
Age: 63
End: 2023-02-07

## 2023-04-17 ENCOUNTER — APPOINTMENT (OUTPATIENT)
Dept: HEART FAILURE | Facility: CLINIC | Age: 63
End: 2023-04-17
Payer: MEDICARE

## 2023-04-17 ENCOUNTER — NON-APPOINTMENT (OUTPATIENT)
Age: 63
End: 2023-04-17

## 2023-04-17 VITALS
HEIGHT: 60 IN | WEIGHT: 198.25 LBS | HEART RATE: 60 BPM | TEMPERATURE: 98.5 F | OXYGEN SATURATION: 97 % | BODY MASS INDEX: 38.92 KG/M2 | DIASTOLIC BLOOD PRESSURE: 68 MMHG | SYSTOLIC BLOOD PRESSURE: 103 MMHG

## 2023-04-17 PROCEDURE — 36415 COLL VENOUS BLD VENIPUNCTURE: CPT

## 2023-04-17 PROCEDURE — 93000 ELECTROCARDIOGRAM COMPLETE: CPT

## 2023-04-17 PROCEDURE — 99214 OFFICE O/P EST MOD 30 MIN: CPT | Mod: 25

## 2023-04-17 RX ORDER — METFORMIN HYDROCHLORIDE 1000 MG/1
1000 TABLET, COATED ORAL
Qty: 60 | Refills: 5 | Status: ACTIVE | COMMUNITY
Start: 2018-12-05

## 2023-04-21 NOTE — HISTORY OF PRESENT ILLNESS
[FreeTextEntry1] : Mr. Jefferson is a 64 y/o M with ICM/HFrEF (EF initially 15-20%, LVEDD 6.5 cm; improved to 20-25%) s/p [place] Sci ICD 4/19, CAD s/p Impella-Assisted PCI on 11/20/18 w/ 1 stent to pLAD w/ residual  of RCA/LCx, mod-severe MR s/p Mitral Clip 3/22/21, and T2DM (A1c 6.6% 4/22), who presents for routine followup of his cardiomyopathy.\anna \anna Since last visit has been doing well functionally. He feels unlimited on flat ground and exercises for 1 hour 4-5x a week on a treadmill at a speed of 4.7 mph (increased from prior). He does not have trouble with stairs on inclines. \anna \anna States his periods of lightheadedness and dizziness have improved with liberalized sodium in his diet. Reports weight has been increased (previously 192 pounds now 198) which he believes is due to caloric intake. He denies chest discomfort, palpitations, SOB, lower extremity edema, abdominal bloating, orthopnea, no bendopnea and PND. He is taking his medications as prescribed. His ICD has not fired (of note, noted to be RV pacing 18% at last device interrogation). \anna christian Received Covid vaccine (J&J in March 2021); hasn't received booster.

## 2023-04-21 NOTE — PHYSICAL EXAM
[Well Developed] : well developed [Well Nourished] : well nourished [No Acute Distress] : no acute distress [Normal Conjunctiva] : normal conjunctiva [Normal Venous Pressure] : normal venous pressure [No Carotid Bruit] : no carotid bruit [Normal S1, S2] : normal S1, S2 [No Murmur] : no murmur [No Rub] : no rub [No Gallop] : no gallop [Clear Lung Fields] : clear lung fields [Good Air Entry] : good air entry [No Respiratory Distress] : no respiratory distress  [Soft] : abdomen soft [Non Tender] : non-tender [No Masses/organomegaly] : no masses/organomegaly [Normal Gait] : normal gait [Normal Bowel Sounds] : normal bowel sounds [No Edema] : no edema [No Clubbing] : no clubbing [No Cyanosis] : no cyanosis [No Varicosities] : no varicosities [No Rash] : no rash [No Skin Lesions] : no skin lesions [Moves all extremities] : moves all extremities [No Focal Deficits] : no focal deficits [Normal Speech] : normal speech [Alert and Oriented] : alert and oriented [Normal memory] : normal memory [de-identified] : JVP <6 cm w/o HJR

## 2023-04-21 NOTE — CARDIOLOGY SUMMARY
[de-identified] : \par Device Interrogation Aug 22’: AP- ~70 BPM, no events , RA paced 82%, RV 18%  [de-identified] : \par 4/17/23 - unchanged\par 10/17/22 - NSR, HR 70, PRWP, anterior/inferior infarct \par 10/25/21 - NSR, anterior infarct, inferior infarct (old)\par 4/26/21 - NSR, nl axis, HR 71, low voltage limb leads; inferior and anterior Q waves; PRWP\par 10/05/20 - NSR, anterior Q waves (old), inf Q waves, PRWP\par 06/22/20 NSR V paced HR 62 \par 03/09/20 - NSR, HR 60, anterior Q waves (old), inferior Q waves, PRWP\par 11/18/19 - sinus, HR 60, PRWP, Q waves anteriorly (old)\par 01/02/19: SB at 59 bpm.\par 11/29/18: NSR, NSST [de-identified] : \par CPET 08/11/20 - 10:53 min; 4.1 METS; peak VO2 14.4 (43% predicted); VO2 at AT 4.6 ml/kg/min (<40% predicted); O2/pulse was low with VO2/work slope of 8.1 (reduced). VE/VCO2 35; OUES 0.9 L/min (reduced) c/w Peres class C cardiac failure with possible element of deconditioning\par  [de-identified] : \par 6/29/22 TTE: LVEF 20%, LVIDd 6.9 cm, segmental LVSdysfunction, mod diastolic dysfunction, normal RVSF, nml BiAtria, Jessica Clip present,  mod MR (mean gradient 2 mmHg with HR 70bpm), VTI 18!\par \par 06/21/21 TTE: LVIDd 6.3 cm, LVEF 30%, mod DD, normal RV size and function, mildly dilated LA, MitraClip well seated with mild-mod MR (mean gradient 3 mmHg with HR of 88 bpm), min TR. \par \par 3/23/21 s/p jessica clip: LVIDd 6.6cm , normal RV size and function, LVEF 20%, severe LVE with severe global LV systolic dysfunction, mild-moderate MR - mean gradient 2-3mmHg. \par \par 10/20/20 LVEDD 6.9 cm, EF 27% with inferior wall AK, moderate DD, normal RV size and function, mildly dilated LA, mod-severe eccentric MR, mild TR, est RVSP 27 mmHg\par \par 12/9/19 LVEDD 6.5 EF 25% mod-severe eccentric MR, inferior wall AK\par \par 06/03/19 - LVEDD 5.9 cm, EF 28% (mild improvement from prior), mod-severe MR, inferior wall AK\par \par 02/27/19 - LVEDD 6.7 cm, EF 25% (read as 31% but incorrect), severe MR with inferior wall HK/akinesis\par \par 11/14/18 TTE LVEF 15% with severe global LV systolic dysfunction\par \par 11/16/18 VERN LVEF 16% with severe global LVSD, severe stage III diastolic dysfunction, severe MR, severe LA & LV enlargement  [de-identified] : \par 2/16/21 - mid LM 20%, pLAD 30%, mLAD patent stent; D1 20% stenosis; prox LCx 60% stenosis; OM1 100% stenosis; prox % stenosis; RA 5, PA 30/12/20, PCWP 13 (v wave 18); CO/CI 5.8/2.8\par \par 11/20/18 - Impella-assisted PCI of pLAD \par \par 11/15/18 C pLAD 90%, mCx 100%, pRCA 100% [de-identified] : \par 03/26/20 - viability study - mod-severe defects in basal anterolateral, inferior, inferolateral and inferoapical walls fixed c/w infarct; 24 hour redistribution of mid-anterolateral and distal lateral walls c/w viability; anterior, septal and apical walls with preserved perfusion c/w viable myocardium; EF 19%\par \par 11/18 - The left ventricle was enlarged. The anterior, anteroseptal, anterolateral, apical, and mid to distal inferoseptal walls demonstrate mostly preserved perfusion on rest imaging suggestive of viable myocardium. There is a moderate to severe defect in the distal inferior and distal inferolateral walls that is mostly fixed on delay imaging suggestive of mild to moderate viability. There is a small, severe defect in the basal inferoseptum that demonstrates improved perfusion on delay imaging suggestive of mild to moderate residual viability. There are severe defects in the basal to mid inferior and basal to mid inferolateral walls that are mostly fixed on delay imaging suggestive of infarct with minimal residual viability. \par

## 2023-04-26 ENCOUNTER — NON-APPOINTMENT (OUTPATIENT)
Age: 63
End: 2023-04-26

## 2023-04-26 LAB
ALBUMIN SERPL ELPH-MCNC: 4.8 G/DL
ALP BLD-CCNC: 54 U/L
ALT SERPL-CCNC: 17 U/L
ANION GAP SERPL CALC-SCNC: 16 MMOL/L
AST SERPL-CCNC: 24 U/L
BASOPHILS # BLD AUTO: 0.02 K/UL
BASOPHILS NFR BLD AUTO: 0.3 %
BILIRUB SERPL-MCNC: 0.8 MG/DL
BUN SERPL-MCNC: 14 MG/DL
CALCIUM SERPL-MCNC: 9.7 MG/DL
CHLORIDE SERPL-SCNC: 101 MMOL/L
CHOLEST SERPL-MCNC: 109 MG/DL
CO2 SERPL-SCNC: 21 MMOL/L
CREAT SERPL-MCNC: 0.87 MG/DL
EGFR: 96 ML/MIN/1.73M2
EOSINOPHIL # BLD AUTO: 0.07 K/UL
EOSINOPHIL NFR BLD AUTO: 1.2 %
ESTIMATED AVERAGE GLUCOSE: 143 MG/DL
GLUCOSE SERPL-MCNC: 89 MG/DL
HBA1C MFR BLD HPLC: 6.6 %
HCT VFR BLD CALC: 41.3 %
HDLC SERPL-MCNC: 56 MG/DL
HGB BLD-MCNC: 13.6 G/DL
IMM GRANULOCYTES NFR BLD AUTO: 0.2 %
LDLC SERPL CALC-MCNC: 45 MG/DL
LYMPHOCYTES # BLD AUTO: 1.45 K/UL
LYMPHOCYTES NFR BLD AUTO: 24.3 %
MAN DIFF?: NORMAL
MCHC RBC-ENTMCNC: 30.9 PG
MCHC RBC-ENTMCNC: 32.9 GM/DL
MCV RBC AUTO: 93.9 FL
MONOCYTES # BLD AUTO: 0.61 K/UL
MONOCYTES NFR BLD AUTO: 10.2 %
NEUTROPHILS # BLD AUTO: 3.81 K/UL
NEUTROPHILS NFR BLD AUTO: 63.8 %
NONHDLC SERPL-MCNC: 53 MG/DL
NT-PROBNP SERPL-MCNC: 358 PG/ML
PLATELET # BLD AUTO: 151 K/UL
POTASSIUM SERPL-SCNC: 4.5 MMOL/L
PROT SERPL-MCNC: 6.4 G/DL
RBC # BLD: 4.4 M/UL
RBC # FLD: 13.3 %
SODIUM SERPL-SCNC: 138 MMOL/L
TRIGL SERPL-MCNC: 42 MG/DL
TSH SERPL-ACNC: 2.04 UIU/ML
WBC # FLD AUTO: 5.97 K/UL

## 2023-05-01 ENCOUNTER — APPOINTMENT (OUTPATIENT)
Dept: ELECTROPHYSIOLOGY | Facility: CLINIC | Age: 63
End: 2023-05-01
Payer: MEDICARE

## 2023-05-01 ENCOUNTER — NON-APPOINTMENT (OUTPATIENT)
Age: 63
End: 2023-05-01

## 2023-05-01 PROCEDURE — 93296 REM INTERROG EVL PM/IDS: CPT

## 2023-05-01 PROCEDURE — 93295 DEV INTERROG REMOTE 1/2/MLT: CPT

## 2023-05-30 ENCOUNTER — APPOINTMENT (OUTPATIENT)
Dept: CV DIAGNOSITCS | Facility: HOSPITAL | Age: 63
End: 2023-05-30

## 2023-05-30 ENCOUNTER — OUTPATIENT (OUTPATIENT)
Dept: OUTPATIENT SERVICES | Facility: HOSPITAL | Age: 63
LOS: 1 days | End: 2023-05-30
Payer: MEDICARE

## 2023-05-30 DIAGNOSIS — I42.9 CARDIOMYOPATHY, UNSPECIFIED: ICD-10-CM

## 2023-05-30 DIAGNOSIS — Z95.810 PRESENCE OF AUTOMATIC (IMPLANTABLE) CARDIAC DEFIBRILLATOR: Chronic | ICD-10-CM

## 2023-05-30 PROCEDURE — 93306 TTE W/DOPPLER COMPLETE: CPT | Mod: 26

## 2023-05-30 PROCEDURE — 93306 TTE W/DOPPLER COMPLETE: CPT

## 2023-06-07 NOTE — PROVIDER CONTACT NOTE (OTHER) - REASON
BP lower than baseline, diuresed over 1 liter output, on tridil gtt [FreeTextEntry1] : This is a case of a 35 yo  right handed female with PMH of chronic migraine and insomnia, presents today with headaches. Pt stated she is still having daily headaches and 2 migraines a week.  Pt states improved since 2021. pt had covid in Dec 2020 and then had chronic headaches ever since April 20221.  Has had  improvement since then but still having daily headaches.  Pt is now 16 weeks pregnant.  pt saw high risk OB Dr. Nina. \par Pt headaches mainly at the top of the head radiating to front of head.   Pt will get breaks in between.  Pt will usually range from 2hr-8 hrs.   Described as constant pain.  Pt has tried Tylenol with no relief.  Triggers would be weather, period (before pregnancy), not enough water, MSG, bad night sleep.  Sensitivity to light and sound, denies smells. Pt denies n/v with headaches.  Pt denies any visual complaints, loss, or blurriness. \par Pt has been approved for Botox, nortriptyline, and trigger point. by High risk [facility]. \par Pt was seeing Breanna in the past for headaches.  Pt last block was over a month ago.  pt was getting occipital blocks with Dr Carlos. \par \par MRI done 6/3/23 which was alec\par \par Pt has seen DR. Sandoval for chronic coviid symptoms 2022.  \par \par \par Past medication:  Failed Amitriptyline D/c ( HTN, chest pain, palpitations, weight gain), failed propranolol, Topamax (AE- brain fog), Qulipta, Ajovy - failed AE rash, Vyepti- AE severe congestion , Nurtec QOD, Cymbalta 30 mg daily,\par Current medication:aspirin  81 mg one night, next night 162 mg, Valtrex 500, Nortriptyline 70mg, Diclegis, melatonin, botox\par Occupation: Nurse manager at [employer]\par Caffeine: none \par Water: all she drinks \par Exercise: walking challenge\par sleep: 6-7 hrs  \par \par \par Location: top of the head and front \par Description: constant \par Associated symptoms: sens to light and sound\par Triggers: weather, period (before pregnancy), not enough water, MSG, bad night sleep\par Comorbidities: None \par Imaging: MRI \par Medications: Notriplkine 70 mg, \par Interventions: lying down  \par Family history: \par Allergies: NKA\par \par \par Symptoms: headache\par  \par \par Interval: 11/14/2022\par 33 year RH female Pmhx HLD, Anxiety, chronic Migraines who presents today for follow up . Persistent daily headaches since December 2020 following Covid infection. Previously tx by Dr. Carlos and Dr. Rosario Leiva. She is still having headaches occurring intermittent bitemporal and top of head 4/10 but up to 8/10 at least twice per week associated with sens to light and noise, nausea, no vomiting,\par \par CHASE typically sleeps 5 hours per night. \par She has failed multiple prophylactic meds and feels since starting Botox the intensity of her headaches has significantly decreased. She has her last Botox tx on September 20, Nortriptyline 60mg, Verapmil 180mg as well as TPI and ONB. \par \par \par Family hx of Migraines in half brother. \par She is single and lives in [place]. 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 She does not smoke or vape, denies etoh. Denies illicit drug use. She was exercise regularly but stopped last year. \par  \par

## 2023-07-27 ENCOUNTER — APPOINTMENT (OUTPATIENT)
Dept: ELECTROPHYSIOLOGY | Facility: CLINIC | Age: 63
End: 2023-07-27

## 2023-07-31 ENCOUNTER — APPOINTMENT (OUTPATIENT)
Dept: ELECTROPHYSIOLOGY | Facility: CLINIC | Age: 63
End: 2023-07-31
Payer: MEDICARE

## 2023-07-31 ENCOUNTER — NON-APPOINTMENT (OUTPATIENT)
Age: 63
End: 2023-07-31

## 2023-07-31 PROCEDURE — 93296 REM INTERROG EVL PM/IDS: CPT

## 2023-07-31 PROCEDURE — 93295 DEV INTERROG REMOTE 1/2/MLT: CPT

## 2023-10-16 ENCOUNTER — APPOINTMENT (OUTPATIENT)
Dept: HEART FAILURE | Facility: CLINIC | Age: 63
End: 2023-10-16
Payer: MEDICARE

## 2023-10-16 VITALS
BODY MASS INDEX: 5540.27 KG/M2 | DIASTOLIC BLOOD PRESSURE: 70 MMHG | TEMPERATURE: 97.9 F | WEIGHT: 197 LBS | SYSTOLIC BLOOD PRESSURE: 116 MMHG | OXYGEN SATURATION: 99 % | HEART RATE: 60 BPM

## 2023-10-16 PROCEDURE — 99214 OFFICE O/P EST MOD 30 MIN: CPT

## 2023-10-27 ENCOUNTER — NON-APPOINTMENT (OUTPATIENT)
Age: 63
End: 2023-10-27

## 2023-10-27 ENCOUNTER — APPOINTMENT (OUTPATIENT)
Dept: ELECTROPHYSIOLOGY | Facility: CLINIC | Age: 63
End: 2023-10-27
Payer: MEDICARE

## 2023-10-27 VITALS
OXYGEN SATURATION: 98 % | DIASTOLIC BLOOD PRESSURE: 54 MMHG | WEIGHT: 185 LBS | HEART RATE: 62 BPM | BODY MASS INDEX: 36.32 KG/M2 | SYSTOLIC BLOOD PRESSURE: 91 MMHG | HEIGHT: 60 IN

## 2023-10-27 PROCEDURE — 93283 PRGRMG EVAL IMPLANTABLE DFB: CPT

## 2023-10-27 PROCEDURE — 93000 ELECTROCARDIOGRAM COMPLETE: CPT | Mod: 59

## 2023-10-31 LAB
ALBUMIN SERPL ELPH-MCNC: 4.8 G/DL
ALP BLD-CCNC: 58 U/L
ALT SERPL-CCNC: 19 U/L
ANION GAP SERPL CALC-SCNC: 11 MMOL/L
AST SERPL-CCNC: 26 U/L
BILIRUB SERPL-MCNC: 0.9 MG/DL
BUN SERPL-MCNC: 18 MG/DL
CALCIUM SERPL-MCNC: 9.8 MG/DL
CHLORIDE SERPL-SCNC: 102 MMOL/L
CO2 SERPL-SCNC: 22 MMOL/L
CREAT SERPL-MCNC: 0.94 MG/DL
EGFR: 90 ML/MIN/1.73M2
GLUCOSE SERPL-MCNC: 118 MG/DL
NT-PROBNP SERPL-MCNC: 379 PG/ML
POTASSIUM SERPL-SCNC: 4.4 MMOL/L
PROT SERPL-MCNC: 6.6 G/DL
SODIUM SERPL-SCNC: 135 MMOL/L

## 2023-11-08 NOTE — ASU DISCHARGE PLAN (ADULT/PEDIATRIC) - PATIENT BELONGINGS
5F  R thumb pain x1d after mechanical fall while playing gymnastics resulting in pain to the MCP area. Pain is mild, constant, radiating to the distal end of the thumb, worse with movement/palpation. Has not taken anything for the pain. No other injuries. Patient's belongings returned

## 2023-11-17 ENCOUNTER — RX RENEWAL (OUTPATIENT)
Age: 63
End: 2023-11-17

## 2023-12-11 ENCOUNTER — RX RENEWAL (OUTPATIENT)
Age: 63
End: 2023-12-11

## 2024-01-01 NOTE — DISCUSSION/SUMMARY
[FreeTextEntry1] : In summary, this is a 59 year old man with history of severe persistent NYHA III ICM (EF 25-30%) despite revasculariation in 11/2018 and administration of optimal medical therapy. I am pleased to see him doing well today with normal device function and wound healing. He will follow up in device clinic in 3 months.\par \par Mr. Jefferson appeared to understand the whole discussion and verbalized that all of his questions were answered to his satisfaction.\par \par Thank you for allowing me to be involved in the care of this pleasant man. Please feel free to contact me with any questions.\par \par  Pt p/w left foot injury, a piece of metal fell on his foot, noted with left second toe abrasion and bruise on the left great toe. Pt unsure of his last tetanus. Pt came in with post op shoe.

## 2024-01-22 ENCOUNTER — APPOINTMENT (OUTPATIENT)
Dept: HEART FAILURE | Facility: CLINIC | Age: 64
End: 2024-01-22
Payer: MEDICARE

## 2024-01-22 VITALS
HEART RATE: 61 BPM | DIASTOLIC BLOOD PRESSURE: 68 MMHG | BODY MASS INDEX: 37.89 KG/M2 | WEIGHT: 194 LBS | OXYGEN SATURATION: 100 % | TEMPERATURE: 98 F | SYSTOLIC BLOOD PRESSURE: 103 MMHG

## 2024-01-22 DIAGNOSIS — I42.9 CARDIOMYOPATHY, UNSPECIFIED: ICD-10-CM

## 2024-01-22 DIAGNOSIS — I25.5 ISCHEMIC CARDIOMYOPATHY: ICD-10-CM

## 2024-01-22 DIAGNOSIS — I34.0 NONRHEUMATIC MITRAL (VALVE) INSUFFICIENCY: ICD-10-CM

## 2024-01-22 LAB
ANION GAP SERPL CALC-SCNC: 11 MMOL/L
BUN SERPL-MCNC: 15 MG/DL
CALCIUM SERPL-MCNC: 9.5 MG/DL
CHLORIDE SERPL-SCNC: 103 MMOL/L
CO2 SERPL-SCNC: 24 MMOL/L
CREAT SERPL-MCNC: 0.95 MG/DL
EGFR: 89 ML/MIN/1.73M2
GLUCOSE SERPL-MCNC: 115 MG/DL
MAGNESIUM SERPL-MCNC: 2 MG/DL
NT-PROBNP SERPL-MCNC: 364 PG/ML
POTASSIUM SERPL-SCNC: 4.5 MMOL/L
SODIUM SERPL-SCNC: 139 MMOL/L

## 2024-01-22 PROCEDURE — 99214 OFFICE O/P EST MOD 30 MIN: CPT

## 2024-01-22 NOTE — ASSESSMENT
[FreeTextEntry1] : Mr. Jefferson is a 62 y/o M with dilated ICM (LVIDd 6.5 cm, LVEF 22%) s/p [place] Sci dual chamber ICD (4/2019), CAD s/p Impella-assisted PCI to LAD (11/20/18) w/ residual  of RCA/LCx, mod-severe MR s/p Mitral Clip 3/22/21, and T2DM (A1c 6.6% 4/2023), who presents for routine follow-up of his cardiomyopathy. He is ACC/AHA Stage C HF, NYHA Class I-II symptoms low euvolemic, normotensive and well compensated on exam.   #Ischemic cardiomyopathy HFrEF - GDMT:  c/w of Entresto 49/51 mg BID (unable to tolerate higher dose), Toprol-XL 50 mg BID and Spironolactone 12.5 mg QOD and Farxiga 5mg QD  - Maintaining euvolemia without need of loop diuretics; on the contrary is often hypovolemic and encouraged hydration as well as liberalizing sodium intake - Device: Dual chamber ICD - Labs today reveal normal renal and hepatic chemistries. Stable proBNP 342 from 360.  2. Mitral Regurgitation s/p MitraClip 3/22/21 - TTE with possible worsening MR 5/23 - same plan as above   3. CAD s/p PCI-LAD with  of RCA/LCx: - No s/s of active ischemia - Continue ASA, BB and Entresto - Continue high intensity statin therapy as prescribed  4. DM - A1c increased to 6.6 - patient made aware and advised to discuss with primary - c/w metformin 1000 mg twice/day - on farxiga  See Dr. Triana at next available.

## 2024-01-22 NOTE — HISTORY OF PRESENT ILLNESS
[FreeTextEntry1] : Mr. Jefferson is a 64 y/o M with dilated ICM (LVIDd 6.5 cm, LVEF 22%) s/p [place] Sci dual chamber ICD (4/2019), CAD s/p Impella-assisted PCI to LAD (11/20/18) w/ residual  of RCA/LCx, mod-severe MR s/p Mitral Clip 3/22/21, and T2DM (A1c 6.6% 4/2023), who presents for routine follow-up of his cardiomyopathy.  For full initial details, please see note from 12/5/18.   Last seen in October 2023, with addition of Farxiga 5mg QD to his GDMT. States that he feels better overall. He is able to ambulate long distance (walked to clinic which is 800 feet) and multiple flights of stairs without any SOB/ARAIZA. He consistently checks his BP/HR and weights at home, with SBP readings of , HR 58-65, and weight of 190-191lb. He states that he occasionally has SBP readings of 88-90 in the AM but is asymptomatic. No recent ICD firing or hospitalization in the interim. Has good bowel/urinary habits and endorses good appetite overall. Denies taking any additional diuretics.    Pt otherwise denies any CP/palpitation, orthopnea, PND, LH/dizziness, abdominal distension or BLE edema.

## 2024-01-22 NOTE — PHYSICAL EXAM
[Well Developed] : well developed [Well Nourished] : well nourished [No Acute Distress] : no acute distress [Normal Conjunctiva] : normal conjunctiva [Normal Venous Pressure] : normal venous pressure [No Carotid Bruit] : no carotid bruit [Normal S1, S2] : normal S1, S2 [No Murmur] : no murmur [No Rub] : no rub [No Gallop] : no gallop [Clear Lung Fields] : clear lung fields [Good Air Entry] : good air entry [No Respiratory Distress] : no respiratory distress  [Soft] : abdomen soft [Non Tender] : non-tender [No Masses/organomegaly] : no masses/organomegaly [Normal Bowel Sounds] : normal bowel sounds [Normal Gait] : normal gait [No Edema] : no edema [No Cyanosis] : no cyanosis [No Clubbing] : no clubbing [No Varicosities] : no varicosities [No Rash] : no rash [No Skin Lesions] : no skin lesions [Moves all extremities] : moves all extremities [No Focal Deficits] : no focal deficits [Normal Speech] : normal speech [Alert and Oriented] : alert and oriented [Normal memory] : normal memory [de-identified] : JVP ~ 8 cm H2O [de-identified] : warm peripherally

## 2024-01-22 NOTE — CARDIOLOGY SUMMARY
[de-identified] : \par  4/17/23 - unchanged\par  10/17/22 - NSR, HR 70, PRWP, anterior/inferior infarct \par  10/25/21 - NSR, anterior infarct, inferior infarct (old)\par  4/26/21 - NSR, nl axis, HR 71, low voltage limb leads; inferior and anterior Q waves; PRWP\par  10/05/20 - NSR, anterior Q waves (old), inf Q waves, PRWP\par  06/22/20 NSR V paced HR 62 \par  03/09/20 - NSR, HR 60, anterior Q waves (old), inferior Q waves, PRWP\par  11/18/19 - sinus, HR 60, PRWP, Q waves anteriorly (old)\par  01/02/19: SB at 59 bpm.\par  11/29/18: NSR, NSST [de-identified] : \par  Device Interrogation Aug 22': AP- ~70 BPM, no events , RA paced 82%, RV 18%  [de-identified] : \par  CPET 08/11/20 - 10:53 min; 4.1 METS; peak VO2 14.4 (43% predicted); VO2 at AT 4.6 ml/kg/min (<40% predicted); O2/pulse was low with VO2/work slope of 8.1 (reduced). VE/VCO2 35; OUES 0.9 L/min (reduced) c/w Peres class C cardiac failure with possible element of deconditioning\par   [de-identified] : TTE 5/30/23: LVIDd 6.5 cm, LVEF 22% (global), mod DD, normal RV size and function, trace AI, percutaneous smct-nx-mbnf mitral repair device with severe intravalvular regurgitation, mild TR, est PASP 36 mmHg. IVC not well visualized   6/29/22 TTE: LVEF 20%, LVIDd 6.9 cm, segmental LVSdysfunction, mod diastolic dysfunction, normal RVSF, nml BiAtria, Jessica Clip present,  mod MR (mean gradient 2 mmHg with HR 70bpm), VTI 18!  06/21/21 TTE: LVIDd 6.3 cm, LVEF 30%, mod DD, normal RV size and function, mildly dilated LA, MitraClip well seated with mild-mod MR (mean gradient 3 mmHg with HR of 88 bpm), min TR.   3/23/21 s/p jessica clip: LVIDd 6.6cm , normal RV size and function, LVEF 20%, severe LVE with severe global LV systolic dysfunction, mild-moderate MR - mean gradient 2-3mmHg.   10/20/20 LVEDD 6.9 cm, EF 27% with inferior wall AK, moderate DD, normal RV size and function, mildly dilated LA, mod-severe eccentric MR, mild TR, est RVSP 27 mmHg  12/9/19 LVEDD 6.5 EF 25% mod-severe eccentric MR, inferior wall AK  06/03/19 - LVEDD 5.9 cm, EF 28% (mild improvement from prior), mod-severe MR, inferior wall AK  02/27/19 - LVEDD 6.7 cm, EF 25% (read as 31% but incorrect), severe MR with inferior wall HK/akinesis  11/14/18 TTE LVEF 15% with severe global LV systolic dysfunction  11/16/18 VERN LVEF 16% with severe global LVSD, severe stage III diastolic dysfunction, severe MR, severe LA & LV enlargement  [de-identified] : \par  03/26/20 - viability study - mod-severe defects in basal anterolateral, inferior, inferolateral and inferoapical walls fixed c/w infarct; 24 hour redistribution of mid-anterolateral and distal lateral walls c/w viability; anterior, septal and apical walls with preserved perfusion c/w viable myocardium; EF 19%\par  \par  11/18 - The left ventricle was enlarged. The anterior, anteroseptal, anterolateral, apical, and mid to distal inferoseptal walls demonstrate mostly preserved perfusion on rest imaging suggestive of viable myocardium. There is a moderate to severe defect in the distal inferior and distal inferolateral walls that is mostly fixed on delay imaging suggestive of mild to moderate viability. There is a small, severe defect in the basal inferoseptum that demonstrates improved perfusion on delay imaging suggestive of mild to moderate residual viability. There are severe defects in the basal to mid inferior and basal to mid inferolateral walls that are mostly fixed on delay imaging suggestive of infarct with minimal residual viability. \par   [de-identified] : \par  2/16/21 - mid LM 20%, pLAD 30%, mLAD patent stent; D1 20% stenosis; prox LCx 60% stenosis; OM1 100% stenosis; prox % stenosis; RA 5, PA 30/12/20, PCWP 13 (v wave 18); CO/CI 5.8/2.8\par  \par  11/20/18 - Impella-assisted PCI of pLAD \par  \par  11/15/18 C pLAD 90%, mCx 100%, pRCA 100%

## 2024-01-25 RX ORDER — SPIRONOLACTONE 25 MG/1
25 TABLET ORAL
Qty: 45 | Refills: 1 | Status: ACTIVE | COMMUNITY
Start: 2019-01-29 | End: 1900-01-01

## 2024-01-26 ENCOUNTER — APPOINTMENT (OUTPATIENT)
Dept: ELECTROPHYSIOLOGY | Facility: CLINIC | Age: 64
End: 2024-01-26

## 2024-02-16 NOTE — H&P ADULT - NSHPPOAURINARYCATHETER_GEN_ALL_CORE
[place] Lips Filler Volume In Cc: 0 Include Documentation That Aspiration Was Performed Prior To Injecting Filler:: No Aspiration Statement: Aspiration was performed prior to injecting site with filler. Lot #: [id number] Expiration Date (Month Year): 05- Anesthesia Type: 1% lidocaine with epinephrine Anesthesia Volume In Cc: 0.5 Additional Anesthesia Volume In Cc: 6 Detail Level: Detailed Additional Area 1 Volume In Cc: 1 Topical Anesthesia?: BLT cream (benzocaine 20%, lidocaine 6%, tetracaine 4%) Additional Area 1 Location: [place] Price (Use Numbers Only, No Special Characters Or $): 029 Consent: Written consent obtained. Risks include but not limited to bruising, beading, irregular texture, ulceration, infection, allergic reaction, scar formation, incomplete augmentation, temporary nature, and procedural pain. no Filler: Juvederm Volbella XC Map Statment: See Attach Map for Complete Details Post-Care Instructions: After the procedure, patient instructed to apply ice to reduce swelling.

## 2024-02-25 ENCOUNTER — NON-APPOINTMENT (OUTPATIENT)
Age: 64
End: 2024-02-25

## 2024-02-26 ENCOUNTER — APPOINTMENT (OUTPATIENT)
Dept: ELECTROPHYSIOLOGY | Facility: CLINIC | Age: 64
End: 2024-02-26
Payer: MEDICARE

## 2024-02-26 PROCEDURE — 93295 DEV INTERROG REMOTE 1/2/MLT: CPT

## 2024-02-26 PROCEDURE — 93296 REM INTERROG EVL PM/IDS: CPT

## 2024-03-09 ENCOUNTER — EMERGENCY (EMERGENCY)
Facility: HOSPITAL | Age: 64
LOS: 1 days | Discharge: HOME | End: 2024-03-09
Attending: EMERGENCY MEDICINE
Payer: MEDICARE

## 2024-03-09 VITALS
HEIGHT: 71 IN | HEART RATE: 62 BPM | RESPIRATION RATE: 20 BRPM | TEMPERATURE: 99 F | OXYGEN SATURATION: 97 % | SYSTOLIC BLOOD PRESSURE: 135 MMHG | DIASTOLIC BLOOD PRESSURE: 80 MMHG | WEIGHT: 190.04 LBS

## 2024-03-09 DIAGNOSIS — Z95.810 PRESENCE OF AUTOMATIC (IMPLANTABLE) CARDIAC DEFIBRILLATOR: Chronic | ICD-10-CM

## 2024-03-09 LAB
ALBUMIN SERPL ELPH-MCNC: 4.5 G/DL — SIGNIFICANT CHANGE UP (ref 3.3–5)
ALP SERPL-CCNC: 63 U/L — SIGNIFICANT CHANGE UP (ref 40–120)
ALT FLD-CCNC: 20 U/L — SIGNIFICANT CHANGE UP (ref 10–45)
ANION GAP SERPL CALC-SCNC: 10 MMOL/L — SIGNIFICANT CHANGE UP (ref 5–17)
AST SERPL-CCNC: 25 U/L — SIGNIFICANT CHANGE UP (ref 10–40)
BASOPHILS # BLD AUTO: 0.02 K/UL — SIGNIFICANT CHANGE UP (ref 0–0.2)
BASOPHILS NFR BLD AUTO: 0.3 % — SIGNIFICANT CHANGE UP (ref 0–2)
BILIRUB SERPL-MCNC: 0.5 MG/DL — SIGNIFICANT CHANGE UP (ref 0.2–1.2)
BUN SERPL-MCNC: 24 MG/DL — HIGH (ref 7–23)
CALCIUM SERPL-MCNC: 9.5 MG/DL — SIGNIFICANT CHANGE UP (ref 8.4–10.5)
CHLORIDE SERPL-SCNC: 100 MMOL/L — SIGNIFICANT CHANGE UP (ref 96–108)
CO2 SERPL-SCNC: 24 MMOL/L — SIGNIFICANT CHANGE UP (ref 22–31)
CREAT SERPL-MCNC: 1.01 MG/DL — SIGNIFICANT CHANGE UP (ref 0.5–1.3)
EGFR: 82 ML/MIN/1.73M2 — SIGNIFICANT CHANGE UP
EOSINOPHIL # BLD AUTO: 0.08 K/UL — SIGNIFICANT CHANGE UP (ref 0–0.5)
EOSINOPHIL NFR BLD AUTO: 1.2 % — SIGNIFICANT CHANGE UP (ref 0–6)
GLUCOSE SERPL-MCNC: 152 MG/DL — HIGH (ref 70–99)
HCT VFR BLD CALC: 39.6 % — SIGNIFICANT CHANGE UP (ref 39–50)
HGB BLD-MCNC: 13.7 G/DL — SIGNIFICANT CHANGE UP (ref 13–17)
IMM GRANULOCYTES NFR BLD AUTO: 0.1 % — SIGNIFICANT CHANGE UP (ref 0–0.9)
LYMPHOCYTES # BLD AUTO: 1.63 K/UL — SIGNIFICANT CHANGE UP (ref 1–3.3)
LYMPHOCYTES # BLD AUTO: 23.8 % — SIGNIFICANT CHANGE UP (ref 13–44)
MAGNESIUM SERPL-MCNC: 1.9 MG/DL — SIGNIFICANT CHANGE UP (ref 1.6–2.6)
MCHC RBC-ENTMCNC: 30.4 PG — SIGNIFICANT CHANGE UP (ref 27–34)
MCHC RBC-ENTMCNC: 34.6 GM/DL — SIGNIFICANT CHANGE UP (ref 32–36)
MCV RBC AUTO: 87.8 FL — SIGNIFICANT CHANGE UP (ref 80–100)
MONOCYTES # BLD AUTO: 0.73 K/UL — SIGNIFICANT CHANGE UP (ref 0–0.9)
MONOCYTES NFR BLD AUTO: 10.7 % — SIGNIFICANT CHANGE UP (ref 2–14)
NEUTROPHILS # BLD AUTO: 4.37 K/UL — SIGNIFICANT CHANGE UP (ref 1.8–7.4)
NEUTROPHILS NFR BLD AUTO: 63.9 % — SIGNIFICANT CHANGE UP (ref 43–77)
NRBC # BLD: 0 /100 WBCS — SIGNIFICANT CHANGE UP (ref 0–0)
PHOSPHATE SERPL-MCNC: 3.7 MG/DL — SIGNIFICANT CHANGE UP (ref 2.5–4.5)
PLATELET # BLD AUTO: 143 K/UL — LOW (ref 150–400)
POTASSIUM SERPL-MCNC: 4.2 MMOL/L — SIGNIFICANT CHANGE UP (ref 3.5–5.3)
POTASSIUM SERPL-SCNC: 4.2 MMOL/L — SIGNIFICANT CHANGE UP (ref 3.5–5.3)
PROT SERPL-MCNC: 6.8 G/DL — SIGNIFICANT CHANGE UP (ref 6–8.3)
RBC # BLD: 4.51 M/UL — SIGNIFICANT CHANGE UP (ref 4.2–5.8)
RBC # FLD: 13.6 % — SIGNIFICANT CHANGE UP (ref 10.3–14.5)
SODIUM SERPL-SCNC: 134 MMOL/L — LOW (ref 135–145)
WBC # BLD: 6.84 K/UL — SIGNIFICANT CHANGE UP (ref 3.8–10.5)
WBC # FLD AUTO: 6.84 K/UL — SIGNIFICANT CHANGE UP (ref 3.8–10.5)

## 2024-03-09 PROCEDURE — 85025 COMPLETE CBC W/AUTO DIFF WBC: CPT

## 2024-03-09 PROCEDURE — 36415 COLL VENOUS BLD VENIPUNCTURE: CPT

## 2024-03-09 PROCEDURE — 83735 ASSAY OF MAGNESIUM: CPT

## 2024-03-09 PROCEDURE — 84100 ASSAY OF PHOSPHORUS: CPT

## 2024-03-09 PROCEDURE — 99285 EMERGENCY DEPT VISIT HI MDM: CPT

## 2024-03-09 PROCEDURE — 80053 COMPREHEN METABOLIC PANEL: CPT

## 2024-03-09 PROCEDURE — 84443 ASSAY THYROID STIM HORMONE: CPT

## 2024-03-09 PROCEDURE — 99284 EMERGENCY DEPT VISIT MOD MDM: CPT | Mod: 25

## 2024-03-09 PROCEDURE — 93289 INTERROG DEVICE EVAL HEART: CPT

## 2024-03-09 PROCEDURE — 93005 ELECTROCARDIOGRAM TRACING: CPT | Mod: XU

## 2024-03-09 NOTE — ED PROVIDER NOTE - PHYSICAL EXAMINATION
GENERAL: well appearing in no acute distress, non-toxic appearing  HEAD: normocephalic, atraumatic  HEENT: normal conjunctiva, oral mucosa moist, uvula midline, no tonsilar exudates, no JVD  CARDIAC: regular rate and rhythm, normal S1S2  PULM: normal breath sounds, clear to ascultation bilaterally, no rales, rhonchi, wheezing  GI: Abd soft, nondistended, nontender, no rebound tenderness, no guarding, no rigidity  : no suprapubic tenderness  MSK: no peripheral edema, no calf tenderness b/l

## 2024-03-09 NOTE — ED PROVIDER NOTE - PATIENT PORTAL LINK FT
You can access the FollowMyHealth Patient Portal offered by [facility] by registering at the following website: [email or web address]. By joining [facility]’s FollowMyHealth portal, you will also be able to view your health information using other applications (apps) compatible with our system. N/A

## 2024-03-09 NOTE — ED ADULT NURSE NOTE - OBJECTIVE STATEMENT
64 y.o male c/o episode of bradycardia after running on treadmill x 60mins. Endorses similar episode last week. States routinely screening BP and HR after exercising and noted machine to say "low" in regards to HR. Pt denies feeling dizzy, diaphoretic, NV CP, SOB. PMH pacemaker, HF

## 2024-03-09 NOTE — ED PROVIDER NOTE - PROGRESS NOTE DETAILS
Travon Fletcher PGY3: spoke with cardiology, EP to see patient first thing in AM for ICD/PM interrogation.  CDU full at this time, patient does not require full admission at this time. Will hold in ED until EP interrogates. Received patient from night team. Waiting for interrogation from EP. EP interrogated. Patient with normal reads. Cleared from cardiology. Patient still asymptomatic. Will discharge. Marjorie GODINEZ

## 2024-03-09 NOTE — ED PROVIDER NOTE - ATTENDING CONTRIBUTION TO CARE
This is a 64-year-old fellow with CHF ischemic cardiomyopathy ejection fraction 25% with ICD in place.  After he was on the treadmill his heart rate was in the 40s.  He did not really have symptoms.  He said that it became into the 60s afterwards.  He did not do anything in the meanwhile.  Regular rate and rhythm  Awake alert talking warm extremities  His EKGBoth supraventricular and ventricular beats.  I suspect that some of the beats are nonconducting which is causing his bradycardia.  Perhaps there is over sensing on the ICD.  [facility] EKG cardiac monitoring will need to discuss with cardiology in order to do interrogation of the pacemaker.

## 2024-03-09 NOTE — ED PROVIDER NOTE - NSICDXPASTSURGICALHX_GEN_ALL_CORE_FT
PAST SURGICAL HISTORY:  History of implantable cardioverter-defibrillator (ICD) insertion [employer] implanted 4/2019

## 2024-03-09 NOTE — ED PROVIDER NOTE - CLINICAL SUMMARY MEDICAL DECISION MAKING FREE TEXT BOX
64M w/ h/o HF, ICM, (EF 25%), ICD, CAD s/p stents, Severe MR s/p clips, DMII, presents after an episode of low heart rate. Per patient, he checks his blood pressure and heart rate after exercising on the treadmill every time. Pt checked his vitals after exercising on treadmill today, HR monitor did not show up, wife manually took his pulse and thought it was in the 40s, and then noticed it went back to his usual rate of around 60s shortly afterwards. Pt asymptomatic throughout this time. Denies CP, SOB, dizziness/lightheadedness, abd pain, N/V/D, dysuria, palpitations, fever/chills. AVSS, EKG showing sinus rhythm in 60s with PVCs, exam WNL. Pt with possible intermittent bradycardia, possible due to ICD/PM malfunction, vs less likely underlying electrolyte/metabolic disturbance, possible related to metoprolol use. Will get labs, ekg, CXR, possible EP for PM interrogation.

## 2024-03-09 NOTE — ED PROVIDER NOTE - NSFOLLOWUPINSTRUCTIONS_ED_ALL_ED_FT
Follow up with your cardiologist this week.   Return to the ER immediately for symptoms(chest pain, shortness of breath, palpitations)

## 2024-03-09 NOTE — ED ADULT NURSE NOTE - NSFALLUNIVINTERV_ED_ALL_ED
Bed/Stretcher in lowest position, wheels locked, appropriate side rails in place/Call bell, personal items and telephone in reach/Instruct patient to call for assistance before getting out of bed/chair/stretcher/Non-slip footwear applied when patient is off stretcher/[place] to call system/Physically safe environment - no spills, clutter or unnecessary equipment/Purposeful proactive rounding/Room/bathroom lighting operational, light cord in reach

## 2024-03-10 ENCOUNTER — RX RENEWAL (OUTPATIENT)
Age: 64
End: 2024-03-10

## 2024-03-10 VITALS
OXYGEN SATURATION: 100 % | RESPIRATION RATE: 16 BRPM | TEMPERATURE: 98 F | HEART RATE: 90 BPM | SYSTOLIC BLOOD PRESSURE: 107 MMHG | DIASTOLIC BLOOD PRESSURE: 65 MMHG

## 2024-03-10 LAB — TSH SERPL-MCNC: 1.58 UIU/ML — SIGNIFICANT CHANGE UP (ref 0.27–4.2)

## 2024-03-10 NOTE — PROCEDURE NOTE - ADDITIONAL PROCEDURE DETAILS
Patient mostly A paced 47%, V paced 5%  No episodes of bradycardia or other arrhythmias noted.     Device functioning well.   No reprogramming changes made.

## 2024-03-10 NOTE — ED ADULT NURSE REASSESSMENT NOTE - NS ED NURSE REASSESS COMMENT FT1
0830 Cardiac at bedside to interrogate pacemaker Pt cleared for discharge and calling his wife for a ride home Verbalized understanding of d/c instructions Yovani

## 2024-03-14 ENCOUNTER — APPOINTMENT (OUTPATIENT)
Dept: HEART FAILURE | Facility: CLINIC | Age: 64
End: 2024-03-14
Payer: MEDICARE

## 2024-03-14 VITALS
TEMPERATURE: 98.2 F | HEART RATE: 62 BPM | BODY MASS INDEX: 37.89 KG/M2 | SYSTOLIC BLOOD PRESSURE: 110 MMHG | OXYGEN SATURATION: 99 % | DIASTOLIC BLOOD PRESSURE: 71 MMHG | WEIGHT: 194 LBS

## 2024-03-14 PROCEDURE — 99214 OFFICE O/P EST MOD 30 MIN: CPT

## 2024-03-14 RX ORDER — DAPAGLIFLOZIN 10 MG/1
10 TABLET, FILM COATED ORAL DAILY
Qty: 3 | Refills: 3 | Status: ACTIVE | COMMUNITY
Start: 2023-10-16 | End: 1900-01-01

## 2024-03-14 RX ORDER — ATORVASTATIN CALCIUM 80 MG/1
80 TABLET, FILM COATED ORAL
Qty: 90 | Refills: 3 | Status: ACTIVE | COMMUNITY
Start: 2018-12-05 | End: 1900-01-01

## 2024-03-14 RX ORDER — METOPROLOL SUCCINATE 50 MG/1
50 TABLET, EXTENDED RELEASE ORAL
Qty: 270 | Refills: 1 | Status: ACTIVE | COMMUNITY
Start: 2018-12-05 | End: 1900-01-01

## 2024-03-14 NOTE — ASSESSMENT
[FreeTextEntry1] : Mr. Jefferson is a 63 y/o M with dilated ICM (LVIDd 6.5 cm, LVEF 22%) s/p [place] [employer] dual chamber ICD (4/2019), CAD s/p Impella-assisted PCI to LAD (11/20/18) w/ residual  of RCA/LCx, mod-severe MR s/p Mitral Clip 3/22/21, and T2DM (A1c 6.6% 4/2023), who recently visited the ED for concern of HR in 40s reported by his BP machine, ICD interrogation unrevealing. He is ACC/AHA Stage C HF, NYHA Class I-II and euvolemic.   # Ischemic cardiomyopathy HFrEF - GDMT:  current regimen is Entresto 49-51 mg BID, Toprol-XL 50 mg BID, Farxiga 5 mg QD and Spironolactone 12.5 mg QOD. Increase Toprol XL to 75 mg BID and in few days if feeling well, increase Farxiga to 10 mg QD.  - Diuretics: no loop diuretic requirement. Advised to hydrate when increasing Farxiga.  - Device: Dual chamber ICD, LRL set at 60 bpm - Labs from 3/9 with K 4.2, Cr 1, normal LFTs and last pro-BNP from [place] was stable in 300s. Labs one week after increasing Farxiga   2. Mitral Regurgitation s/p MitraClip 3/22/21 - TTE with possible worsening MR 5/23 - same plan as above   3. CAD s/p PCI-LAD with  of RCA/LCx: - No s/s of active ischemia - Continue ASA, BB and Entresto - Continue high intensity statin therapy  4. DM  - A1c 6.6 - c/w metformin 1000 mg twice/day - on farxiga  Follow-up with Dr. Triana as scheduled next month

## 2024-03-14 NOTE — CARDIOLOGY SUMMARY
[de-identified] : \par  Device Interrogation Aug 22': AP- ~70 BPM, no events , RA paced 82%, RV 18%  [de-identified] : \par  4/17/23 - unchanged\par  10/17/22 - NSR, HR 70, PRWP, anterior/inferior infarct \par  10/25/21 - NSR, anterior infarct, inferior infarct (old)\par  4/26/21 - NSR, nl axis, HR 71, low voltage limb leads; inferior and anterior Q waves; PRWP\par  10/05/20 - NSR, anterior Q waves (old), inf Q waves, PRWP\par  06/22/20 NSR V paced HR 62 \par  03/09/20 - NSR, HR 60, anterior Q waves (old), inferior Q waves, PRWP\par  11/18/19 - sinus, HR 60, PRWP, Q waves anteriorly (old)\par  01/02/19: SB at 59 bpm.\par  11/29/18: NSR, NSST [de-identified] : \par  CPET 08/11/20 - 10:53 min; 4.1 METS; peak VO2 14.4 (43% predicted); VO2 at AT 4.6 ml/kg/min (<40% predicted); O2/pulse was low with VO2/work slope of 8.1 (reduced). VE/VCO2 35; OUES 0.9 L/min (reduced) c/w Peres class C cardiac failure with possible element of deconditioning\par   [de-identified] : TTE 5/30/23: LVIDd 6.5 cm, LVEF 22% (global), mod DD, normal RV size and function, trace AI, percutaneous [id number]-[id number] mitral repair device with severe intravalvular regurgitation, mild TR, est PASP 36 mmHg. IVC not well visualized   6/29/22 TTE: LVEF 20%, LVIDd 6.9 cm, segmental LVSdysfunction, mod diastolic dysfunction, normal RVSF, nml BiAtria, Jessica Clip present,  mod MR (mean gradient 2 mmHg with HR 70bpm), VTI 18!  06/21/21 TTE: LVIDd 6.3 cm, LVEF 30%, mod DD, normal RV size and function, mildly dilated LA, MitraClip well seated with mild-mod MR (mean gradient 3 mmHg with HR of 88 bpm), min TR.   3/23/21 s/p jessica clip: LVIDd 6.6cm , normal RV size and function, LVEF 20%, severe LVE with severe global LV systolic dysfunction, mild-moderate MR - mean gradient 2-3mmHg.   10/20/20 LVEDD 6.9 cm, EF 27% with inferior wall AK, moderate DD, normal RV size and function, mildly dilated LA, mod-severe eccentric MR, mild TR, est RVSP 27 mmHg  12/9/19 LVEDD 6.5 EF 25% mod-severe eccentric MR, inferior wall AK  06/03/19 - LVEDD 5.9 cm, EF 28% (mild improvement from prior), mod-severe MR, inferior wall AK  02/27/19 - LVEDD 6.7 cm, EF 25% (read as 31% but incorrect), severe MR with inferior wall HK/akinesis  11/14/18 TTE LVEF 15% with severe global LV systolic dysfunction  11/16/18 VERN LVEF 16% with severe global LVSD, severe stage III diastolic dysfunction, severe MR, severe LA & LV enlargement  [de-identified] : \par  03/26/20 - viability study - mod-severe defects in basal anterolateral, inferior, inferolateral and inferoapical walls fixed c/w infarct; 24 hour redistribution of mid-anterolateral and distal lateral walls c/w viability; anterior, septal and apical walls with preserved perfusion c/w viable myocardium; EF 19%\par  \par  11/18 - The left ventricle was enlarged. The anterior, anteroseptal, anterolateral, apical, and mid to distal inferoseptal walls demonstrate mostly preserved perfusion on rest imaging suggestive of viable myocardium. There is a moderate to severe defect in the distal inferior and distal inferolateral walls that is mostly fixed on delay imaging suggestive of mild to moderate viability. There is a small, severe defect in the basal inferoseptum that demonstrates improved perfusion on delay imaging suggestive of mild to moderate residual viability. There are severe defects in the basal to mid inferior and basal to mid inferolateral walls that are mostly fixed on delay imaging suggestive of infarct with minimal residual viability. \par   [de-identified] : \par  2/16/21 - mid LM 20%, pLAD 30%, mLAD patent stent; D1 20% stenosis; prox LCx 60% stenosis; OM1 100% stenosis; prox % stenosis; RA 5, PA 30/12/20, PCWP 13 (v wave 18); CO/CI 5.8/2.8\par  \par  11/20/18 - Impella-assisted PCI of pLAD \par  \par  11/15/18 C pLAD 90%, mCx 100%, pRCA 100%

## 2024-03-14 NOTE — PHYSICAL EXAM
[Normal Conjunctiva] : normal conjunctiva [Normal Venous Pressure] : normal venous pressure [Soft] : abdomen soft [Non Tender] : non-tender [Normal Bowel Sounds] : normal bowel sounds [Normal Radial B/L] : normal radial B/L [Normal] : alert and oriented, normal memory [de-identified] : No perioral cyanosis, MMM  [de-identified] : JVP 6-8 cm H2O, no HJR [de-identified] : warm peripherally

## 2024-03-14 NOTE — HISTORY OF PRESENT ILLNESS
[FreeTextEntry1] : Mr. Jefferson is a 63 y/o M with dilated ICM (LVIDd 6.5 cm, LVEF 22%) s/p [place] [employer] dual chamber ICD (4/2019), CAD s/p Impella-assisted PCI to LAD (11/20/18) w/ residual  of RCA/LCx, mod-severe MR s/p Mitral Clip 3/22/21, and T2DM (A1c 6.6% 4/2023), who presents for routine follow-up of his cardiomyopathy.  For full initial details, please see note from 12/5/18.   He recently visited [facility] ED few days ago for concern for low HR in 40s reported by his BP machine. Reassuringly, ICD interrogation unrevealing. Feeling well otherwise, AT is stable. He is exercising on the treadmill for one hour 3-4x a week, with variations in speed, up to 4.4 mph. Keeps a daily BP log, systolic readings generally 90-100s, infrequently in 80s. He denies CP, palpitations, orthopnea, PND, ABD discomfort and LE swelling.

## 2024-03-26 LAB
ANION GAP SERPL CALC-SCNC: 13 MMOL/L
BUN SERPL-MCNC: 19 MG/DL
CALCIUM SERPL-MCNC: 9.6 MG/DL
CHLORIDE SERPL-SCNC: 102 MMOL/L
CO2 SERPL-SCNC: 22 MMOL/L
CREAT SERPL-MCNC: 1 MG/DL
EGFR: 83 ML/MIN/1.73M2
GLUCOSE SERPL-MCNC: 163 MG/DL
NT-PROBNP SERPL-MCNC: 505 PG/ML
POTASSIUM SERPL-SCNC: 4.2 MMOL/L
SODIUM SERPL-SCNC: 138 MMOL/L

## 2024-04-15 ENCOUNTER — APPOINTMENT (OUTPATIENT)
Dept: HEART FAILURE | Facility: CLINIC | Age: 64
End: 2024-04-15
Payer: MEDICARE

## 2024-04-15 VITALS
DIASTOLIC BLOOD PRESSURE: 68 MMHG | HEART RATE: 64 BPM | BODY MASS INDEX: 37.89 KG/M2 | OXYGEN SATURATION: 99 % | SYSTOLIC BLOOD PRESSURE: 105 MMHG | TEMPERATURE: 98.2 F | WEIGHT: 194 LBS

## 2024-04-15 PROCEDURE — 99214 OFFICE O/P EST MOD 30 MIN: CPT

## 2024-04-15 PROCEDURE — G2211 COMPLEX E/M VISIT ADD ON: CPT

## 2024-04-15 PROCEDURE — 93000 ELECTROCARDIOGRAM COMPLETE: CPT

## 2024-04-17 ENCOUNTER — NON-APPOINTMENT (OUTPATIENT)
Age: 64
End: 2024-04-17

## 2024-04-17 NOTE — ASSESSMENT
[FreeTextEntry1] : Mr. Jefferson is a 63 y/o M with dilated ICM (LVIDd 6.5 cm, LVEF 22%) s/p [place] [employer] dual chamber ICD (4/2019), CAD s/p Impella-assisted PCI to LAD (11/20/18) w/ residual  of RCA/LCx, mod-severe MR s/p Mitral Clip 3/22/21 with residual mod MR, and NIDDM (A1c 6.6% 4/2023), who presents for routine follow-up of his cardiomyopathy. He is ACC/AHA Stage C HF, NYHA Class I-II, normotensive, euvolemic and will attempt to escalate medications given residual mod-severe MR despite clip.   # Ischemic cardiomyopathy HFrEF - GDMT: c/w Toprol XL 75mg BID, Farxiga 10mg QD, geni 12.5mg QOD - currently on entresto 49/51mg BID. Will increase ARNI in 2 step approach (1.5 tablets in evening for 1 week and then1.5 tablets twice/day) given tendency to be orthostatic/hypovolemic - Diuretics: no loop diuretic requirement - Device: Dual chamber ICD, LRL set at 60 bpm - Labs from 3/25/24 K 4.2, BUN/Cr 19/1.0, proBNP 505 (from 264) - Will obtain repeat labs in 2 weeks to monitor effect of higher dose of ARNI - Continue to log daily BP/weights - Will have repeat TTE in 6 weeks.   2. Mitral Regurgitation s/p MitraClip 3/22/21 with residual mod-severe MR - TTE with possible worsening MR 5/23 - same plan as above   3. CAD s/p PCI-LAD with  of RCA/LCx: - No s/s of active ischemia - Continue ASA, BB and Entresto - Continue high intensity statin therapy  4. DM  - A1c 6.6 - c/w metformin 1000 mg twice/day - on farxiga  Follow up with NP in 3 months and Dr. Triana in 6 months. Will obtain repeat TTE in the next 6 weeks.

## 2024-04-17 NOTE — CARDIOLOGY SUMMARY
[de-identified] : 4/15/24 - sinus, anterior and inferior infarct  3/9/24 (ER) - NSR, nl axis, PRWP, Q waves V1- V3, PVCs 4/17/23 - unchanged 10/17/22 - NSR, HR 70, PRWP, anterior/inferior infarct  10/25/21 - NSR, anterior infarct, inferior infarct (old) 4/26/21 - NSR, nl axis, HR 71, low voltage limb leads; inferior and anterior Q waves; PRWP 10/05/20 - NSR, anterior Q waves (old), inf Q waves, PRWP 06/22/20 NSR V paced HR 62  03/09/20 - NSR, HR 60, anterior Q waves (old), inferior Q waves, PRWP 11/18/19 - sinus, HR 60, PRWP, Q waves anteriorly (old) 01/02/19: SB at 59 bpm. 11/29/18: NSR, NSST [de-identified] : CPET 08/11/20 - 10:53 min; 4.1 METS; peak VO2 14.4 (43% predicted); VO2 at AT 4.6 ml/kg/min (<40% predicted); O2/pulse was low with VO2/work slope of 8.1 (reduced). VE/VCO2 35; OUES 0.9 L/min (reduced) c/w Peres class C cardiac failure with possible element of deconditioning  [de-identified] : 5/30/23: EF 22% (global), LVEDD 6.5 cm, mod DD, normal RV size and function, trace AI, percutaneous wymq-dp-shev mitral repair device with severe intravalvular regurgitation, mild TR, est PASP 36 mmHg. IVC not well visualized   6/29/22 TTE: LVEF 20%, LVIDd 6.9 cm, segmental LVSdysfunction, mod diastolic dysfunction, normal RVSF, nml BiAtria, Jessica Clip present,  mod MR (mean gradient 2 mmHg with HR 70bpm), VTI 18!  06/21/21 TTE: LVIDd 6.3 cm, LVEF 30%, mod DD, normal RV size and function, mildly dilated LA, MitraClip well seated with mild-mod MR (mean gradient 3 mmHg with HR of 88 bpm), min TR.   3/23/21 s/p jessica clip: LVIDd 6.6cm , normal RV size and function, LVEF 20%, severe LVE with severe global LV systolic dysfunction, mild-moderate MR - mean gradient 2-3mmHg.   10/20/20 LVEDD 6.9 cm, EF 27% with inferior wall AK, moderate DD, normal RV size and function, mildly dilated LA, mod-severe eccentric MR, mild TR, est RVSP 27 mmHg  12/9/19 LVEDD 6.5 EF 25% mod-severe eccentric MR, inferior wall AK  06/03/19 - LVEDD 5.9 cm, EF 28% (mild improvement from prior), mod-severe MR, inferior wall AK  02/27/19 - LVEDD 6.7 cm, EF 25% (read as 31% but incorrect), severe MR with inferior wall HK/akinesis  11/14/18 TTE LVEF 15% with severe global LV systolic dysfunction  11/16/18 VERN LVEF 16% with severe global LVSD, severe stage III diastolic dysfunction, severe MR, severe LA & LV enlargement  [de-identified] : \par  03/26/20 - viability study - mod-severe defects in basal anterolateral, inferior, inferolateral and inferoapical walls fixed c/w infarct; 24 hour redistribution of mid-anterolateral and distal lateral walls c/w viability; anterior, septal and apical walls with preserved perfusion c/w viable myocardium; EF 19%\par  \par  11/18 - The left ventricle was enlarged. The anterior, anteroseptal, anterolateral, apical, and mid to distal inferoseptal walls demonstrate mostly preserved perfusion on rest imaging suggestive of viable myocardium. There is a moderate to severe defect in the distal inferior and distal inferolateral walls that is mostly fixed on delay imaging suggestive of mild to moderate viability. There is a small, severe defect in the basal inferoseptum that demonstrates improved perfusion on delay imaging suggestive of mild to moderate residual viability. There are severe defects in the basal to mid inferior and basal to mid inferolateral walls that are mostly fixed on delay imaging suggestive of infarct with minimal residual viability. \par   [de-identified] : \par  2/16/21 - mid LM 20%, pLAD 30%, mLAD patent stent; D1 20% stenosis; prox LCx 60% stenosis; OM1 100% stenosis; prox % stenosis; RA 5, PA 30/12/20, PCWP 13 (v wave 18); CO/CI 5.8/2.8\par  \par  11/20/18 - Impella-assisted PCI of pLAD \par  \par  11/15/18 C pLAD 90%, mCx 100%, pRCA 100%

## 2024-04-17 NOTE — HISTORY OF PRESENT ILLNESS
[FreeTextEntry1] : Mr. Jefferson is a 63 y/o M with dilated ICM (LVIDd 6.5 cm, LVEF 22%) s/p [place] [employer] dual chamber ICD (4/2019), CAD s/p Impella-assisted PCI to LAD (11/20/18) w/ residual  of RCA/LCx, mod-severe MR s/p Mitral Clip 3/22/21 with residual mod MR, and NIDDM (A1c 6.6% 4/2023), who presents for routine follow-up of his cardiomyopathy.  For full initial details, please see note from 12/5/18.   He went to the ER last 3/9 for concern for low HR in 40s reported by his BP machine. Reassuringly, ICD interrogation unrevealing. ECG noted sinus (HR 60) with PVCs. Labs were within normal limits.  Otherwise, AT is normal and no changes. He is exercising on the treadmill for one hour 3-4x a week, with variations in speed. Keeps a daily BP log, systolic readings generally 90-100s, infrequently in high 80s  He denies CP, palpitations, SOB at rest, ARAIZA, orthopnea, PND, LH/dizziness ABD discomfort and LE swelling.

## 2024-04-17 NOTE — PHYSICAL EXAM
[Normal Conjunctiva] : normal conjunctiva [Normal Venous Pressure] : normal venous pressure [Soft] : abdomen soft [Non Tender] : non-tender [Normal Bowel Sounds] : normal bowel sounds [Normal Radial B/L] : normal radial B/L [Normal] : alert and oriented, normal memory [No Edema] : no edema [de-identified] : No perioral cyanosis, MMM  [de-identified] : JVP 6-8 cm H2O, no HJR [de-identified] : warm peripherally

## 2024-05-03 ENCOUNTER — NON-APPOINTMENT (OUTPATIENT)
Age: 64
End: 2024-05-03

## 2024-05-03 LAB
ANION GAP SERPL CALC-SCNC: 12 MMOL/L
BUN SERPL-MCNC: 16 MG/DL
CALCIUM SERPL-MCNC: 9.6 MG/DL
CHLORIDE SERPL-SCNC: 104 MMOL/L
CO2 SERPL-SCNC: 24 MMOL/L
CREAT SERPL-MCNC: 1 MG/DL
EGFR: 83 ML/MIN/1.73M2
GLUCOSE SERPL-MCNC: 131 MG/DL
NT-PROBNP SERPL-MCNC: 481 PG/ML
POTASSIUM SERPL-SCNC: 4.7 MMOL/L
SODIUM SERPL-SCNC: 140 MMOL/L

## 2024-05-03 RX ORDER — SACUBITRIL AND VALSARTAN 49; 51 MG/1; MG/1
49-51 TABLET, FILM COATED ORAL
Qty: 270 | Refills: 1 | Status: ACTIVE | COMMUNITY
Start: 2019-01-14

## 2024-05-23 ENCOUNTER — NON-APPOINTMENT (OUTPATIENT)
Age: 64
End: 2024-05-23

## 2024-05-23 LAB
ANION GAP SERPL CALC-SCNC: 14 MMOL/L
BUN SERPL-MCNC: 22 MG/DL
CALCIUM SERPL-MCNC: 9.6 MG/DL
CHLORIDE SERPL-SCNC: 103 MMOL/L
CO2 SERPL-SCNC: 21 MMOL/L
CREAT SERPL-MCNC: 1.08 MG/DL
EGFR: 76 ML/MIN/1.73M2
GLUCOSE SERPL-MCNC: 133 MG/DL
POTASSIUM SERPL-SCNC: 4.6 MMOL/L
SODIUM SERPL-SCNC: 138 MMOL/L

## 2024-05-28 ENCOUNTER — NON-APPOINTMENT (OUTPATIENT)
Age: 64
End: 2024-05-28

## 2024-05-28 ENCOUNTER — APPOINTMENT (OUTPATIENT)
Dept: ELECTROPHYSIOLOGY | Facility: CLINIC | Age: 64
End: 2024-05-28
Payer: MEDICARE

## 2024-05-28 PROCEDURE — 93296 REM INTERROG EVL PM/IDS: CPT

## 2024-05-28 PROCEDURE — 93295 DEV INTERROG REMOTE 1/2/MLT: CPT

## 2024-07-01 ENCOUNTER — APPOINTMENT (OUTPATIENT)
Dept: HEART FAILURE | Facility: CLINIC | Age: 64
End: 2024-07-01
Payer: MEDICARE

## 2024-07-01 VITALS
DIASTOLIC BLOOD PRESSURE: 63 MMHG | WEIGHT: 193 LBS | TEMPERATURE: 98.2 F | HEART RATE: 78 BPM | SYSTOLIC BLOOD PRESSURE: 99 MMHG | BODY MASS INDEX: 37.69 KG/M2 | OXYGEN SATURATION: 100 %

## 2024-07-01 DIAGNOSIS — I25.10 ATHEROSCLEROTIC HEART DISEASE OF NATIVE CORONARY ARTERY W/OUT ANGINA PECTORIS: ICD-10-CM

## 2024-07-01 DIAGNOSIS — I50.22 CHRONIC SYSTOLIC (CONGESTIVE) HEART FAILURE: ICD-10-CM

## 2024-07-01 DIAGNOSIS — Z95.818 OTHER SPECIFIED POSTPROCEDURAL STATES: ICD-10-CM

## 2024-07-01 DIAGNOSIS — E11.9 TYPE 2 DIABETES MELLITUS W/OUT COMPLICATIONS: ICD-10-CM

## 2024-07-01 DIAGNOSIS — Z98.890 OTHER SPECIFIED POSTPROCEDURAL STATES: ICD-10-CM

## 2024-07-01 PROCEDURE — 99214 OFFICE O/P EST MOD 30 MIN: CPT

## 2024-07-02 LAB
ANION GAP SERPL CALC-SCNC: 15 MMOL/L
BUN SERPL-MCNC: 18 MG/DL
CALCIUM SERPL-MCNC: 9.5 MG/DL
CHLORIDE SERPL-SCNC: 102 MMOL/L
CO2 SERPL-SCNC: 22 MMOL/L
CREAT SERPL-MCNC: 0.99 MG/DL
EGFR: 84 ML/MIN/1.73M2
GLUCOSE SERPL-MCNC: 102 MG/DL
NT-PROBNP SERPL-MCNC: 639 PG/ML
POTASSIUM SERPL-SCNC: 4.5 MMOL/L
SODIUM SERPL-SCNC: 140 MMOL/L

## 2024-07-30 ENCOUNTER — APPOINTMENT (OUTPATIENT)
Dept: CV DIAGNOSITCS | Facility: HOSPITAL | Age: 64
End: 2024-07-30

## 2024-07-30 ENCOUNTER — RESULT REVIEW (OUTPATIENT)
Age: 64
End: 2024-07-30

## 2024-07-30 ENCOUNTER — OUTPATIENT (OUTPATIENT)
Dept: OUTPATIENT SERVICES | Facility: HOSPITAL | Age: 64
LOS: 1 days | End: 2024-07-30
Payer: MEDICARE

## 2024-07-30 DIAGNOSIS — I50.22 CHRONIC SYSTOLIC (CONGESTIVE) HEART FAILURE: ICD-10-CM

## 2024-07-30 DIAGNOSIS — Z95.810 PRESENCE OF AUTOMATIC (IMPLANTABLE) CARDIAC DEFIBRILLATOR: Chronic | ICD-10-CM

## 2024-07-30 PROCEDURE — 93356 MYOCRD STRAIN IMG SPCKL TRCK: CPT

## 2024-07-30 PROCEDURE — 93306 TTE W/DOPPLER COMPLETE: CPT | Mod: 26

## 2024-07-30 PROCEDURE — 93306 TTE W/DOPPLER COMPLETE: CPT

## 2024-08-09 NOTE — DISCHARGE NOTE ADULT - PATIENT PORTAL LINK FT
1.87 You can access the [facility] Patient Portal, offered by [facility], by registering with the following website: [email or web address]

## 2024-08-13 NOTE — CHART NOTE - NSCHARTNOTEFT_GEN_A_CORE
Nutrition Follow Up Note    Hospital course as per chart 59 yo. male with a PMH of T2DM. Admitted for SOB and chest pain, found to have 3 vessel CAD and new HFrEF (EF 15-20%), moderate-severe MR, normotensive, euvolemic, CHF NYHA Class III now S/P impella-assisted PCI (). Provided patient further nutrition education on heart healthy, consistent carbohydrate diet. Provided patient heart healthy/consistent carbohydrate nutrition therapy handout.     Source: Patient, medical record, previous RD note    Diet : DASH/TLC, Consistent Carbohydrate (No snack)     Patient reports: Good appetite and PO intake, finishing 100% of meals as per flow sheet. No GI distress reported, last bowel movement () as per flow sheet. No chewing or swallowing difficulty reported. Patient able to identify some main points from previous nutrition education.     Daily Weight in k.4 (-21), Weight in k.3 (-20), Weight in k.3 (-19), Weight in k.8 (-19), Weight in k.4 (-18), Weight in k.3 (-18), Weight in k.2 (-17) - Wt. remains stable    Pertinent Medications: MEDICATIONS  (STANDING):  aspirin enteric coated 81 milliGRAM(s) Oral daily  atorvastatin 80 milliGRAM(s) Oral at bedtime  chlorhexidine 4% Liquid 1 Application(s) Topical <User Schedule>  clopidogrel Tablet 75 milliGRAM(s) Oral daily  dextrose 5%. 1000 milliLiter(s) (50 mL/Hr) IV Continuous <Continuous>  dextrose 50% Injectable 12.5 Gram(s) IV Push once  dextrose 50% Injectable 25 Gram(s) IV Push once  dextrose 50% Injectable 25 Gram(s) IV Push once  heparin  Injectable 5000 Unit(s) SubCutaneous every 12 hours  influenza   Vaccine 0.5 milliLiter(s) IntraMuscular once  insulin lispro (HumaLOG) corrective regimen sliding scale   SubCutaneous at bedtime  insulin lispro (HumaLOG) corrective regimen sliding scale   SubCutaneous three times a day before meals  metoprolol succinate ER 25 milliGRAM(s) Oral daily  sodium chloride 0.9%. 500 milliLiter(s) (100 mL/Hr) IV Continuous <Continuous>    MEDICATIONS  (PRN):  dextrose 40% Gel 15 Gram(s) Oral once PRN Blood Glucose LESS THAN 70 milliGRAM(s)/deciliter  glucagon  Injectable 1 milliGRAM(s) IntraMuscular once PRN Glucose LESS THAN 70 milligrams/deciliter    Pertinent Labs:  @ 05:28: Na 136, BUN 21, Cr 0.89, <H>, K+ 4.3, Phos 4.1, Mg 2.1, Alk Phos 53, ALT/SGPT 43, AST/SGOT 27, HbA1c --    Finger Sticks:  POCT Blood Glucose.: 131 mg/dL ( @ 12:39)  POCT Blood Glucose.: 135 mg/dL ( @ 08:14)  POCT Blood Glucose.: 145 mg/dL ( @ 21:06)      Skin per nursing documentation: no pressure ulcers noted as per flow sheet  Edema: no edema noted as per flow sheet    Estimated Needs: [x ] no change since previous assessment     Previous Nutrition Diagnosis: Food and nutrition related knowledge deficit  Nutrition Diagnosis is: ongoing - addressed by providing patient heart healthy/consistent carbohydrate nutrition education    Interventions:   1. Recommend continue Consistent carbohydrate (no snacks), DASH/TLC diet  2. Reviewed nutrition education. Provided handout as per patient request    Monitoring and Evaluation: Continue to monitor Nutritional intake, Tolerance to diet prescription, weights, labs, skin integrity    RD remains available upon request and will follow up per protocol Nutrition Follow Up Note    Hospital course as per chart 59 yo. male with a PMH of T2DM. Admitted for SOB and chest pain, found to have 3 vessel CAD and new HFrEF (EF 15-20%), moderate-severe MR, normotensive, euvolemic, CHF NYHA Class III now S/P impella-assisted PCI with ROSHNI to pLAD (). Provided patient further nutrition education on heart healthy, consistent carbohydrate diet. Provided patient heart healthy/consistent carbohydrate nutrition therapy handout.     Source: Patient, medical record, previous RD note    Diet : DASH/TLC, Consistent Carbohydrate (No snack)     Patient reports: Good appetite and PO intake, finishing 100% of meals as per flow sheet. No GI distress reported, last bowel movement () as per flow sheet. No chewing or swallowing difficulty reported. Patient able to identify some main points from previous nutrition education.     Daily Weight in k.4 (-21), Weight in k.3 (-20), Weight in k.3 (-19), Weight in k.8 (-19), Weight in k.4 (-18), Weight in k.3 (11-18), Weight in k.2 (-17) - Wt. remains stable    Pertinent Medications: MEDICATIONS  (STANDING):  aspirin enteric coated 81 milliGRAM(s) Oral daily  atorvastatin 80 milliGRAM(s) Oral at bedtime  chlorhexidine 4% Liquid 1 Application(s) Topical <User Schedule>  clopidogrel Tablet 75 milliGRAM(s) Oral daily  dextrose 5%. 1000 milliLiter(s) (50 mL/Hr) IV Continuous <Continuous>  dextrose 50% Injectable 12.5 Gram(s) IV Push once  dextrose 50% Injectable 25 Gram(s) IV Push once  dextrose 50% Injectable 25 Gram(s) IV Push once  heparin  Injectable 5000 Unit(s) SubCutaneous every 12 hours  influenza   Vaccine 0.5 milliLiter(s) IntraMuscular once  insulin lispro (HumaLOG) corrective regimen sliding scale   SubCutaneous at bedtime  insulin lispro (HumaLOG) corrective regimen sliding scale   SubCutaneous three times a day before meals  metoprolol succinate ER 25 milliGRAM(s) Oral daily  sodium chloride 0.9%. 500 milliLiter(s) (100 mL/Hr) IV Continuous <Continuous>    MEDICATIONS  (PRN):  dextrose 40% Gel 15 Gram(s) Oral once PRN Blood Glucose LESS THAN 70 milliGRAM(s)/deciliter  glucagon  Injectable 1 milliGRAM(s) IntraMuscular once PRN Glucose LESS THAN 70 milligrams/deciliter    Pertinent Labs:  @ 05:28: Na 136, BUN 21, Cr 0.89, <H>, K+ 4.3, Phos 4.1, Mg 2.1, Alk Phos 53, ALT/SGPT 43, AST/SGOT 27, HbA1c --    Finger Sticks:  POCT Blood Glucose.: 131 mg/dL ( @ 12:39)  POCT Blood Glucose.: 135 mg/dL ( @ 08:14)  POCT Blood Glucose.: 145 mg/dL ( @ 21:06)      Skin per nursing documentation: no pressure ulcers noted as per flow sheet  Edema: no edema noted as per flow sheet    Estimated Needs: [x ] no change since previous assessment     Previous Nutrition Diagnosis: Food and nutrition related knowledge deficit  Nutrition Diagnosis is: ongoing - addressed by providing patient heart healthy/consistent carbohydrate nutrition education    Interventions:   1. Recommend continue Consistent carbohydrate (no snacks), DASH/TLC diet  2. Reviewed nutrition education. Provided handout as per patient request    Monitoring and Evaluation: Continue to monitor Nutritional intake, Tolerance to diet prescription, weights, labs, skin integrity    RD remains available upon request and will follow up per protocol 84

## 2024-08-27 ENCOUNTER — NON-APPOINTMENT (OUTPATIENT)
Age: 64
End: 2024-08-27

## 2024-08-27 ENCOUNTER — APPOINTMENT (OUTPATIENT)
Dept: ELECTROPHYSIOLOGY | Facility: CLINIC | Age: 64
End: 2024-08-27
Payer: MEDICARE

## 2024-08-27 PROCEDURE — 93295 DEV INTERROG REMOTE 1/2/MLT: CPT

## 2024-08-27 PROCEDURE — 93296 REM INTERROG EVL PM/IDS: CPT

## 2024-08-30 NOTE — ASU PREOP CHECKLIST - BSA (M2)
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

## 2024-09-12 RX ORDER — AMOXICILLIN 500 MG/1
500 TABLET, FILM COATED ORAL
Qty: 4 | Refills: 0 | Status: ACTIVE | COMMUNITY
Start: 2024-09-12 | End: 1900-01-01

## 2024-10-28 ENCOUNTER — APPOINTMENT (OUTPATIENT)
Dept: ELECTROPHYSIOLOGY | Facility: CLINIC | Age: 64
End: 2024-10-28
Payer: MEDICARE

## 2024-10-28 ENCOUNTER — RESULT CHARGE (OUTPATIENT)
Age: 64
End: 2024-10-28

## 2024-10-28 ENCOUNTER — NON-APPOINTMENT (OUTPATIENT)
Age: 64
End: 2024-10-28

## 2024-10-28 VITALS
SYSTOLIC BLOOD PRESSURE: 95 MMHG | OXYGEN SATURATION: 98 % | WEIGHT: 190 LBS | BODY MASS INDEX: 37.11 KG/M2 | HEART RATE: 60 BPM | DIASTOLIC BLOOD PRESSURE: 60 MMHG

## 2024-10-28 PROCEDURE — 93000 ELECTROCARDIOGRAM COMPLETE: CPT | Mod: 59

## 2024-10-28 PROCEDURE — 93283 PRGRMG EVAL IMPLANTABLE DFB: CPT

## 2024-11-04 ENCOUNTER — APPOINTMENT (OUTPATIENT)
Dept: HEART FAILURE | Facility: CLINIC | Age: 64
End: 2024-11-04

## 2024-11-25 ENCOUNTER — APPOINTMENT (OUTPATIENT)
Dept: HEART FAILURE | Facility: CLINIC | Age: 64
End: 2024-11-25
Payer: MEDICARE

## 2024-11-25 ENCOUNTER — NON-APPOINTMENT (OUTPATIENT)
Age: 64
End: 2024-11-25

## 2024-11-25 VITALS
DIASTOLIC BLOOD PRESSURE: 73 MMHG | HEART RATE: 61 BPM | SYSTOLIC BLOOD PRESSURE: 117 MMHG | WEIGHT: 197 LBS | OXYGEN SATURATION: 98 % | BODY MASS INDEX: 27.58 KG/M2 | HEIGHT: 71 IN

## 2024-11-25 DIAGNOSIS — I42.9 CARDIOMYOPATHY, UNSPECIFIED: ICD-10-CM

## 2024-11-25 PROCEDURE — 99214 OFFICE O/P EST MOD 30 MIN: CPT

## 2024-11-25 PROCEDURE — 93000 ELECTROCARDIOGRAM COMPLETE: CPT

## 2024-11-25 PROCEDURE — G2211 COMPLEX E/M VISIT ADD ON: CPT

## 2024-11-26 LAB
ALBUMIN SERPL ELPH-MCNC: 4.7 G/DL
ALP BLD-CCNC: 68 U/L
ALT SERPL-CCNC: 19 U/L
ANION GAP SERPL CALC-SCNC: 17 MMOL/L
AST SERPL-CCNC: 26 U/L
BASOPHILS # BLD AUTO: 0.02 K/UL
BASOPHILS NFR BLD AUTO: 0.4 %
BILIRUB SERPL-MCNC: 0.6 MG/DL
BUN SERPL-MCNC: 20 MG/DL
CALCIUM SERPL-MCNC: 9.5 MG/DL
CHLORIDE SERPL-SCNC: 101 MMOL/L
CHOLEST SERPL-MCNC: 119 MG/DL
CO2 SERPL-SCNC: 20 MMOL/L
CREAT SERPL-MCNC: 0.93 MG/DL
EGFR: 91 ML/MIN/1.73M2
EOSINOPHIL # BLD AUTO: 0.14 K/UL
EOSINOPHIL NFR BLD AUTO: 2.6 %
ESTIMATED AVERAGE GLUCOSE: 151 MG/DL
GLUCOSE SERPL-MCNC: 89 MG/DL
HBA1C MFR BLD HPLC: 6.9 %
HCT VFR BLD CALC: 42.1 %
HDLC SERPL-MCNC: 60 MG/DL
HGB BLD-MCNC: 14.2 G/DL
IMM GRANULOCYTES NFR BLD AUTO: 0.2 %
LDLC SERPL CALC-MCNC: 47 MG/DL
LYMPHOCYTES # BLD AUTO: 1.18 K/UL
LYMPHOCYTES NFR BLD AUTO: 22.1 %
MAN DIFF?: NORMAL
MCHC RBC-ENTMCNC: 30.6 PG
MCHC RBC-ENTMCNC: 33.7 G/DL
MCV RBC AUTO: 90.7 FL
MONOCYTES # BLD AUTO: 0.59 K/UL
MONOCYTES NFR BLD AUTO: 11 %
NEUTROPHILS # BLD AUTO: 3.4 K/UL
NEUTROPHILS NFR BLD AUTO: 63.7 %
NONHDLC SERPL-MCNC: 59 MG/DL
NT-PROBNP SERPL-MCNC: 372 PG/ML
PLATELET # BLD AUTO: 162 K/UL
POTASSIUM SERPL-SCNC: 4.8 MMOL/L
PROT SERPL-MCNC: 6.9 G/DL
RBC # BLD: 4.64 M/UL
RBC # FLD: 13.2 %
SODIUM SERPL-SCNC: 138 MMOL/L
TRIGL SERPL-MCNC: 51 MG/DL
TSH SERPL-ACNC: 1.57 UIU/ML
WBC # FLD AUTO: 5.34 K/UL

## 2025-01-27 ENCOUNTER — NON-APPOINTMENT (OUTPATIENT)
Age: 65
End: 2025-01-27

## 2025-01-27 ENCOUNTER — APPOINTMENT (OUTPATIENT)
Dept: ELECTROPHYSIOLOGY | Facility: CLINIC | Age: 65
End: 2025-01-27
Payer: MEDICARE

## 2025-01-27 PROCEDURE — 93295 DEV INTERROG REMOTE 1/2/MLT: CPT

## 2025-01-27 PROCEDURE — 93296 REM INTERROG EVL PM/IDS: CPT

## 2025-01-30 ENCOUNTER — RX RENEWAL (OUTPATIENT)
Age: 65
End: 2025-01-30
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
## 2025-05-05 ENCOUNTER — APPOINTMENT (OUTPATIENT)
Dept: ELECTROPHYSIOLOGY | Facility: CLINIC | Age: 65
End: 2025-05-05

## 2025-05-05 ENCOUNTER — NON-APPOINTMENT (OUTPATIENT)
Age: 65
End: 2025-05-05

## 2025-05-05 PROCEDURE — 93296 REM INTERROG EVL PM/IDS: CPT

## 2025-05-05 PROCEDURE — 93295 DEV INTERROG REMOTE 1/2/MLT: CPT

## 2025-05-30 ENCOUNTER — APPOINTMENT (OUTPATIENT)
Dept: HEART FAILURE | Facility: CLINIC | Age: 65
End: 2025-05-30
Payer: MEDICARE

## 2025-05-30 ENCOUNTER — NON-APPOINTMENT (OUTPATIENT)
Age: 65
End: 2025-05-30

## 2025-05-30 VITALS
BODY MASS INDEX: 25.92 KG/M2 | RESPIRATION RATE: 16 BRPM | WEIGHT: 185.13 LBS | SYSTOLIC BLOOD PRESSURE: 80 MMHG | TEMPERATURE: 98.1 F | OXYGEN SATURATION: 100 % | HEIGHT: 71 IN | HEART RATE: 58 BPM | DIASTOLIC BLOOD PRESSURE: 50 MMHG

## 2025-05-30 VITALS — SYSTOLIC BLOOD PRESSURE: 97 MMHG | DIASTOLIC BLOOD PRESSURE: 62 MMHG

## 2025-05-30 DIAGNOSIS — E11.9 TYPE 2 DIABETES MELLITUS W/OUT COMPLICATIONS: ICD-10-CM

## 2025-05-30 DIAGNOSIS — Z00.00 ENCOUNTER FOR GENERAL ADULT MEDICAL EXAMINATION W/OUT ABNORMAL FINDINGS: ICD-10-CM

## 2025-05-30 DIAGNOSIS — I42.9 CARDIOMYOPATHY, UNSPECIFIED: ICD-10-CM

## 2025-05-30 PROCEDURE — G2211 COMPLEX E/M VISIT ADD ON: CPT

## 2025-05-30 PROCEDURE — 93000 ELECTROCARDIOGRAM COMPLETE: CPT

## 2025-05-30 PROCEDURE — 36415 COLL VENOUS BLD VENIPUNCTURE: CPT

## 2025-05-30 PROCEDURE — 99214 OFFICE O/P EST MOD 30 MIN: CPT

## 2025-06-02 LAB
ALBUMIN SERPL ELPH-MCNC: 5 G/DL
ALP BLD-CCNC: 63 U/L
ALT SERPL-CCNC: 26 U/L
ANION GAP SERPL CALC-SCNC: 18 MMOL/L
AST SERPL-CCNC: 32 U/L
BASOPHILS # BLD AUTO: 0.03 K/UL
BASOPHILS NFR BLD AUTO: 0.5 %
BILIRUB SERPL-MCNC: 1.1 MG/DL
BUN SERPL-MCNC: 19 MG/DL
CALCIUM SERPL-MCNC: 9.6 MG/DL
CHLORIDE SERPL-SCNC: 99 MMOL/L
CHOLEST SERPL-MCNC: 121 MG/DL
CO2 SERPL-SCNC: 20 MMOL/L
CREAT SERPL-MCNC: 1.05 MG/DL
EGFRCR SERPLBLD CKD-EPI 2021: 78 ML/MIN/1.73M2
EOSINOPHIL # BLD AUTO: 0.06 K/UL
EOSINOPHIL NFR BLD AUTO: 1 %
ESTIMATED AVERAGE GLUCOSE: 140 MG/DL
GLUCOSE SERPL-MCNC: 90 MG/DL
HBA1C MFR BLD HPLC: 6.5 %
HCT VFR BLD CALC: 43.9 %
HDLC SERPL-MCNC: 60 MG/DL
HGB BLD-MCNC: 14.5 G/DL
IMM GRANULOCYTES NFR BLD AUTO: 0.3 %
LDLC SERPL-MCNC: 48 MG/DL
LYMPHOCYTES # BLD AUTO: 1.39 K/UL
LYMPHOCYTES NFR BLD AUTO: 24.3 %
MAN DIFF?: NORMAL
MCHC RBC-ENTMCNC: 30.3 PG
MCHC RBC-ENTMCNC: 33 G/DL
MCV RBC AUTO: 91.8 FL
MONOCYTES # BLD AUTO: 0.68 K/UL
MONOCYTES NFR BLD AUTO: 11.9 %
NEUTROPHILS # BLD AUTO: 3.54 K/UL
NEUTROPHILS NFR BLD AUTO: 62 %
NONHDLC SERPL-MCNC: 61 MG/DL
NT-PROBNP SERPL-MCNC: 399 PG/ML
PLATELET # BLD AUTO: 168 K/UL
POTASSIUM SERPL-SCNC: 4.6 MMOL/L
PROT SERPL-MCNC: 6.9 G/DL
RBC # BLD: 4.78 M/UL
RBC # FLD: 13.2 %
SODIUM SERPL-SCNC: 138 MMOL/L
TRIGL SERPL-MCNC: 59 MG/DL
TSH SERPL-ACNC: 1.88 UIU/ML
WBC # FLD AUTO: 5.72 K/UL

## 2025-07-01 ENCOUNTER — APPOINTMENT (OUTPATIENT)
Dept: CV DIAGNOSITCS | Facility: HOSPITAL | Age: 65
End: 2025-07-01

## 2025-07-01 ENCOUNTER — RESULT REVIEW (OUTPATIENT)
Age: 65
End: 2025-07-01

## 2025-07-01 ENCOUNTER — OUTPATIENT (OUTPATIENT)
Dept: OUTPATIENT SERVICES | Facility: HOSPITAL | Age: 65
LOS: 1 days | End: 2025-07-01
Payer: MEDICARE

## 2025-07-01 DIAGNOSIS — Z95.810 PRESENCE OF AUTOMATIC (IMPLANTABLE) CARDIAC DEFIBRILLATOR: Chronic | ICD-10-CM

## 2025-07-01 DIAGNOSIS — I25.10 ATHEROSCLEROTIC HEART DISEASE OF NATIVE CORONARY ARTERY WITHOUT ANGINA PECTORIS: ICD-10-CM

## 2025-07-01 PROCEDURE — 76376 3D RENDER W/INTRP POSTPROCES: CPT | Mod: 26

## 2025-07-01 PROCEDURE — 93306 TTE W/DOPPLER COMPLETE: CPT

## 2025-07-01 PROCEDURE — 76376 3D RENDER W/INTRP POSTPROCES: CPT

## 2025-07-01 PROCEDURE — 93306 TTE W/DOPPLER COMPLETE: CPT | Mod: 26

## 2025-08-06 ENCOUNTER — APPOINTMENT (OUTPATIENT)
Dept: ELECTROPHYSIOLOGY | Facility: CLINIC | Age: 65
End: 2025-08-06

## 2025-08-06 VITALS — HEART RATE: 59 BPM | SYSTOLIC BLOOD PRESSURE: 97 MMHG | DIASTOLIC BLOOD PRESSURE: 60 MMHG

## 2025-08-06 DIAGNOSIS — I50.22 CHRONIC SYSTOLIC (CONGESTIVE) HEART FAILURE: ICD-10-CM

## 2025-08-06 DIAGNOSIS — Z98.890 OTHER SPECIFIED POSTPROCEDURAL STATES: ICD-10-CM

## 2025-08-06 DIAGNOSIS — I47.20 VENTRICULAR TACHYCARDIA, UNSPECIFIED: ICD-10-CM

## 2025-08-06 DIAGNOSIS — Z95.818 OTHER SPECIFIED POSTPROCEDURAL STATES: ICD-10-CM

## 2025-08-06 PROCEDURE — 99214 OFFICE O/P EST MOD 30 MIN: CPT | Mod: 25

## 2025-08-06 PROCEDURE — 93289 INTERROG DEVICE EVAL HEART: CPT

## 2025-08-06 PROCEDURE — 93000 ELECTROCARDIOGRAM COMPLETE: CPT | Mod: 59

## 2025-08-13 PROBLEM — I47.20 VENTRICULAR TACHYCARDIA (PAROXYSMAL): Status: ACTIVE | Noted: 2025-08-13

## 2025-09-02 ENCOUNTER — RESULT CHARGE (OUTPATIENT)
Age: 65
End: 2025-09-02

## 2025-09-02 ENCOUNTER — APPOINTMENT (OUTPATIENT)
Dept: ELECTROPHYSIOLOGY | Facility: CLINIC | Age: 65
End: 2025-09-02
Payer: MEDICARE

## 2025-09-02 ENCOUNTER — NON-APPOINTMENT (OUTPATIENT)
Age: 65
End: 2025-09-02

## 2025-09-02 VITALS
WEIGHT: 180 LBS | DIASTOLIC BLOOD PRESSURE: 66 MMHG | SYSTOLIC BLOOD PRESSURE: 105 MMHG | HEART RATE: 63 BPM | BODY MASS INDEX: 25.11 KG/M2 | OXYGEN SATURATION: 98 %

## 2025-09-02 PROCEDURE — 93000 ELECTROCARDIOGRAM COMPLETE: CPT | Mod: XU

## 2025-09-02 PROCEDURE — 93283 PRGRMG EVAL IMPLANTABLE DFB: CPT

## 2025-09-02 PROCEDURE — 99213 OFFICE O/P EST LOW 20 MIN: CPT
